# Patient Record
Sex: FEMALE | Employment: FULL TIME | URBAN - METROPOLITAN AREA
[De-identification: names, ages, dates, MRNs, and addresses within clinical notes are randomized per-mention and may not be internally consistent; named-entity substitution may affect disease eponyms.]

---

## 2019-05-28 ENCOUNTER — OFFICE VISIT (OUTPATIENT)
Dept: ORTHOPEDIC SURGERY | Age: 39
End: 2019-05-28

## 2019-05-28 VITALS
SYSTOLIC BLOOD PRESSURE: 137 MMHG | WEIGHT: 273.2 LBS | OXYGEN SATURATION: 98 % | HEART RATE: 77 BPM | BODY MASS INDEX: 41.4 KG/M2 | TEMPERATURE: 98.1 F | DIASTOLIC BLOOD PRESSURE: 86 MMHG | HEIGHT: 68 IN

## 2019-05-28 DIAGNOSIS — M54.16 LUMBAR RADICULOPATHY: ICD-10-CM

## 2019-05-28 DIAGNOSIS — E66.01 OBESITY, MORBID (HCC): ICD-10-CM

## 2019-05-28 DIAGNOSIS — M51.26 HNP (HERNIATED NUCLEUS PULPOSUS), LUMBAR: Primary | ICD-10-CM

## 2019-05-28 RX ORDER — CELECOXIB 100 MG/1
CAPSULE ORAL 2 TIMES DAILY
COMMUNITY
End: 2019-12-17

## 2019-05-28 RX ORDER — DIAZEPAM 5 MG/1
5 TABLET ORAL
COMMUNITY

## 2019-05-28 RX ORDER — PREDNISONE 5 MG/1
TABLET ORAL
Qty: 21 TAB | Refills: 0 | Status: ON HOLD | OUTPATIENT
Start: 2019-05-28 | End: 2019-06-11

## 2019-05-28 RX ORDER — GABAPENTIN 300 MG/1
300 CAPSULE ORAL 3 TIMES DAILY
Qty: 90 CAP | Refills: 1 | Status: SHIPPED | OUTPATIENT
Start: 2019-05-28 | End: 2019-12-17

## 2019-05-28 NOTE — H&P (VIEW-ONLY)
[address] 
[address], [address] Phone: [phone] Fax: [phone] PROGRESS NOTE Patient: Radha Coe                MRN: [id number]       SSN: [id number] YOB: 1980        AGE: 45 y.o. SEX: female Body mass index is 41.54 kg/m². PCP: GILBERTO Silva 
05/28/19 No chief complaint on file. HISTORY OF PRESENT ILLNESS, RADIOGRAPHS, and PLAN:  
 
HISTORY OF PRESENT ILLNESS:  Ms. Junior Zhu is seen today. She is a 60-year-old female who about four years ago I performed an L4-5 laminotomy discectomy on the left. She has done relatively well. She said about four weeks ago, she began to have this relatively severe cramping pain in her leg on the left. Initially, this was just from the calf on down, and then it was a more ____________sciatica for about the past two weeks. She initially was very numb and weak with what she describes as a foot drop on the left. That has since improved. She denies bowel or bladder dysfunction, fever, chills, or night sweats, weight loss or weight gain. PHYSICAL EXAMINATION:  Physical exam demonstrates a positive straight leg raise on the left and weakness of her left EHL and tib/ant that I would rate as a 3+/5. There is numbness to the lateral aspect of the leg. RADIOGRAPHS:  The patients MRI demonstrates a recurrent disc herniation at L4-5 on the left, post-laminectomy changes, and advanced degenerative change at L2-3 and L3-4. ASSESSMENT/PLAN: We have a patient with a recurrent disc herniation at L4-5. She has had initially dramatic pain and weakness that is improved, by her report, since the initial injury two to four weeks ago. At this point, we are going to try some aggressive conservative care with selective blocks and an oral steroid dose pack, use of Gabapentin, and physical therapy.   We will see her back and see if we can continue her mode to improvement. Given the size of the recurrence, it would not surprise me if she required revision surgery, which would either be a revision decompression or decompression and fusion of this L4-5 segment. I would try to avoid fusion surgery in as much as her adjacent segments are so diseased. We discussed the risks, benefits, complications, and alternatives to various treatment options. At this point, she wishes for aggressive conservative care. We will begin that process. 4V Lumbar XR Next Visit Past Medical History:  
Diagnosis Date  Cancer ([address] skin at age 8, Thyroid 2005, Endometrial 2012  Chronic back pain  Sciatica of left side  Thyroid disease   
 hypothyroid Family History Problem Relation Age of Onset  No Known Problems Mother  No Known Problems Father Current Outpatient Medications Medication Sig Dispense Refill  celecoxib (CELEBREX) 100 mg capsule Take  by mouth two (2) times a day.  diazePAM (VALIUM) 5 mg tablet Take 5 mg by mouth every six (6) hours as needed for Anxiety.  lidocaine (LIDODERM) 5 %  multivitamin with iron tablet Take 1 Tab by mouth daily.  IBUPROFEN (ADVIL PO) Take  by mouth daily as needed.  HYDROcodone-acetaminophen (NORCO) 5-325 mg per tablet Take 1 Tab by mouth four (4) times daily as needed for Pain. Max Daily Amount: 4 Tabs. 40 Tab 0  cyclobenzaprine (FLEXERIL) 10 mg tablet Take 1 Tab by mouth three (3) times daily as needed for Muscle Spasm(s). Indications: MUSCLE SPASM 40 Tab 0  
 levothyroxine (SYNTHROID) 125 mcg tablet Take 175 mcg by mouth Daily (before breakfast).  cyclobenzaprine (FLEXERIL) 5 mg tablet  pregabalin (LYRICA) 75 mg capsule Take 1 Cap by mouth two (2) times a day. Max Daily Amount: 150 mg. 60 Cap 2 Allergies Allergen Reactions  Morphine Nausea and Vomiting  Prednisone Unknown (comments) Leg cramps Steroid injections ok  Sulfa (Sulfonamide Antibiotics) Hives Past Surgical History:  
Procedure Laterality Date  HX DILATION AND CURETTAGE  [id number]  
 x2  HX LUMBAR LAMINECTOMY  9/17/15 Zoya Erazo  HX PARTIAL THYROIDECTOMY  2005 Past Medical History:  
Diagnosis Date  Cancer ([facility] [address] skin at age 8, Thyroid 2005, Endometrial 2012  Chronic back pain  Sciatica of left side  Thyroid disease   
 hypothyroid Social History Socioeconomic History  Marital status: UNKNOWN Spouse name: Not on file  Number of children: Not on file  Years of education: Not on file  Highest education level: Not on file Occupational History  Not on file Social Needs  Financial resource strain: Not on file  Food insecurity:  
  Worry: Not on file Inability: Not on file  Transportation needs:  
  Medical: Not on file Non-medical: Not on file Tobacco Use  Smoking status: Former Smoker Last attempt to quit: 8/1/2015 Years since quitting: 3.8  Smokeless tobacco: Never Used Substance and Sexual Activity  Alcohol use: Yes Comment: socially  Drug use: No  
 Sexual activity: Not on file Lifestyle  Physical activity:  
  Days per week: Not on file Minutes per session: Not on file  Stress: Not on file Relationships  Social connections:  
  Talks on phone: Not on file Gets together: Not on file Attends Rastafari service: Not on file Active member of club or organization: Not on file Attends meetings of clubs or organizations: Not on file Relationship status: Not on file  Intimate partner violence:  
  Fear of current or ex partner: Not on file Emotionally abused: Not on file Physically abused: Not on file Forced sexual activity: Not on file Other Topics Concern  Not on file Social History Narrative  Not on file REVIEW OF SYSTEMS: 
 CONSTITUTIONAL SYMPTOMS:  Negative. EYES:  Negative. EARS, NOSE, THROAT AND MOUTH:  Negative. CARDIOVASCULAR:  Negative. RESPIRATORY:  Negative. GENITOURINARY: Per HPI. GASTROINTESTINAL:  Per HPI. INTEGUMENTARY (SKIN AND/OR BREAST):  Negative. MUSCULOSKELETAL: Per HPI. ENDOCRINE/RHEUMATOLOGIC:  Negative. NEUROLOGICAL:  Per HPI. HEMATOLOGIC/LYMPHATIC:  Negative. ALLERGIC/IMMUNOLOGIC:  Negative. PSYCHIATRIC:  Negative. PHYSICAL EXAMINATION:  
Visit Vitals /86 (BP 1 Location: Left arm, BP Patient Position: Sitting) Pulse 77 Temp 98.1 °F (36.7 °C) (Oral) Ht 5' 8\" (1.727 m) Wt 273 lb 3.2 oz (123.9 kg) SpO2 98% BMI 41.54 kg/m² PAIN SCALE: 4/10 CONSTITUTIONAL: The patient is in no apparent distress and is alert and oriented x 3. HEENT: Normocephalic. Hearing grossly intact. NECK: Supple and symmetric. no tenderness, or masses were felt. RESPIRATORY: No labored breathing. CARDIOVASCULAR: The carotid pulses were normal. Peripheral pulses were 2+. CHEST: Normal AP diameter and normal contour without any kyphoscoliosis. LYMPHATIC: No lymphadenopathy was appreciated in the neck, axillae or groin. SKIN:   Negative for scars, rashes, lesions, or ulcers on the right upper, right lower, left upper, left lower and trunk. NEUROLOGICAL: Alert and oriented x 3. Ambulation without assistive device. FWB. EXTREMITIES: See musculoskeletal. 
MUSCULOSKELETAL: 
? Head and Neck:  Negative for misalignment, asymmetry, crepitation, defects, tenderness masses or effusions. ? Left Upper Extremity: Inspection, percussion and palpation performed. Lymans sign is negative. ? Right Upper Extremity: Inspection, percussion and palpation performed. Lymans sign is negative. ? Spine, Ribs and Pelvis: Inspection, percussion and palpation performed. Negative for misalignment, asymmetry, crepitation, defects, tenderness masses or effusions. ? Left Lower Extremity: Sciatica, numbness, cramps, weakness.  Inspection, percussion and palpation performed. Positive straight leg raise. ? Right Lower Extremity: Inspection, percussion and palpation performed. Negative straight leg raise. SPINE EXAM:  
 
Cervical spine: Neck is midline. Normal muscle tone. No focal atrophy is noted. Lumbar spine: No rash, ecchymosis, or gross obliquity. No focal atrophy is noted. ASSESSMENT 
  ICD-10-CM ICD-9-CM 1. HNP (herniated nucleus pulposus), lumbar M51.26 722.10 REFERRAL TO PHYSICAL THERAPY  
   gabapentin (NEURONTIN) 300 mg capsule SCHEDULE SURGERY  
   predniSONE (STERAPRED) 5 mg dose pack 2. Obesity, morbid ([facility] [address] E66.01 278.01   
3. Lumbar radiculopathy M54.16 724.4 REFERRAL TO PHYSICAL THERAPY  
   gabapentin (NEURONTIN) 300 mg capsule SCHEDULE SURGERY  
   predniSONE (STERAPRED) 5 mg dose pack Written by Ledy Enriquez, as dictated by Jacy Fernandez MD. 
 
I, Dr. Jacy Fernandez MD, confirm that all documentation is accurate.

## 2019-05-28 NOTE — PROGRESS NOTES
[address]  [address]  [address]  Phone: [phone]  Fax: [phone]  PROGRESS NOTE  Patient: Cuauhtemoc Wagner                MRN: [id number]       SSN: [id number]  YOB: 1980        AGE: 45 y.o. SEX: female  Body mass index is 41.54 kg/m². PCP: Analisa Topete PA  05/28/19    No chief complaint on file. HISTORY OF PRESENT ILLNESS, RADIOGRAPHS, and PLAN:     HISTORY OF PRESENT ILLNESS:  Ms. Augusto Contreras is seen today. She is a 59-year-old female who about four years ago I performed an L4-5 laminotomy discectomy on the left. She has done relatively well. She said about four weeks ago, she began to have this relatively severe cramping pain in her leg on the left. Initially, this was just from the calf on down, and then it was a more ____________sciatica for about the past two weeks. She initially was very numb and weak with what she describes as a foot drop on the left. That has since improved. She denies bowel or bladder dysfunction, fever, chills, or night sweats, weight loss or weight gain. PHYSICAL EXAMINATION:  Physical exam demonstrates a positive straight leg raise on the left and weakness of her left EHL and tib/ant that I would rate as a 3+/5. There is numbness to the lateral aspect of the leg. RADIOGRAPHS:  The patients MRI demonstrates a recurrent disc herniation at L4-5 on the left, post-laminectomy changes, and advanced degenerative change at L2-3 and L3-4. ASSESSMENT/PLAN: We have a patient with a recurrent disc herniation at L4-5. She has had initially dramatic pain and weakness that is improved, by her report, since the initial injury two to four weeks ago. At this point, we are going to try some aggressive conservative care with selective blocks and an oral steroid dose pack, use of Gabapentin, and physical therapy.   We will see her back and see if we can continue her mode to improvement. Given the size of the recurrence, it would not surprise me if she required revision surgery, which would either be a revision decompression or decompression and fusion of this L4-5 segment. I would try to avoid fusion surgery in as much as her adjacent segments are so diseased. We discussed the risks, benefits, complications, and alternatives to various treatment options. At this point, she wishes for aggressive conservative care. We will begin that process. 4V Lumbar XR Next Visit    Past Medical History:   Diagnosis Date    Cancer [facility])     skin at age 8, Thyroid 2005, Endometrial 2012    Chronic back pain     Sciatica of left side     Thyroid disease     hypothyroid       Family History   Problem Relation Age of Onset    No Known Problems Mother     No Known Problems Father        Current Outpatient Medications   Medication Sig Dispense Refill    celecoxib (CELEBREX) 100 mg capsule Take  by mouth two (2) times a day.  diazePAM (VALIUM) 5 mg tablet Take 5 mg by mouth every six (6) hours as needed for Anxiety.  lidocaine (LIDODERM) 5 %       multivitamin with iron tablet Take 1 Tab by mouth daily.  IBUPROFEN (ADVIL PO) Take  by mouth daily as needed.  HYDROcodone-acetaminophen (NORCO) 5-325 mg per tablet Take 1 Tab by mouth four (4) times daily as needed for Pain. Max Daily Amount: 4 Tabs. 40 Tab 0    cyclobenzaprine (FLEXERIL) 10 mg tablet Take 1 Tab by mouth three (3) times daily as needed for Muscle Spasm(s). Indications: MUSCLE SPASM 40 Tab 0    levothyroxine (SYNTHROID) 125 mcg tablet Take 175 mcg by mouth Daily (before breakfast).  cyclobenzaprine (FLEXERIL) 5 mg tablet       pregabalin (LYRICA) 75 mg capsule Take 1 Cap by mouth two (2) times a day.  Max Daily Amount: 150 mg. 60 Cap 2       Allergies   Allergen Reactions    Morphine Nausea and Vomiting    Prednisone Unknown (comments)     Leg cramps  Steroid injections ok    Sulfa (Sulfonamide Antibiotics) Hives       Past Surgical History:   Procedure Laterality Date    HX DILATION AND CURETTAGE  3517,0505    x2    HX LUMBAR LAMINECTOMY  9/17/15    Shakira Carrasco HX PARTIAL THYROIDECTOMY  2005       Past Medical History:   Diagnosis Date    Cancer [facility])     skin at age 8, Thyroid 2005, Endometrial 2012    Chronic back pain     Sciatica of left side     Thyroid disease     hypothyroid       Social History     Socioeconomic History    Marital status: UNKNOWN     Spouse name: Not on file    Number of children: Not on file    Years of education: Not on file    Highest education level: Not on file   Occupational History    Not on file   Social Needs    Financial resource strain: Not on file    Food insecurity:     Worry: Not on file     Inability: Not on file    Transportation needs:     Medical: Not on file     Non-medical: Not on file   Tobacco Use    Smoking status: Former Smoker     Last attempt to quit: 8/1/2015     Years since quitting: 3.8    Smokeless tobacco: Never Used   Substance and Sexual Activity    Alcohol use: Yes     Comment: socially    Drug use: No    Sexual activity: Not on file   Lifestyle    Physical activity:     Days per week: Not on file     Minutes per session: Not on file    Stress: Not on file   Relationships    Social connections:     Talks on phone: Not on file     Gets together: Not on file     Attends [employer] service: Not on file     Active member of club or organization: Not on file     Attends meetings of clubs or organizations: Not on file     Relationship status: Not on file    Intimate partner violence:     Fear of current or ex partner: Not on file     Emotionally abused: Not on file     Physically abused: Not on file     Forced sexual activity: Not on file   Other Topics Concern    Not on file   Social History Narrative    Not on file         REVIEW OF SYSTEMS:   CONSTITUTIONAL SYMPTOMS:  Negative. EYES:  Negative.    EARS, NOSE, THROAT AND MOUTH:  Negative. CARDIOVASCULAR:  Negative. RESPIRATORY:  Negative. GENITOURINARY: Per HPI. GASTROINTESTINAL:  Per HPI. INTEGUMENTARY (SKIN AND/OR BREAST):  Negative. MUSCULOSKELETAL: Per HPI.   ENDOCRINE/RHEUMATOLOGIC:  Negative. NEUROLOGICAL:  Per HPI. HEMATOLOGIC/LYMPHATIC:  Negative. ALLERGIC/IMMUNOLOGIC:  Negative. PSYCHIATRIC:  Negative. PHYSICAL EXAMINATION:   Visit Vitals  /86 (BP 1 Location: Left arm, BP Patient Position: Sitting)   Pulse 77   Temp 98.1 °F (36.7 °C) (Oral)   Ht 5' 8\" (1.727 m)   Wt 273 lb 3.2 oz (123.9 kg)   SpO2 98%   BMI 41.54 kg/m²    PAIN SCALE: 4/10    CONSTITUTIONAL: The patient is in no apparent distress and is alert and oriented x 3. HEENT: Normocephalic. Hearing grossly intact. NECK: Supple and symmetric. no tenderness, or masses were felt. RESPIRATORY: No labored breathing. CARDIOVASCULAR: The carotid pulses were normal. Peripheral pulses were 2+. CHEST: Normal AP diameter and normal contour without any kyphoscoliosis. LYMPHATIC: No lymphadenopathy was appreciated in the neck, axillae or groin. SKIN:   Negative for scars, rashes, lesions, or ulcers on the right upper, right lower, left upper, left lower and trunk. NEUROLOGICAL: Alert and oriented x 3. Ambulation without assistive device. FWB. EXTREMITIES: See musculoskeletal.  MUSCULOSKELETAL:   Head and Neck:  Negative for misalignment, asymmetry, crepitation, defects, tenderness masses or effusions.  Left Upper Extremity: Inspection, percussion and palpation performed. Lymans sign is negative.  Right Upper Extremity: Inspection, percussion and palpation performed. Lymans sign is negative.  Spine, Ribs and Pelvis: Inspection, percussion and palpation performed. Negative for misalignment, asymmetry, crepitation, defects, tenderness masses or effusions.  Left Lower Extremity: Sciatica, numbness, cramps, weakness. Inspection, percussion and palpation performed. Positive straight leg raise.  Right Lower Extremity: Inspection, percussion and palpation performed. Negative straight leg raise. SPINE EXAM:     Cervical spine: Neck is midline. Normal muscle tone. No focal atrophy is noted. Lumbar spine: No rash, ecchymosis, or gross obliquity. No focal atrophy is noted. ASSESSMENT    ICD-10-CM ICD-9-CM    1. HNP (herniated nucleus pulposus), lumbar M51.26 722.10 REFERRAL TO PHYSICAL THERAPY      gabapentin (NEURONTIN) 300 mg capsule      SCHEDULE SURGERY      predniSONE (STERAPRED) 5 mg dose pack   2. Obesity, morbid ([facility] [address] E66.01 278.01    3. Lumbar radiculopathy M54.16 724.4 REFERRAL TO PHYSICAL THERAPY      gabapentin (NEURONTIN) 300 mg capsule      SCHEDULE SURGERY      predniSONE (STERAPRED) 5 mg dose pack       Written by Melinda Jacobs, as dictated by Luzma Church MD.    I, Dr. Luzma Church MD, confirm that all documentation is accurate.

## 2019-05-31 ENCOUNTER — TELEPHONE (OUTPATIENT)
Dept: ORTHOPEDIC SURGERY | Age: 39
End: 2019-05-31

## 2019-05-31 NOTE — TELEPHONE ENCOUNTER
He also prescribed gabapentin and PT. Can move forward with those 2 treatment options and not take Steroid.

## 2019-05-31 NOTE — TELEPHONE ENCOUNTER
Called in to state that she did not realize that you had given her a prednisone anthony but she had a bad reaction to it several years ago. She had muscle cramping and muscle tearing. She does not want to take Prednisone again. She wanted to know if you wanted to give her something in its place?

## 2019-06-07 ENCOUNTER — HOSPITAL ENCOUNTER (OUTPATIENT)
Dept: PHYSICAL THERAPY | Age: 39
Discharge: HOME | End: 2019-06-07
Payer: OTHER GOVERNMENT

## 2019-06-07 PROCEDURE — 97530 THERAPEUTIC ACTIVITIES: CPT | Performed by: PHYSICAL THERAPIST

## 2019-06-07 PROCEDURE — 97162 PT EVAL MOD COMPLEX 30 MIN: CPT | Performed by: PHYSICAL THERAPIST

## 2019-06-07 NOTE — PROGRESS NOTES
PHYSICAL THERAPY - DAILY TREATMENT NOTE     Patient Name: Jose Hand       Date: 3/1/2019  : 3/14/1968   YES Patient  Verified  Visit #:      [id number]  Insurance: Payor: Keturah Colunga / Plan: Boni Fairchild / Product Type: HMO /      In time: 8419 Out time: 100   Total Treatment Time: 50     Medicare/[facility] Time Tracking (below)   Total Timed Codes (min):   1:1 Treatment Time:       TREATMENT AREA =  Lumbar spine, L LE    SUBJECTIVE    Pain Level (on 0 to 10 scale):  7  / 10   Medication Changes/New allergies or changes in medical history, any new surgeries or procedures? NO    If yes, update Summary List   Subjective Functional Status/Changes:  []  No changes reported     See IE         OBJECTIVE  15 min Therapeutic Activity:  [x]  See flow sheet   Rationale:      increase ROM and increase strength to improve the patients ability to  improve patient's ability to perform ADL's with decreased pain         Billed With/As:   [] TE   [x] TA   [] Neuro   [] Self Care Patient Education: [] Review HEP    [] Progressed/Changed HEP based on:   [x] positioning   [x] body mechanics   [] transfers   [x] heat/ice application    [x] other: Educated the pt about HNP, posture, initiated prone lying and ext, use of ice to reduce inflammation, strategies to feed dogs without bending. Other Objective/Functional Measures:    eval completed     Post Treatment Pain Level (on 0 to 10) scale:    10     ASSESSMENT    Assessment/Changes in Function:     See PN     []  See Progress Note/Recertification   Patient will continue to benefit from skilled PT services to modify and progress therapeutic interventions, address functional mobility deficits, address ROM deficits, address strength deficits, analyze and address soft tissue restrictions, analyze and cue movement patterns, analyze and modify body mechanics/ergonomics, assess and modify postural abnormalities and address imbalance/dizziness to attain remaining goals. Progress toward goals / Updated goals:    See PN     PLAN    [x]  Upgrade activities as tolerated YES Continue plan of care   []  Discharge due to :    []  Other:      Therapist: Derrell Nelson PT    Date: 6/7/19 Time: 2:00PM

## 2019-06-07 NOTE — PROGRESS NOTES
[address]   PHYSICAL THERAPY AT [facility] [address] [place], [address] - Phone: [phone]  Fax: [phone] / [facility]  Patient Name: Carrie Garcia : 1980   Medical   Diagnosis: Lower back pain [M54.5] Treatment Diagnosis: Lower back pain [M54.5] HNP lumbar (M51.26) Lumbar radic (M54.16)   Onset Date:      Referral Source: Reilly Franks MD [facility]): 2019   Prior Hospitalization: See medical history Provider #: [id number]   Prior Level of Function: Pain free ADLs   Comorbidities: L4 Lumbar laminectomy 2015, obestity   Medications: Verified on Patient Summary List   The Plan of Care and following information is based on the information from the initial evaluation.   ===========================================================================================  Assessment / key information:   Pt is a 45y.o. year old female who presents with co LS pain with L LE radiculopathy and tightness/muscle spasms and numbness of posterior chain starting with insidious onset late April and worsened when she slipped in shower 19 and was resolving until she bent forward 19 at which time she began having debilitating radiating pain to her L great toe and now c/o being unable to feel touch other than pressure from lateral gastroc though her foot. She has visited the ER multiple times since this occurrence started, generally at [facility], except after her slip in shower she called an ambulance that took her to closest ED at [facility] where she reports having an MRI. MRI revealed L4-5 disc extrusion and L foraminal narrowing. Patient was being treated with PT from 12/1/15- 16 and  16 - 16 with IMT/ dry needling, core strengthening and mechanical traction. She also has had series of cortisone injections in the past which were relieving.  She is having one on 6/11/19. Patient saw chiropractor 6/4/19 and felt some relief following. Current deficits include: increased pain to 10/10 at worst, increased pain with LS flexion and is unable to drive due to pain, medication. Poor core strength indicated by  50% decreased bridge with pain, L hip flexion strength 4-/5, quad, ant tib = L4/5, ext owen = 2+/5, glut med= 4/5, HS = 4+/5, glut max = 4-/5. L posterior chain tightness with positive seated slump and 90/90 HS flexibility, postural dysfunction with L trunk lean and L should depression. Functional deficits include: am pain/ stiffness, general household activities, lifting, bending, work duties - sitting 30 hours . Pt is currently unable to drive or work due to limited sitting tolerance. FOTO =10 out of possible 100 indicating significant reduction in QOL. Home exercise program of prone press-ups, use of ice, sitting posture and body mechanics ed initiated on initial evaluation to address these deficits. Pt would benefit from PT to address these deficits for increased functional mobility and QOL.    ===========================================================================================  Eval Complexity: History HIGH Complexity :3+ comorbidities / personal factors will impact the outcome/ POC ;  Examination  MEDIUM Complexity : 3 Standardized tests and measures addressing body structure, function, activity limitation and / or participation in recreation ; Presentation MEDIUM Complexity : Evolving with changing characteristics ;   Decision Making HIGH Complexity : FOTO score of 1- 25 ; Overall Complexity MEDIUM  Problem List: pain affecting function, decrease ROM, decrease strength, edema affecting function, impaired gait/ balance, decrease ADL/ functional abilitiies, decrease activity tolerance and decrease flexibility/ joint mobility   Treatment Plan may include any combination of the following: Therapeutic exercise, Therapeutic activities, Neuromuscular re-education, Physical agent/modality, Manual therapy, Patient education, Self Care training and Functional mobility training   *Dry needling  Patient / Family readiness to learn indicated by: asking questions, trying to perform skills and interest  Persons(s) to be included in education: patient (P)  Barriers to Learning/Limitations: None  Measures taken, if barriers to learning:    Patient Goal (s): Relieve pressure from herniated disc. Patient self reported health status: good  Rehabilitation Potential: fair  · Short Term Goals: To be accomplished in  1  weeks:  1. Pt will be independent and compliant with HEP  · Long Term Goals: To be accomplished in 16  treatments:  1. Patient will increase FOTO score to 54/100 for indications of increased functional mobility. 2.  Patient will demo negative seated slump with 5 deg DF AROM for improved neural gliding to decrease pain with LS flexion activities   3. Patient will demo 100% bridge with pain less than 3/10 for indication of improved core strength for decreased compression with sitting at work   4. Patient will demo increased L ext owen strength to  4-/5 for decreased LS strength with car transfers   Frequency / Duration:   Patient to be seen  2-3  times per week for 16  treatments:  Patient / Caregiver education and instruction: self care, activity modification and exercises    Therapist Signature: Alonso Truong PT Date: 8/0/4783   Certification Period:  Time: 1:18 PM   ===========================================================================================  I certify that the above Physical Therapy Services are being furnished while the patient is under my care. I agree with the treatment plan and certify that this therapy is necessary. Physician Signature:        Date:       Time:     Please sign and return to [facility] or you may fax the signed copy to [phone]. Thank you.

## 2019-06-11 ENCOUNTER — HOSPITAL ENCOUNTER (OUTPATIENT)
Age: 39
Setting detail: OUTPATIENT SURGERY
Discharge: HOME | End: 2019-06-11
Attending: PHYSICAL MEDICINE & REHABILITATION | Admitting: PHYSICAL MEDICINE & REHABILITATION
Payer: OTHER GOVERNMENT

## 2019-06-11 ENCOUNTER — APPOINTMENT (OUTPATIENT)
Dept: GENERAL RADIOLOGY | Age: 39
End: 2019-06-11
Attending: PHYSICAL MEDICINE & REHABILITATION
Payer: OTHER GOVERNMENT

## 2019-06-11 VITALS
SYSTOLIC BLOOD PRESSURE: 137 MMHG | OXYGEN SATURATION: 100 % | WEIGHT: 273 LBS | HEART RATE: 75 BPM | RESPIRATION RATE: 14 BRPM | DIASTOLIC BLOOD PRESSURE: 87 MMHG | TEMPERATURE: 98.5 F | HEIGHT: 68 IN | BODY MASS INDEX: 41.37 KG/M2

## 2019-06-11 PROCEDURE — 74011250636 HC RX REV CODE- 250/636: Performed by: PHYSICAL MEDICINE & REHABILITATION

## 2019-06-11 PROCEDURE — 74011636320 HC RX REV CODE- 636/320: Performed by: PHYSICAL MEDICINE & REHABILITATION

## 2019-06-11 PROCEDURE — 77030003672 HC NDL SPN HALY -A: Performed by: PHYSICAL MEDICINE & REHABILITATION

## 2019-06-11 PROCEDURE — 76010000009 HC PAIN MGT 0 TO 30 MIN PROC: Performed by: PHYSICAL MEDICINE & REHABILITATION

## 2019-06-11 PROCEDURE — 77030039433 HC TY MYLEOGRAM BD -B: Performed by: PHYSICAL MEDICINE & REHABILITATION

## 2019-06-11 PROCEDURE — 77030003669 HC NDL SPN COOK -B: Performed by: PHYSICAL MEDICINE & REHABILITATION

## 2019-06-11 PROCEDURE — 74011250637 HC RX REV CODE- 250/637: Performed by: PHYSICAL MEDICINE & REHABILITATION

## 2019-06-11 RX ORDER — DIAZEPAM 5 MG/1
2.5-1 TABLET ORAL ONCE
Status: COMPLETED | OUTPATIENT
Start: 2019-06-11 | End: 2019-06-11

## 2019-06-11 RX ORDER — DEXAMETHASONE SODIUM PHOSPHATE 100 MG/10ML
INJECTION INTRAMUSCULAR; INTRAVENOUS AS NEEDED
Status: DISCONTINUED | OUTPATIENT
Start: 2019-06-11 | End: 2019-06-11 | Stop reason: HOSPADM

## 2019-06-11 RX ORDER — LIDOCAINE HYDROCHLORIDE 10 MG/ML
INJECTION, SOLUTION EPIDURAL; INFILTRATION; INTRACAUDAL; PERINEURAL AS NEEDED
Status: DISCONTINUED | OUTPATIENT
Start: 2019-06-11 | End: 2019-06-11 | Stop reason: HOSPADM

## 2019-06-11 RX ADMIN — DIAZEPAM 5 MG: 5 TABLET ORAL at 10:00

## 2019-06-11 NOTE — DISCHARGE INSTRUCTIONS
[facility]   (BERTO)  Dr. Niki Davison, Dr. Sharad Garcia, Dr. Marilynn Aguilera not drive a car, operate heavy machinery or dangerous equipment for 24 hours. * Activity as tolerated; rest for the remainder of the day. * Resume pre-block medications including those for your family doctor. * Do not drink alcoholic beverages for 24 hours. Alcohol and the medications you have received may interact and cause an adverse reaction. * You may feel better this evening and worse tomorrow, as the numbing medications wears off and the steroid has yet to begin to work. After 48 hrs the steroid should begin to release bringing you relief. * You may shower this evening and remove any bandages. * Avoid hot tubs and heating pads for 24 hours. You may use cold packs on the procedure site as tolerated for the first 24 hours. * If a headache develops, drink plenty of fluids and rest.  Take over the counter medications for headache if needed. If the headache continues longer than 24 hours, call MD at the [address] [place] [address]. [phone]    * Continue taking pain medications as needed. * You may resume your regular diet if tolerated. Otherwise, start with sips of water and advance slowly. * If Diabetic: check your blood sugar three times a day for the next 3 days. If your sugar is greater than 300 call your family doctor. If your sugar is greater than 400, have someone transport you to the nearest Emergency Room. * If you experience any of the following problems, Please Call the [address] [place] [address] at [phone].         * Shortness of Breath    * Fever of 101 or higher    * Nausea / Vomiting    * Severe Headache    * Weakness or numbness in arms or legs that is not      resolving    * Prolonged increase in pain greater than 4 days      DISCHARGE SUMMARY from Nurse      PATIENT INSTRUCTIONS:    After oral sedation, for 24 hours or while taking prescription Narcotics:  · Limit your activities  · Do not drive and operate hazardous machinery  · Do not make important personal or business decisions  · Do  not drink alcoholic beverages  · If you have not urinated within 8 hours after discharge, please contact your surgeon on call. Report the following to your surgeon:  · Excessive pain, swelling, redness or odor of or around the surgical area  · Temperature over 101  · Nausea and vomiting lasting longer than 4 hours or if unable to take medications  · Any signs of decreased circulation or nerve impairment to extremity: change in color, persistent  numbness, tingling, coldness or increase pain  · Any questions            What to do at Home:  Recommended activity: Activity as tolerated, NO DRIVING FOR 12 Hours post injection          *  Please give a list of your current medications to your Primary Care Provider. *  Please update this list whenever your medications are discontinued, doses are      changed, or new medications (including over-the-counter products) are added. *  Please carry medication information at all times in case of emergency situations. These are general instructions for a healthy lifestyle:    No smoking/ No tobacco products/ Avoid exposure to second hand smoke    Surgeon General's Warning:  Quitting smoking now greatly reduces serious risk to your health. Obesity, smoking, and sedentary lifestyle greatly increases your risk for illness    A healthy diet, regular physical exercise & weight monitoring are important for maintaining a healthy lifestyle    You may be retaining fluid if you have a history of heart failure or if you experience any of the following symptoms:  Weight gain of 3 pounds or more overnight or 5 pounds in a week, increased swelling in our hands or feet or shortness of breath while lying flat in bed.   Please call your doctor as soon as you notice any of these symptoms; do not wait until your next office visit. Recognize signs and symptoms of STROKE:    F-face looks uneven    A-arms unable to move or move unevenly    S-speech slurred or non-existent    T-time-call 911 as soon as signs and symptoms begin-DO NOT go       Back to bed or wait to see if you get better-TIME IS BRAIN.

## 2019-06-11 NOTE — PROCEDURES
PROCEDURE NOTE      Patient Name: Papito Lopez  Date of Procedure: June 11, 2019  Preoperative Diagnosis:  Lumbar radicular pain  Post Operative Diagnosis:   [facility]  [facility] [address] [place] [address]    Procedure :    left L4 Selective Nerve Root Block  left L5 Selective Nerve Root Block    Consent:  Informed consent was obtained prior to the procedure. The patient was given the opportunity to ask questions regarding the procedure and its associated risks. In addition to the potential risks associated with the procedure itself, the patient was informed both verbally and in writing of the potential side effects of the use of glucocorticoid. The patient appeared to comprehend the informed consent and desired to have the procedure performed. Procedure: The patient was placed in the prone position on the fluoroscopy table and the back was prepped and draped in the usual sterile manner. The exact spinal level was  identified using fluoroscopy, and Lidocaine 1 % was injected locally, a #5, 22 gauge spinal needle was passed to the transverse process. The depth was noted and the needle redirected to pass inferior and approximately one cm anterior to the transverse process. YES  1 cc of Isovue M-200 was used to verify positioning in the epidural and paravertebral space and outlined the course of the spinal nerve into the epidural space. The same procedure was repeated at each spinal level indicated above. No vascular uptake was identified. A total of 30 mg of preservative free Dexamethasone and 2 cc of Lidocaine was slowly injected. The patient tolerated the procedure well. The injection area was cleaned and bandaids applied. Not excessive bleeding was noted. Patient dressed and discharged to home with instructions. Discussion: The patient tolerated the procedure well.                                               Randy Crane MD  June 11, 2019

## 2019-06-11 NOTE — INTERVAL H&P NOTE
H&P Update: 
Desmond Villalobos was seen and briefly examined. History and physical has been reviewed.   There have been no significant clinical changes since the completion of the originally dated History and Physical.

## 2019-06-21 ENCOUNTER — HOSPITAL ENCOUNTER (OUTPATIENT)
Dept: PHYSICAL THERAPY | Age: 39
Discharge: HOME | End: 2019-06-21
Payer: OTHER GOVERNMENT

## 2019-06-21 PROCEDURE — 97110 THERAPEUTIC EXERCISES: CPT

## 2019-06-21 PROCEDURE — 97012 MECHANICAL TRACTION THERAPY: CPT

## 2019-06-21 PROCEDURE — 97140 MANUAL THERAPY 1/> REGIONS: CPT

## 2019-06-21 NOTE — PROGRESS NOTES
[facility] PT DAILY TREATMENT NOTE 8-    Patient Name: Nicole Amanda  Date:2019  : 1980  [x]  Patient  Verified  Payor:  / Plan: [facility]  [facility] / Product Type:  /    In time:800  Out time:850  Total Treatment Time (min): 50  Visit #: 2 of -16    Treatment Area: Lower back pain [M54.5]    SUBJECTIVE  Pain Level (0-10 scale): 3-4   Any medication changes, allergies to medications, adverse drug reactions, diagnosis change, or new procedure performed?: [x] No    [] Yes (see summary sheet for update)  Subjective functional status/changes:   [] No changes reported  \"I re injured it multiple times. \"    OBJECTIVE  Modality rationale: increase tissue extensibility to improve the patients ability to promote spinal decompression and dec radicualr pain    Min Type Additional Details    [] Estim: []Att   []Unatt        []TENS instruct                  []IFC  []Premod   []NMES                     []Other:  []w/US   []w/ice   []w/heat  Position:  Location:   15 [x]  Traction: LS in supine - 3 john pintermittent  Lbs:100/50      []  Ultrasound: []Continuous   [] Pulsed                           []1MHz   []3MHz Location:  W/cm2:    []  Iontophoresis with dexamethasone         Location: [] Take home patch   [] In clinic    []  Ice     []  heat  []  Ice massage Position:  Location:    []  Vasopneumatic Device Pressure:       [] lo [] med [] hi   Temperature: [] lo [] med [] hi   [x] Skin assessment post-treatment:  [x]intact []redness- no adverse reaction       []redness - adverse reaction:       20 min Therapeutic Exercise:  [x] See flow sheet :Initiated ther ex per flow sheet, symptom review after transfer and prolonged absence     Rationale: increase ROM and decrease radicualr sx  to improve the patients ability to progress activity tolerance with decreased pain levles     15 min Manual Therapy: Technique:      IMT NC   Post needling ischemic compression   L HS rolling    Rationale: decrease pain, increase ROM, increase tissue extensibility and decrease trigger points to improve patient's ability to decrease pain with activities such as walking, standing and sitting     Dry Needling Procedure Note    Dry Needle Session Number:  1    Procedure: An intramuscular manual therapy (dry needling) and a neuro-muscular re-education treatment was done to deactivate myofascial trigger points, with a 15/30 gauge solid filament needle, under aseptic technique. Indication(s): [] Muscle spasms [x] Myalgia/Myositis  [] Muscle cramps      [x] Muscle imbalances [] TMD (TMJ) [] Myofascial pain & dysfunction        Chart reviewed for the following:  Alexey JONES, PT, have reviewed the medical history, summary list and precautions/contraindications for [employer].      TIME OUT performed immediately prior to start of procedure:  800am (enter time the timeout was completed)  Alexey JONES, PT, have performed the following reviews on [employer] prior to the start of the session:      [x] Patient was identified by name and date of birth    [x] Agreement on all muscles being treated was verified   [x] Purpose of dry needling, side effects, possible complications, risks and benefits were explained to the patient   [x] Procedure site(s) verified  [x] Patient was positioned for comfort and draped for privacy  [x] Informed Consent was signed (initial visit) and verified verbally (subsequent visits)  [x] Patient was instructed on the need to report the use of blood thinners and/or immunosuppressant medications  [x] How to respond to possible adverse effects of treatment  [x] Self treatment of post needling soreness: ice, heat (moist heat, heat wraps) and stretching  [x] Opportunity was given to ask any questions, all questions were answered            Treatment:  The following muscles were treated today:    Right:    Left: HS multiple locations      Patients response to todays treatment: []  LTRs  []  Muscle Relaxation  []  Pain Relief  []  Decreased Tinnitus  []  Decreased HAs []  Post needling soreness []  Increased ROM   []  Other:        x min Patient Education: [x] Review HEP from IE  - cont extension based program        Other Objective/Functional Measures: Therex initiated today - first FU post eval      Pain Level (0-10 scale) post treatment: 3    ASSESSMENT/Changes in Function: Patient not seen in 2 weeks sec to transfer process. Patient reports min LE pain today and more LS stiffness. Cortisone shot last week with moderate pain reduction. Patient tolerated initiation of therex well with only focus on spinal extension to decrease radicular pain. 100% VC for form and technique for all newly introduced therex. Initiated traction for LS decompression     Patient will continue to benefit from skilled PT services to modify and progress therapeutic interventions, address functional mobility deficits, address ROM deficits, address strength deficits, analyze and address soft tissue restrictions, analyze and cue movement patterns, analyze and modify body mechanics/ergonomics and assess and modify postural abnormalities to attain remaining goals. [x]  See Plan of Care  []  See progress note/recertification  []  See Discharge Summary         Progress towards goals / Updated goals:  FIRST FU TREATMENT - CONT PER POT TO EST GOALS 6/21/2019   · Short Term Goals: To be accomplished in  1  weeks:  1.  Pt will be independent and compliant with HEP - Goal progressing - HEP est with no questions. HEP will be modified and progressed as appropriate - cont extension based program  6/21/19  · Long Term Goals: To be accomplished in 16  treatments:  1.  Patient will increase FOTO score to 54/100 for indications of increased functional mobility.    2.  Patient will demo negative seated slump with 5 deg DF AROM for improved neural gliding to decrease pain with LS flexion activities   3.  Patient will demo 100% bridge with pain less than 3/10 for indication of improved core strength for decreased compression with sitting at work   4.  Patient will demo increased L ext owen strength to  4-/5 for decreased LS strength with car transfers     PLAN  [x]  Upgrade activities as tolerated     []  Continue plan of care  [x]  Update interventions per flow sheet       []  Discharge due to:_  [x]  Other:_assess response to first FU treatment       Saadia Gutierrez, PT 6/21/2019

## 2019-06-24 ENCOUNTER — HOSPITAL ENCOUNTER (OUTPATIENT)
Dept: PHYSICAL THERAPY | Age: 39
Discharge: HOME | End: 2019-06-24
Payer: OTHER GOVERNMENT

## 2019-06-24 PROCEDURE — 97012 MECHANICAL TRACTION THERAPY: CPT

## 2019-06-24 PROCEDURE — 97140 MANUAL THERAPY 1/> REGIONS: CPT

## 2019-06-24 NOTE — PROGRESS NOTES
[facility] PT DAILY TREATMENT NOTE     Patient Name: Rambo Turner  Date:2019  : 1980  [x]  Patient  Verified  Payor:  / Plan: [facility] REGION / Product Type:  /    In time:745 Out time:850  Total Treatment Time (min): 65  Visit #: 3 of     Treatment Area: Lower back pain [M54.5]    SUBJECTIVE  Pain Level (0-10 scale): 4  Any medication changes, allergies to medications, adverse drug reactions, diagnosis change, or new procedure performed?: [x] No    [] Yes (see summary sheet for update)  Subjective functional status/changes:   [] No changes reported  \"The bones on the top of my foot are just really sore \"    OBJECTIVE  Modality rationale: increase tissue extensibility to improve the patients ability to promote spinal decompression and dec radicualr pain    Min Type Additional Details    [] Estim: []Att   []Unatt        []TENS instruct                  []IFC  []Premod   []NMES                     []Other:  []w/US   []w/ice   []w/heat  Position:  Location:   15 [x]  Traction: LS in supine - 3 step intermittent  Lbs:100/50      []  Ultrasound: []Continuous   [] Pulsed                           []1MHz   []3MHz Location:  W/cm2:    []  Iontophoresis with dexamethasone         Location: [] Take home patch   [] In clinic   10 [x]  Ice  - post traction    []  heat  []  Ice massage Position: semi recline  Location: LS     []  Vasopneumatic Device Pressure:       [] lo [] med [] hi   Temperature: [] lo [] med [] hi   [x] Skin assessment post-treatment:  [x]intact []redness- no adverse reaction       []redness - adverse reaction:       10 (NC) min Therapeutic Exercise:  [x] See flow sheet : LIZETH for decompression    Rationale: increase ROM and decrease radicualr sx  to improve the patients ability to progress activity tolerance with decreased pain levles     30 min Manual Therapy: Technique:      IMT NC   Post needling ischemic compression   L HS rolling   L mid foot mobs  Taping for HS inhibition - 3 I strips (TAPING EDUCATION)    Rationale:      decrease pain, increase ROM, increase tissue extensibility and decrease trigger points to improve patient's ability to decrease pain with activities such as walking, standing and sitting     Dry Needling Procedure Note    Dry Needle Session Number:  2    Procedure: An intramuscular manual therapy (dry needling) and a neuro-muscular re-education treatment was done to deactivate myofascial trigger points, with a 15/30 gauge solid filament needle, under aseptic technique. Indication(s): [] Muscle spasms [x] Myalgia/Myositis  [] Muscle cramps      [x] Muscle imbalances [] TMD (TMJ) [] Myofascial pain & dysfunction        Chart reviewed for the following:  Danielle JONES PT, have reviewed the medical history, summary list and precautions/contraindications for [employer].      TIME OUT performed immediately prior to start of procedure:  745am (enter time the timeout was completed)  Danielle JONES PT, have performed the following reviews on [employer] prior to the start of the session:      [x] Patient was identified by name and date of birth    [x] Agreement on all muscles being treated was verified   [x] Purpose of dry needling, side effects, possible complications, risks and benefits were explained to the patient   [x] Procedure site(s) verified  [x] Patient was positioned for comfort and draped for privacy  [x] Informed Consent was signed (initial visit) and verified verbally (subsequent visits)  [x] Patient was instructed on the need to report the use of blood thinners and/or immunosuppressant medications  [x] How to respond to possible adverse effects of treatment  [x] Self treatment of post needling soreness: ice, heat (moist heat, heat wraps) and stretching  [x] Opportunity was given to ask any questions, all questions were answered            Treatment:  The following muscles were treated today:    Right:    Left: HS multiple locations      Patients response to todays treatment:   []  LTRs  []  Muscle Relaxation  []  Pain Relief  []  Decreased Tinnitus  []  Decreased HAs [x]  Post needling soreness []  Increased ROM   []  Other:        x min Patient Education: [x] Review HEP from IE  - cont extension based program   , taping      Other Objective/Functional Measures:      TC sec to patient arrived 15 min late    Gait - antalgic sec to L foot pain       Pain Level (0-10 scale) post treatment: 3    ASSESSMENT/Changes in Function: Patient reports first FU/ dry needling session caused muld stiffness \"but its was better than I thought it would be. \"  Unable to initiate any new therex sec to TC / patient late. Initiated taping for L HS inhibition sec to possible reflexive spasm ing. Noted poor midfoot mobility with MT today after patient co L foot pain. Also educated patient on desensitization after nerve damage including towel massage     Patient will continue to benefit from skilled PT services to modify and progress therapeutic interventions, address functional mobility deficits, address ROM deficits, address strength deficits, analyze and address soft tissue restrictions, analyze and cue movement patterns, analyze and modify body mechanics/ergonomics and assess and modify postural abnormalities to attain remaining goals. [x]  See Plan of Care  []  See progress note/recertification  []  See Discharge Summary         Progress towards goals / Updated goals: All goals reviewed and progressing appropriately as of 6/24/2019  · Short Term Goals: To be accomplished in  1  weeks:  1.  Pt will be independent and compliant with HEP - Goal progressing - HEP est with no questions. HEP will be modified and progressed as appropriate - cont extension based program  6/21/19  · Long Term Goals: To be accomplished in 16  treatments:  1.  Patient will increase FOTO score to 54/100 for indications of increased functional mobility.    2.  Patient will demo negative seated slump with 5 deg DF AROM for improved neural gliding to decrease pain with LS flexion activities   3. Patient will demo 100% bridge with pain less than 3/10 for indication of improved core strength for decreased compression with sitting at work   4.  Patient will demo increased L ext owen strength to  4-/5 for decreased LS strength with car transfers     PLAN  [x]  Upgrade activities as tolerated     []  Continue plan of care  [x]  Update interventions per flow sheet       []  Discharge due to:_  [x]  Other:_assess response to taping    Millicent Stuart, PT 6/24/2019

## 2019-06-27 ENCOUNTER — HOSPITAL ENCOUNTER (OUTPATIENT)
Dept: PHYSICAL THERAPY | Age: 39
Discharge: HOME | End: 2019-06-27
Payer: OTHER GOVERNMENT

## 2019-06-27 PROCEDURE — 97012 MECHANICAL TRACTION THERAPY: CPT

## 2019-06-27 PROCEDURE — 97110 THERAPEUTIC EXERCISES: CPT

## 2019-06-27 PROCEDURE — 97140 MANUAL THERAPY 1/> REGIONS: CPT

## 2019-06-27 NOTE — PROGRESS NOTES
PT DAILY TREATMENT NOTE 8    Patient Name: Gladys Brown  Date:2019  : 1980  [x]  Patient  Verified  Payor:  / Plan: [facility] / Product Type:  /    In time:6:02  Out time:7:03  Total Treatment Time (min): 61  Total Timed Codes (min): 46  1:1 Treatment Time (min): 46   Visit #: 4 of     Treatment Area: Lower back pain [M54.5]    SUBJECTIVE  Pain Level (0-10 scale): 2-3  Any medication changes, allergies to medications, adverse drug reactions, diagnosis change, or new procedure performed?: [x] No    [] Yes (see summary sheet for update)  Subjective functional status/changes:   [] No changes reported  The L HS is just gritty     Response to taping: the tape felt good        OBJECTIVE  Modality rationale: decrease inflammation, decrease pain and increase tissue extensibility to improve the patients ability to ambulate, ADls, sit, lift    Min Type Additional Details    [] Estim: []Att   []Unatt        []TENS instruct                  []IFC  []Premod   []NMES                     []Other:  []w/US   []w/ice   []w/heat  Position:  Location:   12 + 3 set-up [x]  Traction: [] Cervical       [x]Lumbar in supine, 3 step intermittent                       Lbs: 100/50  [] before manual  [x] after manual    []  Ultrasound: []Continuous   [] Pulsed                           []1MHz   []3MHz Location:  W/cm2:    []  Iontophoresis with dexamethasone         Location: [] Take home patch   [] In clinic   10 [x]  Ice    - post traction  Position: semireclined  Location: L/S   [x] Skin assessment post-treatment:  [x]intact []redness- no adverse reaction       []redness - adverse reaction:       21 min Therapeutic Exercise:  [x] See flow sheet :   Rationale: increase ROM to improve the patients ability to sit, stand, ambulation      25 min Manual Therapy: Technique:      IMT NC   Post needling ischemic compression   L HS IASTM to medial HS in prone   L mid foot mobs - held today Taping for HS inhibition - 2 I strips (TAPING EDUCATION)    Rationale:      decrease pain, increase ROM, increase tissue extensibility and decrease trigger points to improve patient's ability to ambulate, work, sit,     Dry Needling Procedure Note    Dry Needle Session Number:  2    Procedure: An intramuscular manual therapy (dry needling) and a neuro-muscular re-education treatment was done to deactivate myofascial trigger points, with a 15/30 gauge solid filament needle, under aseptic technique. Indication(s): [] Muscle spasms [x] Myalgia/Myositis  [x] Muscle cramps      [x] Muscle imbalances [] TMD (TMJ) [] Myofascial pain & dysfunction     [] Other: __    Chart reviewed for the following:  Glory JONES PT, have reviewed the medical history, summary list and precautions/contraindications for [employer].     TIME OUT performed immediately prior to start of procedure:  6:20  (enter time the timeout was completed)  Glory JONES PT, have performed the following reviews on [employer] prior to the start of the session:      [x] Patient was identified by name and date of birth    [x] Agreement on all muscles being treated was verified   [x] Purpose of dry needling, side effects, possible complications, risks and benefits were explained to the patient   [x] Procedure site(s) verified  [x] Patient was positioned for comfort and draped for privacy  [x] Informed Consent was signed (initial visit) and verified verbally (subsequent visits)  [x] Patient was instructed on the need to report the use of blood thinners and/or immunosuppressant medications  [x] How to respond to possible adverse effects of treatment  [x] Self treatment of post needling soreness: ice, heat (moist heat, heat wraps) and stretching  [x] Opportunity was given to ask any questions, all questions were answered            Treatment:  The following muscles were treated today:    Right:    Left: Various locations in the medial HS     Patients response to todays treatment:   []  LTRs  [x]  Muscle Relaxation  []  Pain Relief  []  Decreased Tinnitus  []  Decreased HAs [x]  Post needling soreness []  Increased ROM   []  Other:        x min Patient Education: [x] Review HEP    [] Progressed/Changed HEP based on:   [] positioning   [] body mechanics   [] transfers   [] heat/ice application        Other Objective/Functional Measures:     Pain Level (0-10 scale) post treatment: 1-2 in lumbar only     ASSESSMENT/Changes in Function: Gritty nodules in the (L) medial HS, no LTR's elicited but pt does note she is responding well to IMT for pain relief. Discussed chairs/mechanics to improve positioning and pain at work. Pt con't to respond well to taping for L HS inhibition. Patient will continue to benefit from skilled PT services to modify and progress therapeutic interventions, address functional mobility deficits, address ROM deficits, address strength deficits, analyze and address soft tissue restrictions, analyze and cue movement patterns, analyze and modify body mechanics/ergonomics and assess and modify postural abnormalities to attain remaining goals. []  See Plan of Care  []  See progress note/recertification  []  See Discharge Summary         Progress towards goals / Updated goals: · Short Term Goals: To be accomplished in  1  weeks:  1.  Pt will be independent and compliant with HEP - Goal progressing - HEP est with no questions. HEP will be modified and progressed as appropriate - cont extension based program  6/21/19  · Long Term Goals: To be accomplished in 16  treatments:  1.  Patient will increase FOTO score to 54/100 for indications of increased functional mobility.    2.  Patient will demo negative seated slump with 5 deg DF AROM for improved neural gliding to decrease pain with LS flexion activities   3.  Patient will demo 100% bridge with pain less than 3/10 for indication of improved core strength for decreased compression with sitting at work  Noting HS cramping with bridging (6/27/19)  4. Patient will demo increased L ext owen strength to  4-/5 for decreased LS strength with car transfers     PLAN  [x]  Upgrade activities as tolerated     []  Continue plan of care  []  Update interventions per flow sheet       []  Discharge due to:_  [x]  Other:_  Formally assess goals NV.  Approaching PN on 7/7/19  Bladimir Pierre DPT 6/27/2019  1:45 PM

## 2019-07-01 ENCOUNTER — HOSPITAL ENCOUNTER (OUTPATIENT)
Dept: PHYSICAL THERAPY | Age: 39
Discharge: HOME | End: 2019-07-01
Payer: OTHER GOVERNMENT

## 2019-07-01 PROCEDURE — 97012 MECHANICAL TRACTION THERAPY: CPT

## 2019-07-01 PROCEDURE — 97140 MANUAL THERAPY 1/> REGIONS: CPT

## 2019-07-01 PROCEDURE — 97110 THERAPEUTIC EXERCISES: CPT

## 2019-07-01 NOTE — PROGRESS NOTES
PT DAILY TREATMENT NOTE     Patient Name: Steffany Higgins  Date:2019  : 1980  [x]  Patient  Verified  Payor:  / Plan: [facility]  [facility] REGION / Product Type:  /    In time: 0  Out time:925  Total Treatment Time (min): 80  Visit #: 5 of     Treatment Area: Lower back pain [M54.5]    SUBJECTIVE  Pain Level (0-10 scale): 4  Any medication changes, allergies to medications, adverse drug reactions, diagnosis change, or new procedure performed?: [x] No    [] Yes (see summary sheet for update)  Subjective functional status/changes:   [] No changes reported  \"I still have that pain when I sit at work. \"    OBJECTIVE  Modality rationale: decrease inflammation, decrease pain and increase tissue extensibility to improve the patients ability to ambulate, ADls, sit, lift    Min Type Additional Details    [] Estim: []Att   []Unatt        []TENS instruct                  []IFC  []Premod   []NMES                     []Other:  []w/US   []w/ice   []w/heat  Position:  Location:   15 [x]  Traction:       [x]Lumbar in supine, 3 step intermittent                       Lbs: 100/50  [] before manual  [x] after manual    []  Ultrasound: []Continuous   [] Pulsed                           []1MHz   []3MHz Location:  W/cm2:    []  Iontophoresis with dexamethasone         Location: [] Take home patch   [] In clinic   10 [x]  Ice    - post traction  Position: semireclined  Location: L/S   [x] Skin assessment post-treatment:  [x]intact []redness- no adverse reaction       []redness - adverse reaction:       20 min Therapeutic Exercise:  [x] See flow sheet :   Rationale: increase ROM to improve the patients ability to sit, stand, ambulation      35 min Manual Therapy: Technique:      IMT NC   Post needling ischemic compression   L HS IASTM to  HS in prone  Supine HS stretch manual    Taping for HS inhibition - 1 I strips, 1 X pattern over HS insertion    Rationale:      decrease pain, increase ROM, increase tissue extensibility and decrease trigger points to improve patient's ability to ambulate, work, sit,     Dry Needling Procedure Note    Dry Needle Session Number:  4    Procedure: An intramuscular manual therapy (dry needling) and a neuro-muscular re-education treatment was done to deactivate myofascial trigger points, with a 15/30 gauge solid filament needle, under aseptic technique. Indication(s): [] Muscle spasms [x] Myalgia/Myositis  [x] Muscle cramps      [x] Muscle imbalances [] TMD (TMJ) [] Myofascial pain & dysfunction     [] Other: __    Chart reviewed for the following:  Asher JONES PT, have reviewed the medical history, summary list and precautions/contraindications for [employer].     TIME OUT performed immediately prior to start of procedure:  815am  (enter time the timeout was completed)  Asher JONES PT, have performed the following reviews on [employer] prior to the start of the session:      [x] Patient was identified by name and date of birth    [x] Agreement on all muscles being treated was verified   [x] Purpose of dry needling, side effects, possible complications, risks and benefits were explained to the patient   [x] Procedure site(s) verified  [x] Patient was positioned for comfort and draped for privacy  [x] Informed Consent was signed (initial visit) and verified verbally (subsequent visits)  [x] Patient was instructed on the need to report the use of blood thinners and/or immunosuppressant medications  [x] How to respond to possible adverse effects of treatment  [x] Self treatment of post needling soreness: ice, heat (moist heat, heat wraps) and stretching  [x] Opportunity was given to ask any questions, all questions were answered            Treatment:  The following muscles were treated today:    Right:    Left: Various locations in the all HS     Patients response to todays treatment:   []  LTRs  [x]  Muscle Relaxation  []  Pain Relief  [] Decreased Tinnitus  []  Decreased HAs [x]  Post needling soreness []  Increased ROM   []  Other:        x min Patient Education: [x] Review HEP        Other Objective/Functional Measures:    PRE progression - Prone heel press and prone TKE for glut activation     Pain Level (0-10 scale) post treatment: 2     ASSESSMENT/Changes in Function: Patient  Reports with localized pain around the ischial tuberosity with prolonged sitting at work. Tightness noted with manual HS stretch. Also address with \"x\" taping at HS insertion. .  Educated on upcoming reassessment for PN     Patient will continue to benefit from skilled PT services to modify and progress therapeutic interventions, address functional mobility deficits, address ROM deficits, address strength deficits, analyze and address soft tissue restrictions, analyze and cue movement patterns, analyze and modify body mechanics/ergonomics and assess and modify postural abnormalities to attain remaining goals. [x]  See Plan of Care  []  See progress note/recertification  []  See Discharge Summary         Progress towards goals / Updated goals: WILL FORMALLY ASSESS ALL GOALS NV FOR 30 DAY PN 7/1/19    · Short Term Goals: To be accomplished in  1  weeks:  1.  Pt will be independent and compliant with HEP - Goal progressing - HEP est with no questions. HEP will be modified and progressed as appropriate - cont extension based program  6/21/19  · Long Term Goals: To be accomplished in 16  treatments:  1.  Patient will increase FOTO score to 54/100 for indications of increased functional mobility.    2.  Patient will demo negative seated slump with 5 deg DF AROM for improved neural gliding to decrease pain with LS flexion activities   3. Patient will demo 100% bridge with pain less than 3/10 for indication of improved core strength for decreased compression with sitting at work  Noting HS cramping with bridging (6/27/19)  4.  Patient will demo increased L ext owen strength to  4-/5 for decreased LS strength with car transfers     PLAN  [x]  Upgrade activities as tolerated     []  Continue plan of care  []  Update interventions per flow sheet       []  Discharge due to:_  [x]  Other:_  Formally assess goals NV.  Approaching PN on 7/7/19    Tray Westbrook DPT 7/1/2019

## 2019-07-03 ENCOUNTER — HOSPITAL ENCOUNTER (OUTPATIENT)
Dept: PHYSICAL THERAPY | Age: 39
Discharge: HOME | End: 2019-07-03
Payer: OTHER GOVERNMENT

## 2019-07-03 PROCEDURE — 97110 THERAPEUTIC EXERCISES: CPT

## 2019-07-03 PROCEDURE — 97012 MECHANICAL TRACTION THERAPY: CPT

## 2019-07-03 PROCEDURE — 97140 MANUAL THERAPY 1/> REGIONS: CPT

## 2019-07-03 NOTE — PROGRESS NOTES
PT DAILY TREATMENT NOTE     Patient Name: Francois Fields  Date:7/3/2019  : 1980  [x]  Patient  Verified  Payor:  / Plan: [facility]  [facility] REGION / Product Type:  /    In time: 12 Out time:910  Total Treatment Time (min): 70  Visit #: 6 of     Treatment Area: Lower back pain [M54.5]    SUBJECTIVE  Pain Level (0-10 scale): 5  Any medication changes, allergies to medications, adverse drug reactions, diagnosis change, or new procedure performed?: [x] No    [] Yes (see summary sheet for update)  Subjective functional status/changes:   [] No changes reported  \"Both legs seized up last night. \"    OBJECTIVE  Modality rationale: decrease inflammation, decrease pain and increase tissue extensibility to improve the patients ability to ambulate, ADls, sit, lift    Min Type Additional Details    [] Estim: []Att   []Unatt        []TENS instruct                  []IFC  []Premod   []NMES                     []Other:  []w/US   []w/ice   []w/heat  Position:  Location:   15 [x]  Traction:       [x]Lumbar in supine, 3 step intermittent                       Lbs: 100/50  [] before manual  [x] after manual    []  Ultrasound: []Continuous   [] Pulsed                           []1MHz   []3MHz Location:  W/cm2:    []  Iontophoresis with dexamethasone         Location: [] Take home patch   [] In clinic   10 [x]  Ice    - post traction  Position: semireclined  Location: L/S   [x] Skin assessment post-treatment:  [x]intact []redness- no adverse reaction       []redness - adverse reaction:       29 min Therapeutic Exercise:  [x] See flow sheet :   Rationale: increase ROM to improve the patients ability to sit, stand, ambulation      16 min Manual Therapy: Technique:      IMT NC   Post needling ischemic compression   B HS roller   X taping mid HS with 75% tension    Rationale:      decrease pain, increase ROM, increase tissue extensibility and decrease trigger points to improve patient's ability to ambulate, work, sit,     Dry Needling Procedure Note    Dry Needle Session Number:  5    Procedure: An intramuscular manual therapy (dry needling) and a neuro-muscular re-education treatment was done to deactivate myofascial trigger points, with a 15/30 gauge solid filament needle, under aseptic technique. Indication(s): [] Muscle spasms [x] Myalgia/Myositis  [x] Muscle cramps      [x] Muscle imbalances [] TMD (TMJ) [] Myofascial pain & dysfunction     [] Other: __    Chart reviewed for the following:  Kody JONES PT, have reviewed the medical history, summary list and precautions/contraindications for [employer].     TIME OUT performed immediately prior to start of procedure:  815am (enter time the timeout was completed)  Kody JONES PT, have performed the following reviews on [employer] prior to the start of the session:      [x] Patient was identified by name and date of birth    [x] Agreement on all muscles being treated was verified   [x] Purpose of dry needling, side effects, possible complications, risks and benefits were explained to the patient   [x] Procedure site(s) verified  [x] Patient was positioned for comfort and draped for privacy  [x] Informed Consent was signed (initial visit) and verified verbally (subsequent visits)  [x] Patient was instructed on the need to report the use of blood thinners and/or immunosuppressant medications  [x] How to respond to possible adverse effects of treatment  [x] Self treatment of post needling soreness: ice, heat (moist heat, heat wraps) and stretching  [x] Opportunity was given to ask any questions, all questions were answered            Treatment:  The following muscles were treated today:    Right: MId HS    Left: Mid HS, L QL      Patients response to todays treatment:   []  LTRs  [x]  Muscle Relaxation  []  Pain Relief  []  Decreased Tinnitus  []  Decreased HAs [x]  Post needling soreness []  Increased ROM   []  Other: x min Patient Education: [x] Review HEP        Other Objective/Functional Measures:    Slump : +     Hip extension : 3/5     Pain Level (0-10 scale) post treatment: 4     ASSESSMENT/Changes in Function: Patient cont to benefit from current treatment of IMT and traction. Poor glut recruitment with hip extension sec to strength deficits. HEP compliance is questionable. Cont posterior chain tightness. Address B LE today and L QL with IMT. Excellent response to QL IMT. Patient will continue to benefit from skilled PT services to modify and progress therapeutic interventions, address functional mobility deficits, address ROM deficits, address strength deficits, analyze and address soft tissue restrictions, analyze and cue movement patterns, analyze and modify body mechanics/ergonomics and assess and modify postural abnormalities to attain remaining goals. [x]  See Plan of Care  []  See progress note/recertification  []  See Discharge Summary         Progress towards goals / Updated goals: · Short Term Goals: To be accomplished in  1  weeks:  1.  Pt will be independent and compliant with HEP - Goal progressing - HEP est with no questions. HEP will be modified and progressed as appropriate - cont extension based program  6/21/19  · Long Term Goals: To be accomplished in 16  treatments:  1.  Patient will increase FOTO score to 54/100 for indications of increased functional mobility.    2.  Patient will demo negative seated slump with 5 deg DF AROM for improved neural gliding to decrease pain with LS flexion activities  Goal not met - + slump sec to posterior chain tightness  7/3/19  3. Patient will demo 100% bridge with pain less than 3/10 for indication of improved core strength for decreased compression with sitting at work  Noting HS cramping with bridging (6/27/19)  4.  Patient will demo increased L ext owen strength to  4-/5 for decreased LS strength with car transfers     PLAN  [x]  Upgrade activities as tolerated     []  Continue plan of care  []  Update interventions per flow sheet       []  Discharge due to:_  [x]  Other:_  Formally assess goals NV   Progress HEP NV     Sailaja Montemayor DPJAIME 7/3/2019

## 2019-07-08 ENCOUNTER — HOSPITAL ENCOUNTER (OUTPATIENT)
Dept: PHYSICAL THERAPY | Age: 39
Discharge: HOME | End: 2019-07-08
Payer: OTHER GOVERNMENT

## 2019-07-08 PROCEDURE — 97012 MECHANICAL TRACTION THERAPY: CPT

## 2019-07-08 PROCEDURE — 97140 MANUAL THERAPY 1/> REGIONS: CPT

## 2019-07-08 PROCEDURE — 97110 THERAPEUTIC EXERCISES: CPT

## 2019-07-08 NOTE — PROGRESS NOTES
PT DAILY TREATMENT NOTE     Patient Name: Tenisha Borrego  Date:2019  : 1980  [x]  Patient  Verified  Payor:  / Plan: [facility]  [facility] REGION / Product Type:  /    In time: 0 Out time:925  Total Treatment Time (min): 80  Visit #: 7 of     Treatment Area: Lower back pain [M54.5]    SUBJECTIVE  Pain Level (0-10 scale): 5  Any medication changes, allergies to medications, adverse drug reactions, diagnosis change, or new procedure performed?: [x] No    [] Yes (see summary sheet for update)  Subjective functional status/changes:   [] No changes reported  \"I have a second opinion next week. I don't get to see Dr Jesus Nina till the .  \"    OBJECTIVE  Modality rationale: decrease inflammation, decrease pain and increase tissue extensibility to improve the patients ability to ambulate, ADls, sit, lift    Min Type Additional Details    [] Estim: []Att   []Unatt        []TENS instruct                  []IFC  []Premod   []NMES                     []Other:  []w/US   []w/ice   []w/heat  Position:  Location:   15 [x]  Traction:       [x]Lumbar in supine, 3 step intermittent                       Lbs: 100/50  [] before manual  [x] after manual    []  Ultrasound: []Continuous   [] Pulsed                           []1MHz   []3MHz Location:  W/cm2:    []  Iontophoresis with dexamethasone         Location: [] Take home patch   [] In clinic   10 [x]  Ice    - post traction  Position: semireclined  Location: L/S   [x] Skin assessment post-treatment:  [x]intact []redness- no adverse reaction       []redness - adverse reaction:       40 min Therapeutic Exercise:  [x] See flow sheet :REASSESS, FOTO, ADDED PRONE HS CURLS    Rationale: increase ROM to improve the patients ability to sit, stand, ambulation      15 min Manual Therapy: Technique:      IMT NC   Post needling ischemic compression   B HS roller    Rationale:      decrease pain, increase ROM, increase tissue extensibility and decrease trigger points to improve patient's ability to ambulate, work, sit,     Dry Needling Procedure Note    Dry Needle Session Number:  6    Procedure: An intramuscular manual therapy (dry needling) and a neuro-muscular re-education treatment was done to deactivate myofascial trigger points, with a 15/30 gauge solid filament needle, under aseptic technique. Indication(s): [] Muscle spasms [x] Myalgia/Myositis  [x] Muscle cramps      [x] Muscle imbalances [] TMD (TMJ) [] Myofascial pain & dysfunction     [] Other: __    Chart reviewed for the following:  Gurdeep JONES PT, have reviewed the medical history, summary list and precautions/contraindications for [employer].     TIME OUT performed immediately prior to start of procedure:  805am(enter time the timeout was completed)  Gurdeep JONES PT, have performed the following reviews on [employer] prior to the start of the session:      [x] Patient was identified by name and date of birth    [x] Agreement on all muscles being treated was verified   [x] Purpose of dry needling, side effects, possible complications, risks and benefits were explained to the patient   [x] Procedure site(s) verified  [x] Patient was positioned for comfort and draped for privacy  [x] Informed Consent was signed (initial visit) and verified verbally (subsequent visits)  [x] Patient was instructed on the need to report the use of blood thinners and/or immunosuppressant medications  [x] How to respond to possible adverse effects of treatment  [x] Self treatment of post needling soreness: ice, heat (moist heat, heat wraps) and stretching  [x] Opportunity was given to ask any questions, all questions were answered            Treatment:  The following muscles were treated today:    Right: Lateral HS an dglut max , R QL   Left: Mid HS, HS proximal      Patients response to todays treatment:   []  LTRs  [x]  Muscle Relaxation  []  Pain Relief  []  Decreased Tinnitus  [] Decreased HAs [x]  Post needling soreness []  Increased ROM   []  Other:        x min Patient Education: [x] Review HEP        Other Objective/Functional Measures:    Goals assessed for PN   Pain at best 2/10, at worst 6/10  Subjective % improvement 70%  Objective:    LS AROM flexion WNL - UE assistance to stand, WNL all other directions    Core/ bridge dec 50%   Slump +    Hip strength: flexion 3+/5, ABD 3, Extension 3  Improvements: decreased pain, increased general mobility   Deficits decreased nerve sensation L knee, L knee collapsing       Pain Level (0-10 scale) post treatment: 4     ASSESSMENT/Changes in Function: SEE PN   Patient will continue to benefit from skilled PT services to modify and progress therapeutic interventions, address functional mobility deficits, address ROM deficits, address strength deficits, analyze and address soft tissue restrictions, analyze and cue movement patterns, analyze and modify body mechanics/ergonomics and assess and modify postural abnormalities to attain remaining goals. []  See Plan of Care  [x]  See progress note/recertification 8/5/63  []  See Discharge Summary         Progress towards goals / Updated goals: · Short Term Goals: To be accomplished in  1  weeks:  1.  Pt will be independent and compliant with HEP - Goal progressing - patient reports compliance with extension stretching but not strength training at this point   · [address] be accomplished in 16  treatments:  1.  Patient will increase FOTO score to 43/100 for indications of increased functional mobility.  Goal met at 51/100  2.  Patient will demo negative seated slump with 5 deg DF AROM for improved neural gliding to decrease pain with LS flexion activities  Goal not met - + slump sec to posterior chain tightness  3.  Patient will demo 100% bridge with pain less than 3/10 for indication of improved core strength for decreased compression with sitting at work  Goal not met - bridge = 50%  4.  Patient will demo increased L ext owen strength to  4-/5 for decreased LS strength with car transfers  Goal not met at 3/5     PLAN  [x]  Upgrade activities as tolerated     []  Continue plan of care  []  Update interventions per flow sheet       []  Discharge due to:_  [x]  Other:_cont per PN -1-2x 8-12 sessions as needed  Progress core strengthening      Tevin Bowman DPT 7/8/2019

## 2019-07-10 ENCOUNTER — HOSPITAL ENCOUNTER (OUTPATIENT)
Dept: PHYSICAL THERAPY | Age: 39
Discharge: HOME | End: 2019-07-10
Payer: OTHER GOVERNMENT

## 2019-07-10 PROCEDURE — 97012 MECHANICAL TRACTION THERAPY: CPT

## 2019-07-10 PROCEDURE — 97110 THERAPEUTIC EXERCISES: CPT

## 2019-07-10 PROCEDURE — 97140 MANUAL THERAPY 1/> REGIONS: CPT

## 2019-07-10 NOTE — PROGRESS NOTES
PT DAILY TREATMENT NOTE     Patient Name: Chris Hebert  Date:7/10/2019  : 1980  [x]  Patient  Verified  Payor:  / Plan: [facility]  [facility] REGION / Product Type:  /    In time: 908 Out time:923  Total Treatment Time (min): 77  Visit #: 1 of     Treatment Area: Lower back pain [M54.5]    SUBJECTIVE  Pain Level (0-10 scale): 3  Any medication changes, allergies to medications, adverse drug reactions, diagnosis change, or new procedure performed?: [x] No    [] Yes (see summary sheet for update)  Subjective functional status/changes:   [] No changes reported  \"My back is just bothersome and me knee kept giving out on me this morning. \"    OBJECTIVE  Modality rationale: decrease inflammation, decrease pain and increase tissue extensibility to improve the patients ability to ambulate, ADls, sit, lift    Min Type Additional Details    [] Estim: []Att   []Unatt        []TENS instruct                  []IFC  []Premod   []NMES                     []Other:  []w/US   []w/ice   []w/heat  Position:  Location:   15 [x]  Traction:       [x]Lumbar in supine, 3 step intermittent                       Lbs: 100/50  [] before manual  [x] after manual    []  Ultrasound: []Continuous   [] Pulsed                           []1MHz   []3MHz Location:  W/cm2:    []  Iontophoresis with dexamethasone         Location: [] Take home patch   [] In clinic   10 [x]  Ice    - post traction  Position: semireclined  Location: L/S   [x] Skin assessment post-treatment:  [x]intact []redness- no adverse reaction       []redness - adverse reaction:       40 min Therapeutic Exercise:  [x] See flow sheet :   Rationale: increase ROM to improve the patients ability to sit, stand, ambulation      12 min Manual Therapy: Technique:      IMT NC   Post needling ischemic compression   Manual calf stretch    Rationale:      decrease pain, increase ROM, increase tissue extensibility and decrease trigger points to improve patient's ability to ambulate, work, sit,     Dry Needling Procedure Note    Dry Needle Session Number:  7    Procedure: An intramuscular manual therapy (dry needling) and a neuro-muscular re-education treatment was done to deactivate myofascial trigger points, with a 15/30 gauge solid filament needle, under aseptic technique. Indication(s): [] Muscle spasms [x] Myalgia/Myositis  [x] Muscle cramps      [x] Muscle imbalances [] TMD (TMJ) [] Myofascial pain & dysfunction     [] Other: __    Chart reviewed for the following:  Vernon JONES PT, have reviewed the medical history, summary list and precautions/contraindications for [employer].     TIME OUT performed immediately prior to start of procedure:  810am (enter time the timeout was completed)  Vernon JONES PT, have performed the following reviews on [employer] prior to the start of the session:      [x] Patient was identified by name and date of birth    [x] Agreement on all muscles being treated was verified   [x] Purpose of dry needling, side effects, possible complications, risks and benefits were explained to the patient   [x] Procedure site(s) verified  [x] Patient was positioned for comfort and draped for privacy  [x] Informed Consent was signed (initial visit) and verified verbally (subsequent visits)  [x] Patient was instructed on the need to report the use of blood thinners and/or immunosuppressant medications  [x] How to respond to possible adverse effects of treatment  [x] Self treatment of post needling soreness: ice, heat (moist heat, heat wraps) and stretching  [x] Opportunity was given to ask any questions, all questions were answered            Treatment:  The following muscles were treated today:    Right: NT   Left: L3-5 paraspinals, L lateral calf      Patients response to todays treatment:   []  LTRs  [x]  Muscle Relaxation  []  Pain Relief  []  Decreased Tinnitus  []  Decreased HAs [x]  Post needling soreness [] Increased ROM   []  Other:        x min Patient Education: [x] Review HEP        Other Objective/Functional Measures:    PRE progression - TB anti rotation side step     Pain Level (0-10 scale) post treatment: 2     ASSESSMENT/Changes in Function: Patient presents with possible centralization of sx today with less leg pain and more central LS pain. CO L knee collapsing persists primary or secondary to calf spasms. Needled calf to address tightness today- will assess for response. Patient challenged by isometric core strengthening today. No large LTR noted with needling, but patient report pain relief with IMT to L4-5 parapsinal     Patient will continue to benefit from skilled PT services to modify and progress therapeutic interventions, address functional mobility deficits, address ROM deficits, address strength deficits, analyze and address soft tissue restrictions, analyze and cue movement patterns, analyze and modify body mechanics/ergonomics and assess and modify postural abnormalities to attain remaining goals. []  See Plan of Care  [x]  See progress note/recertification 3/1/54  []  See Discharge Summary         Progress towards goals / Updated goals:   2.  Patient will demo negative seated slump with 5 deg DF AROM for improved neural gliding to decrease pain with LS flexion activities  Goal not met - + slump sec to posterior chain tightness  3. Patient will demo 100% bridge with pain less than 3/10 for indication of improved core strength for decreased compression with sitting at work  Goal not met - bridge = 50%  4.  Patient will demo increased L ext owen strength to  4-/5 for decreased LS strength with car transfers  Goal not met at 3/5     PLAN  [x]  Upgrade activities as tolerated     []  Continue plan of care  []  Update interventions per flow sheet       []  Discharge due to:_  [x]  Other:_Patient to schedule more apts       Jessica Light DPT 7/10/2019

## 2019-07-15 ENCOUNTER — HOSPITAL ENCOUNTER (OUTPATIENT)
Dept: PHYSICAL THERAPY | Age: 39
Discharge: HOME | End: 2019-07-15
Payer: OTHER GOVERNMENT

## 2019-07-15 PROCEDURE — 97110 THERAPEUTIC EXERCISES: CPT

## 2019-07-15 PROCEDURE — 97140 MANUAL THERAPY 1/> REGIONS: CPT

## 2019-07-15 PROCEDURE — 97012 MECHANICAL TRACTION THERAPY: CPT

## 2019-07-15 NOTE — PROGRESS NOTES
PT DAILY TREATMENT NOTE     Patient Name: Mitchell An  Date:7/15/2019  : 1980  [x]  Patient  Verified  Payor:  / Plan: [facility]  [facility] REGION / Product Type:  /    In time: 200 Out time:824  Total Treatment Time (min): 81  Visit #: 2 of     Treatment Area: Lower back pain [M54.5]    SUBJECTIVE  Pain Level (0-10 scale): 3-4/10  Any medication changes, allergies to medications, adverse drug reactions, diagnosis change, or new procedure performed?: [x] No    [] Yes (see summary sheet for update)  Subjective functional status/changes:   [] No changes reported  \"I noticed my knee is still giving out on me. \"    OBJECTIVE  Modality rationale: decrease inflammation, decrease pain and increase tissue extensibility to improve the patients ability to ambulate, ADls, sit, lift    Min Type Additional Details    [] Estim: []Att   []Unatt        []TENS instruct                  []IFC  []Premod   []NMES                     []Other:  []w/US   []w/ice   []w/heat  Position:  Location:   15 [x]  Traction:       [x]Lumbar in supine, 3 step intermittent                       Lbs: 100/50  [] before manual  [x] after manual    []  Ultrasound: []Continuous   [] Pulsed                           []1MHz   []3MHz Location:  W/cm2:    []  Iontophoresis with dexamethasone         Location: [] Take home patch   [] In clinic   10 [x]  Ice    - post traction  Position: semireclined  Location: L/S   [x] Skin assessment post-treatment:  [x]intact []redness- no adverse reaction       []redness - adverse reaction:       43 min Therapeutic Exercise:  [x] See flow sheet :   Rationale: increase ROM to improve the patients ability to sit, stand, ambulation      13 min Manual Therapy: Technique:      IMT NC   Post needling ischemic compression   L posterior chain rolling   Manual HS stretch     Rationale:      decrease pain, increase ROM, increase tissue extensibility and decrease trigger points to improve patient's ability to ambulate, work, sit,     Dry Needling Procedure Note    Dry Needle Session Number:  8    Procedure: An intramuscular manual therapy (dry needling) and a neuro-muscular re-education treatment was done to deactivate myofascial trigger points, with a 15/30 gauge solid filament needle, under aseptic technique. Indication(s): [] Muscle spasms [x] Myalgia/Myositis  [x] Muscle cramps      [x] Muscle imbalances [] TMD (TMJ) [] Myofascial pain & dysfunction     [] Other: __    Chart reviewed for the following:  Nica JONES PT, have reviewed the medical history, summary list and precautions/contraindications for [employer].     TIME OUT performed immediately prior to start of procedure:  703am  (enter time the timeout was completed)  Nica JONES PT, have performed the following reviews on [employer] prior to the start of the session:      [x] Patient was identified by name and date of birth    [x] Agreement on all muscles being treated was verified   [x] Purpose of dry needling, side effects, possible complications, risks and benefits were explained to the patient   [x] Procedure site(s) verified  [x] Patient was positioned for comfort and draped for privacy  [x] Informed Consent was signed (initial visit) and verified verbally (subsequent visits)  [x] Patient was instructed on the need to report the use of blood thinners and/or immunosuppressant medications  [x] How to respond to possible adverse effects of treatment  [x] Self treatment of post needling soreness: ice, heat (moist heat, heat wraps) and stretching  [x] Opportunity was given to ask any questions, all questions were answered            Treatment:  The following muscles were treated today:    Right: NT   Left: L3-5 paraspinals, L lateral calf      Patients response to todays treatment:   []  LTRs  [x]  Muscle Relaxation  []  Pain Relief  []  Decreased Tinnitus  []  Decreased HAs [x]  Post needling soreness [] Increased ROM   []  Other:        x min Patient Education: [x] Review HEP        Other Objective/Functional Measures:    Pain at worst : 7/10, average 3-4/10    PPU - full ROM    Improvement: less LE pain since onset - no ER trips, less LS pain    Deficit - sitting at work causing L radicular sx      Pain Level (0-10 scale) post treatment: 3     ASSESSMENT/Changes in Function: Patient reports continued weakness in L knee causing near fall over the weekend. - addressed with wt'd SAQ to promote NM re ed. Patient will continue to benefit from skilled PT services to modify and progress therapeutic interventions, address functional mobility deficits, address ROM deficits, address strength deficits, analyze and address soft tissue restrictions, analyze and cue movement patterns, analyze and modify body mechanics/ergonomics and assess and modify postural abnormalities to attain remaining goals. []  See Plan of Care  [x]  See progress note/recertification 6/6/08  []  See Discharge Summary         Progress towards goals / Updated goals: All goals reviewed and progressing appropriately as of 7/15/2019- slow progress to goals   2.  Patient will demo negative seated slump with 5 deg DF AROM for improved neural gliding to decrease pain with LS flexion activities  Goal not met - + slump sec to posterior chain tightness  3. Patient will demo 100% bridge with pain less than 3/10 for indication of improved core strength for decreased compression with sitting at work  Goal not met - bridge = 50%  4.  Patient will demo increased L ext owen strength to  4-/5 for decreased LS strength with car transfers  Goal not met at 3/5     PLAN  [x]  Upgrade activities as tolerated     []  Continue plan of care  []  Update interventions per flow sheet       []  Discharge due to:_  [x]  Other:_Cont per PN      Celena Cornea DPT 7/15/2019

## 2019-07-17 ENCOUNTER — HOSPITAL ENCOUNTER (OUTPATIENT)
Dept: PHYSICAL THERAPY | Age: 39
Discharge: HOME | End: 2019-07-17
Payer: OTHER GOVERNMENT

## 2019-07-17 PROCEDURE — 97110 THERAPEUTIC EXERCISES: CPT

## 2019-07-17 PROCEDURE — 97140 MANUAL THERAPY 1/> REGIONS: CPT

## 2019-07-17 PROCEDURE — 97012 MECHANICAL TRACTION THERAPY: CPT

## 2019-07-17 NOTE — PROGRESS NOTES
PT DAILY TREATMENT NOTE     Patient Name: Isabel Batch  Date:2019  : 1980  [x]  Patient  Verified  Payor:  / Plan: [facility] / Product Type:  /    In time: 1 Out time:826  Total Treatment Time (min): 78  Visit #: 3 of     Treatment Area: Lower back pain [M54.5]    SUBJECTIVE  Pain Level (0-10 scale): 5/10  Any medication changes, allergies to medications, adverse drug reactions, diagnosis change, or new procedure performed?: [x] No    [] Yes (see summary sheet for update)  Subjective functional status/changes:   [] No changes reported  \"Achy. I didn't sleep well last night. My right hip and my left leg hurt. \"    OBJECTIVE  Modality rationale: decrease inflammation, decrease pain and increase tissue extensibility to improve the patients ability to ambulate, ADls, sit, lift    Min Type Additional Details    [] Estim: []Att   []Unatt        []TENS instruct                  []IFC  []Premod   []NMES                     []Other:  []w/US   []w/ice   []w/heat  Position:  Location:   15 [x]  Traction:       [x]Lumbar in supine, 3 step intermittent                       Lbs: 100/50  [] before manual  [x] after manual    []  Ultrasound: []Continuous   [] Pulsed                           []1MHz   []3MHz Location:  W/cm2:    []  Iontophoresis with dexamethasone         Location: [] Take home patch   [] In clinic   10 [x]  Ice    - post traction  Position: semireclined  Location: L/S   [x] Skin assessment post-treatment:  [x]intact []redness- no adverse reaction       []redness - adverse reaction:       36 min Therapeutic Exercise:  [x] See flow sheet :assess while in LIZETH   Rationale: increase ROM to improve the patients ability to sit, stand, ambulation      17 min Manual Therapy: Technique:      IMT NC   Post needling ischemic compression   Manual hip flexor stretch R   Manual HS stretch   SI check - WNL     Rationale:      decrease pain, increase ROM, increase tissue extensibility and decrease trigger points to improve patient's ability to ambulate, work, sit,     Dry Needling Procedure Note    Dry Needle Session Number:  9    Procedure: An intramuscular manual therapy (dry needling) and a neuro-muscular re-education treatment was done to deactivate myofascial trigger points, with a 15/30 gauge solid filament needle, under aseptic technique. Indication(s): [] Muscle spasms [x] Myalgia/Myositis  [x] Muscle cramps      [x] Muscle imbalances [] TMD (TMJ) [] Myofascial pain & dysfunction     [] Other: __    Chart reviewed for the following:  IMayelin Courser, PT, have reviewed the medical history, summary list and precautions/contraindications for [employer].     TIME OUT performed immediately prior to start of procedure:  708a(enter time the timeout was completed)  Mayelin JONESr, PT, have performed the following reviews on [employer] prior to the start of the session:      [x] Patient was identified by name and date of birth    [x] Agreement on all muscles being treated was verified   [x] Purpose of dry needling, side effects, possible complications, risks and benefits were explained to the patient   [x] Procedure site(s) verified  [x] Patient was positioned for comfort and draped for privacy  [x] Informed Consent was signed (initial visit) and verified verbally (subsequent visits)  [x] Patient was instructed on the need to report the use of blood thinners and/or immunosuppressant medications  [x] How to respond to possible adverse effects of treatment  [x] Self treatment of post needling soreness: ice, heat (moist heat, heat wraps) and stretching  [x] Opportunity was given to ask any questions, all questions were answered            Treatment:  The following muscles were treated today:    Right: LS paraspinal, psoas , QL    Left: NT      Patients response to todays treatment:   []  LTRs  [x]  Muscle Relaxation  []  Pain Relief  []  Decreased Tinnitus  []  Decreased HAs [x]  Post needling soreness []  Increased ROM   []  Other:        x min Patient Education: [x] Review HEP        Other Objective/Functional Measures:    Slump test : negative    + R hip flexor tightness     Pain Level (0-10 scale) post treatment: 4     ASSESSMENT/Changes in Function: Patient presents with co LS pain R sided today over R PSIS. With needling, patient had referred pain to ant hip. Assess hip flexor/ quad tightness - positive Chelsy. Address with manual stretch. Patient reports less pain after manual.  Cont IMT, therex and traction as part of comprehensive program for pain management to increase function. Patient will continue to benefit from skilled PT services to modify and progress therapeutic interventions, address functional mobility deficits, address ROM deficits, address strength deficits, analyze and address soft tissue restrictions, analyze and cue movement patterns, analyze and modify body mechanics/ergonomics and assess and modify postural abnormalities to attain remaining goals. []  See Plan of Care  [x]  See progress note/recertification 5/0/29  []  See Discharge Summary         Progress towards goals / Updated goals:   2.  Patient will demo negative seated slump with 5 deg DF AROM for improved neural gliding to decrease pain with LS flexion activities  Goal met - patient demo negative slump 7/17/19  3. Patient will demo 100% bridge with pain less than 3/10 for indication of improved core strength for decreased compression with sitting at work  Goal not met - bridge = 50%  4.  Patient will demo increased L ext owen strength to  4-/5 for decreased LS strength with car transfers  Goal not met at 3/5     PLAN  [x]  Upgrade activities as tolerated     []  Continue plan of care  []  Update interventions per flow sheet       []  Discharge due to:_  [x]  Other:_cont to strengthen core and quads     Job Boudreaux DPT 7/17/2019

## 2019-07-23 ENCOUNTER — OFFICE VISIT (OUTPATIENT)
Dept: ORTHOPEDIC SURGERY | Age: 39
End: 2019-07-23

## 2019-07-23 VITALS
WEIGHT: 275.8 LBS | HEART RATE: 77 BPM | SYSTOLIC BLOOD PRESSURE: 154 MMHG | BODY MASS INDEX: 41.8 KG/M2 | DIASTOLIC BLOOD PRESSURE: 98 MMHG | HEIGHT: 68 IN

## 2019-07-23 DIAGNOSIS — M54.50 NONSPECIFIC PAIN IN THE LUMBAR REGION: ICD-10-CM

## 2019-07-23 DIAGNOSIS — M51.26 HNP (HERNIATED NUCLEUS PULPOSUS), LUMBAR: Primary | ICD-10-CM

## 2019-07-23 DIAGNOSIS — M54.16 LUMBAR RADICULOPATHY: ICD-10-CM

## 2019-07-23 NOTE — PROGRESS NOTES
[address], [address]  [place]  Phone: [phone]  Fax: [phone]  PROGRESS NOTE  Patient: Uyen Zamora                MRN: [id number]       SSN: [id number]  YOB: 1980        AGE: 45 y.o. SEX: female  Body mass index is 41.94 kg/m². PCP: Elvis Bowman MD  07/23/19    Chief Complaint   Patient presents with    Back Pain     block fu       HISTORY OF PRESENT ILLNESS, RADIOGRAPHS, and PLAN:     HISTORY OF PRESENT ILLNESS:  Ms. Camilla Guzman returns today. She found the block dramatically effective in alleviating her radiculopathy. There is a small area of numbness in her leg and the physical therapy has been good at helping with her back spasm. She does find her daily activities are unaffected, and she is able to participate fully. She is neurologically intact. She sees a chiropractor for some of her neck symptoms. She is on no medications. I am pleased she is doing so well, especially given the size of this recurrent lumbar disc herniation. ASSESSMENT/PLAN: At this point, we will have her just continue her home exercise program.  I will see her back as needed. We certainly could repeat her block if her symptoms return. We want to try to stay away from surgery, and if surgery ends up being indicated, try to make the surgery the smallest possible procedure given the multilevel nature of her degenerative disease. cc: GAIL Santillan         Past Medical History:   Diagnosis Date    Cancer [facility])     skin at age 8, Thyroid 2005, Endometrial 2012    Chronic back pain     Sciatica of left side     Thyroid disease     hypothyroid       Family History   Problem Relation Age of Onset    No Known Problems Mother     No Known Problems Father        Current Outpatient Medications   Medication Sig Dispense Refill    cyclobenzaprine (FLEXERIL) 5 mg tablet       levothyroxine (SYNTHROID) 125 mcg tablet Take 175 mcg by mouth Daily (before breakfast).  celecoxib (CELEBREX) 100 mg capsule Take  by mouth two (2) times a day.  diazePAM (VALIUM) 5 mg tablet Take 5 mg by mouth every six (6) hours as needed for Anxiety.  gabapentin (NEURONTIN) 300 mg capsule Take 1 Cap by mouth three (3) times daily. 90 Cap 1    lidocaine (LIDODERM) 5 %       IBUPROFEN (ADVIL PO) Take  by mouth daily as needed.  HYDROcodone-acetaminophen (NORCO) 5-325 mg per tablet Take 1 Tab by mouth four (4) times daily as needed for Pain. Max Daily Amount: 4 Tabs. 40 Tab 0    cyclobenzaprine (FLEXERIL) 10 mg tablet Take 1 Tab by mouth three (3) times daily as needed for Muscle Spasm(s). Indications:  MUSCLE SPASM 40 Tab 0       Allergies   Allergen Reactions    Morphine Nausea and Vomiting    Prednisone Unknown (comments)     Leg cramps  Steroid injections ok    Sulfa (Sulfonamide Antibiotics) Hives       Past Surgical History:   Procedure Laterality Date    HX DILATION AND CURETTAGE  8213,5679    x2    HX HYSTERECTOMY      HX LUMBAR LAMINECTOMY  9/17/15    Julieta Fisher HX PARTIAL THYROIDECTOMY  2005       Past Medical History:   Diagnosis Date    Cancer [facility])     skin at age 8, Thyroid 2005, Endometrial 2012    Chronic back pain     Sciatica of left side     Thyroid disease     hypothyroid       Social History     Socioeconomic History    Marital status: UNKNOWN     Spouse name: Not on file    Number of children: Not on file    Years of education: Not on file    Highest education level: Not on file   Occupational History    Not on file   Social Needs    Financial resource strain: Not on file    Food insecurity:     Worry: Not on file     Inability: Not on file    Transportation needs:     Medical: Not on file     Non-medical: Not on file   Tobacco Use    Smoking status: Former Smoker     Last attempt to quit: 8/1/2015     Years since quitting: 3.9    Smokeless tobacco: Never Used   Substance and Sexual Activity  Alcohol use: Yes     Comment: socially    Drug use: No    Sexual activity: Not on file   Lifestyle    Physical activity:     Days per week: Not on file     Minutes per session: Not on file    Stress: Not on file   Relationships    Social connections:     Talks on phone: Not on file     Gets together: Not on file     Attends [employer] service: Not on file     Active member of club or organization: Not on file     Attends meetings of clubs or organizations: Not on file     Relationship status: Not on file    Intimate partner violence:     Fear of current or ex partner: Not on file     Emotionally abused: Not on file     Physically abused: Not on file     Forced sexual activity: Not on file   Other Topics Concern    Not on file   Social History Narrative    Not on file         REVIEW OF SYSTEMS:   CONSTITUTIONAL SYMPTOMS:  Negative. EYES:  Negative. EARS, NOSE, THROAT AND MOUTH:  Negative. CARDIOVASCULAR:  Negative. RESPIRATORY:  Negative. GENITOURINARY: Per HPI. GASTROINTESTINAL:  Per HPI. INTEGUMENTARY (SKIN AND/OR BREAST):  Negative. MUSCULOSKELETAL: Per HPI.   ENDOCRINE/RHEUMATOLOGIC:  Negative. NEUROLOGICAL:  Per HPI. HEMATOLOGIC/LYMPHATIC:  Negative. ALLERGIC/IMMUNOLOGIC:  Negative. PSYCHIATRIC:  Negative. PHYSICAL EXAMINATION:   Visit Vitals  BP (!) 154/98 (BP 1 Location: Left arm, BP Patient Position: Sitting)   Pulse 77   Ht 5' 8\" (1.727 m)   Wt 275 lb 12.8 oz (125.1 kg)   BMI 41.94 kg/m²    PAIN SCALE: 4/10    CONSTITUTIONAL: The patient is in no apparent distress and is alert and oriented x 3. HEENT: Normocephalic. Hearing grossly intact. NECK: Supple and symmetric. no tenderness, or masses were felt. RESPIRATORY: No labored breathing. CARDIOVASCULAR: The carotid pulses were normal. Peripheral pulses were 2+. CHEST: Normal AP diameter and normal contour without any kyphoscoliosis.   LYMPHATIC: No lymphadenopathy was appreciated in the neck, axillae or groin.  SKIN: Negative for scars, rashes, lesions, or ulcers on the right upper, right lower, left upper, left lower and trunk. NEUROLOGICAL: Alert and oriented x 3. Ambulation without assistive device. FWB. EXTREMITIES: See musculoskeletal.  MUSCULOSKELETAL:   Head and Neck:  Negative for misalignment, asymmetry, crepitation, defects, tenderness masses or effusions.  Left Upper Extremity: Inspection, percussion and palpation performed. Lymans sign is negative.  Right Upper Extremity: Inspection, percussion and palpation performed. Lymans sign is negative.  Spine, Ribs and Pelvis: Back pain. Inspection, percussion and palpation performed. Negative for misalignment, asymmetry, crepitation, defects, tenderness masses or effusions.  Left Lower Extremity: Inspection, percussion and palpation performed. Negative straight leg raise.  Right Lower Extremity: Inspection, percussion and palpation performed. Negative straight leg raise. SPINE EXAM:     Lumbar spine: No rash, ecchymosis, or gross obliquity. No focal atrophy is noted. ASSESSMENT    ICD-10-CM ICD-9-CM    1. HNP (herniated nucleus pulposus), lumbar M51.26 722.10    2. Lumbar radiculopathy M54.16 724.4    3. Nonspecific pain in the lumbar region M54.5 724.2 AMB POC XRAY, SPINE, LUMBOSACRAL; 4+       Written by Angelica Hartman, as dictated by Chloe Ross MD.    I, Dr. Chloe Ross MD, confirm that all documentation is accurate.

## 2019-07-23 NOTE — PROGRESS NOTES
Mitchell An presents today for   Chief Complaint   Patient presents with    Back Pain     block fu       Is someone accompanying this pt? NO     Is the patient using any DME equipment during OV? NO    Depression Screening:  3 most recent PHQ Screens 7/23/2019   Little interest or pleasure in doing things Not at all   Feeling down, depressed, irritable, or hopeless Not at all   Total Score PHQ 2 0       Learning Assessment:  Learning Assessment 7/23/2019   PRIMARY LEARNER Patient   PRIMARY LANGUAGE ENGLISH   LEARNER PREFERENCE PRIMARY READING   ANSWERED BY patient   RELATIONSHIP SELF         Coordination of Care:  1. Have you been to the ER, urgent care clinic since your last visit? NO  Hospitalized since your last visit? NO    2. Have you seen or consulted any other health care providers outside of the [address] [place] [address] since your last visit? NO Include any pap smears or colon screening.  NO

## 2019-07-24 ENCOUNTER — HOSPITAL ENCOUNTER (OUTPATIENT)
Dept: PHYSICAL THERAPY | Age: 39
Discharge: HOME | End: 2019-07-24
Payer: OTHER GOVERNMENT

## 2019-07-24 PROCEDURE — 97140 MANUAL THERAPY 1/> REGIONS: CPT

## 2019-07-24 PROCEDURE — 97110 THERAPEUTIC EXERCISES: CPT

## 2019-07-24 PROCEDURE — 97012 MECHANICAL TRACTION THERAPY: CPT

## 2019-07-24 NOTE — PROGRESS NOTES
PT DAILY TREATMENT NOTE     Patient Name: Jodie Hudson  Date:2019  : 1980  [x]  Patient  Verified  Payor: N[employer]NAEL / Plan: [facility] / Product Type:  /    In time:8:10  Out time:9:22  Total Treatment Time (min): 72  Total Timed Codes (min): 51  1:1 Treatment Time (min): 51   Visit #: 4 of     Treatment Area: Lower back pain [M54.5]    SUBJECTIVE  Pain Level (0-10 scale): 3  Any medication changes, allergies to medications, adverse drug reactions, diagnosis change, or new procedure performed?: [x] No    [] Yes (see summary sheet for update)  Subjective functional status/changes:   [] No changes reported  Pt 10 minutes late for Rx. Saw Dr Thomas Lowe and he notes that i'm improving more than he though. However I still have that numbness in the lateral leg near the knee and the top part of the foot. He's unsure when or if that will resolve.      OBJECTIVE  Modality rationale: decrease edema, decrease inflammation, decrease pain and increase tissue extensibility to improve the patients ability to ambulate, sit, ADLs    Min Type Additional Details    [] Estim: []Att   []Unatt        []TENS instruct                  []IFC  []Premod   []NMES                     []Other:  []w/US   []w/ice   []w/heat  Position:  Location:   11 [x]  Traction: [] Cervical       [x]Lumbar                       [] Prone          []Supine                       [x]Intermittent - 3 step   []Continuous Lbs: 100/50   [] before manual  [x] after manual    []  Ultrasound: []Continuous   [] Pulsed                           []1MHz   []3MHz Location:  W/cm2:    []  Iontophoresis with dexamethasone         Location: [] Take home patch   [] In clinic   10 [x]  Ice    After traction  Position: semireclined  Location: L/S   [x] Skin assessment post-treatment:  [x]intact []redness- no adverse reaction       []redness - adverse reaction:       31 min Therapeutic Exercise:  [x] See flow sheet :   Rationale: increase ROM, increase strength and improve coordination to improve the patients ability to sit, ADls, bed mobility      20 min Manual Therapy: Technique:      IMT NC for needle insertion time    Post needling ischemic compression   Manual hip flexor stretch R  - NI today   Manual HS stretch   SI check - WNL   Rationale:      decrease pain, increase ROM, increase tissue extensibility and decrease trigger points to improve patient's ability to ambulate, work, sit     Dry Needling Procedure Note    Dry Needle Session Number:  10    Procedure: An intramuscular manual therapy (dry needling) and a neuro-muscular re-education treatment was done to deactivate myofascial trigger points, with a 15/30 gauge solid filament needle, under aseptic technique. Indication(s): [] Muscle spasms [x] Myalgia/Myositis  [x] Muscle cramps      [x] Muscle imbalances [] TMD (TMJ) [] Myofascial pain & dysfunction     [] Other: __    Chart reviewed for the following:  IFrederick PT, have reviewed the medical history, summary list and precautions/contraindications for Steffany Higgins.     TIME OUT performed immediately prior to start of procedure:  8:38 (enter time the timeout was completed)  Frederick JONES PT, have performed the following reviews on Steffany Higgins prior to the start of the session:      [x] Patient was identified by name and date of birth    [x] Agreement on all muscles being treated was verified   [x] Purpose of dry needling, side effects, possible complications, risks and benefits were explained to the patient   [x] Procedure site(s) verified  [x] Patient was positioned for comfort and draped for privacy  [x] Informed Consent was signed (initial visit) and verified verbally (subsequent visits)  [x] Patient was instructed on the need to report the use of blood thinners and/or immunosuppressant medications  [x] How to respond to possible adverse effects of treatment  [x] Self treatment of post needling soreness: ice, heat (moist heat, heat wraps) and stretching  [x] Opportunity was given to ask any questions, all questions were answered            Treatment:  The following muscles were treated today:    Right: Lumbar paraspinals, multifidus    Left: LS paraspinal, multifidus, lateral > medial gastroc      Patients response to todays treatment:   [x]  LTRs  [x]  Muscle Relaxation  []  Pain Relief  []  Decreased Tinnitus  []  Decreased HAs [x]  Post needling soreness []  Increased ROM   []  Other:        x min HEP review -     Discussed benefits of NCS as needed in the future to assess n involvement. HS curls in siting and prone with tband at home      Other Objective/Functional Measures: TrP with LTR's in the lateral gastroc. TTP in the medial proximal gastroc - did not perform IMT. Cramping noted with bridges; Difficulty with HS curls in prone    Pain Level (0-10 scale) post treatment: 0    ASSESSMENT/Changes in Function: Pt cont' to have significant weakness in the HS mm that presents with limited bridiging due to pain and cramping. Progressing well with no c/o anterior hip pain today. MD notes good progress overall with function and results on imaging. Tissue tension noted in L > R lumbar multifidus, resolved with twist and hold technique with IMT. Patient will continue to benefit from skilled PT services to modify and progress therapeutic interventions, address functional mobility deficits, address ROM deficits, address strength deficits, analyze and address soft tissue restrictions, analyze and cue movement patterns, analyze and modify body mechanics/ergonomics, assess and modify postural abnormalities, address imbalance/dizziness and instruct in home and community integration to attain remaining goals.      []  See Plan of Care  []  See progress note/recertification  []  See Discharge Summary         Progress towards goals / Updated goals:  2.  Patient will demo negative seated slump with 5 deg DF AROM for improved neural gliding to decrease pain with LS flexion activities  Goal met - patient demo negative slump 7/17/19  3. Patient will demo 100% bridge with pain less than 3/10 for indication of improved core strength for decreased compression with sitting at work  Goal not met - bridge = 50% with pain (7/24/19)  4.  Patient will demo increased L ext owen strength to  4-/5 for decreased LS strength with car transfers  Goal not met at 3/5     PLAN  [x]  Upgrade activities as tolerated     []  Continue plan of care  []  Update interventions per flow sheet       []  Discharge due to:_  [x]  Other:_   Assess goals NV     Alisson Alejandro DPT 7/24/2019  6:49 AM

## 2019-07-25 NOTE — PROGRESS NOTES
PT DAILY TREATMENT NOTE     Patient Name: Prasad Ricketts  Date:2019  : 1980  [x]  Patient  Verified  Payor: NATANAEL / Plan: [facility] [place] 74 / Product Type:  /    In time:7:40  Out time:9:00  Total Treatment Time (min): 80  Total Timed Codes (min): 59  1:1 Treatment Time (min): 59   Visit #: 5 of     Treatment Area: Lower back pain [M54.5]    SUBJECTIVE  Pain Level (0-10 scale): 2  Any medication changes, allergies to medications, adverse drug reactions, diagnosis change, or new procedure performed?: [x] No    [] Yes (see summary sheet for update)  Subjective functional status/changes:   [] No changes reported  Pt 10 minutes late for treatment. The only spot that hurts is that one part on my calf. Not needling soreness. It's the weakest feeling in the morning when I first get up. The pain originated in the toe; the calf symptoms started February.         OBJECTIVE  Modality rationale: decrease edema, decrease inflammation and decrease pain to improve the patients ability to improve sitting, standing, mobility tolerance   Min Type Additional Details   11 [x]  Traction: [] Cervical       [x]Lumbar                       [] Prone          []Supine                       [x]Intermittent  3 step up  Lbs: 100/50   [] before manual  [x] after manual   10 [x]  Ice     - after traction  Position: semilreclined  Location: L/S,   [x] Skin assessment post-treatment:  [x]intact []redness- no adverse reaction       []redness - adverse reaction:       41 min Therapeutic Exercise:  [x] See flow sheet :   Rationale: increase ROM, increase strength and improve coordination to improve the patients ability to stand, walk, ADLs, work        18 min Manual Therapy: Technique:      IMT NC   Post needling ischemic compression     IASTM To L popliteus  K tape to inhibit gastroc and popliteus    Rationale:      decrease pain, increase ROM, increase tissue extensibility and decrease trigger points to improve patient's ability to work, sit, ambulate     Dry Needling Procedure Note    Dry Needle Session Number:  11    Procedure: An intramuscular manual therapy (dry needling) and a neuro-muscular re-education treatment was done to deactivate myofascial trigger points, with a 15/30 gauge solid filament needle, under aseptic technique. Indication(s): [] Muscle spasms [x] Myalgia/Myositis  [x] Muscle cramps      [x] Muscle imbalances [] TMD (TMJ) [] Myofascial pain & dysfunction     [] Other: __    Chart reviewed for the following:  Dale JONES, PT, have reviewed the medical history, summary list and precautions/contraindications for [employer].     TIME OUT performed immediately prior to start of procedure:  8:07 (enter time the timeout was completed)  Dale JONES, PT, have performed the following reviews on [employer] prior to the start of the session:      [x] Patient was identified by name and date of birth    [x] Agreement on all muscles being treated was verified   [x] Purpose of dry needling, side effects, possible complications, risks and benefits were explained to the patient   [x] Procedure site(s) verified  [x] Patient was positioned for comfort and draped for privacy  [x] Informed Consent was signed (initial visit) and verified verbally (subsequent visits)  [x] Patient was instructed on the need to report the use of blood thinners and/or immunosuppressant medications  [x] How to respond to possible adverse effects of treatment  [x] Self treatment of post needling soreness: ice, heat (moist heat, heat wraps) and stretching  [x] Opportunity was given to ask any questions, all questions were answered            Treatment:  The following muscles were treated today:    Right: L/S paraspinals   Left: Gastroc, HS, lumbar paraspinals, QL, lat     Patients response to todays treatment:   [x]  LTRs  [x]  Muscle Relaxation  []  Pain Relief  []  Decreased Tinnitus  []  Decreased HAs [x] Post needling soreness []  Increased ROM   []  Other:        x min Patient Education: [x] Review HEP    [x] Progressed/Changed HEP based on:  REIL with L shift and L shift correction at wall to abolish sxs. Discussed stop-light phenomenon of pain. Centralization and Periph. of pain       [] positioning   [] body mechanics   [] transfers   [] heat/ice application        Other Objective/Functional Measures:     LIZETH/REIL: NE on the calf pain. REIL with hips off centered to R/ L shift:  Min decrease in pain  L shift at wall: decrease c/o pain     Pain Level (0-10 scale) post treatment: 0    ASSESSMENT/Changes in Function: great progress with REIL and shift correction movements today. Patient will continue to benefit from skilled PT services to modify and progress therapeutic interventions, address functional mobility deficits, address ROM deficits, address strength deficits, analyze and address soft tissue restrictions, analyze and cue movement patterns, analyze and modify body mechanics/ergonomics, assess and modify postural abnormalities, address imbalance/dizziness and instruct in home and community integration to attain remaining goals. []  See Plan of Care  []  See progress note/recertification  []  See Discharge Summary         Progress towards goals / Updated goals:  2.  Patient will demo negative seated slump with 5 deg DF AROM for improved neural gliding to decrease pain with LS flexion activities  Goal met - patient demo negative slump 7/17/19  3. Patient will demo 100% bridge with pain less than 3/10 for indication of improved core strength for decreased compression with sitting at work [employer] not met - bridge = 50% with pain (7/24/19)  4.  Patient will demo increased L ext owen strength to  4-/5 for decreased LS strength with car transfers  Goal not met at 3/5     PLAN  [x]  Upgrade activities as tolerated     []  Continue plan of care  []  Update interventions per flow sheet       []  Discharge due to:_  [x]  Other:_  Due for reassessment on 8/8/19    Bladimir Pierre DPT 7/25/2019  4:22 PM

## 2019-07-26 ENCOUNTER — HOSPITAL ENCOUNTER (OUTPATIENT)
Dept: PHYSICAL THERAPY | Age: 39
Discharge: HOME | End: 2019-07-26
Payer: OTHER GOVERNMENT

## 2019-07-26 PROCEDURE — 97012 MECHANICAL TRACTION THERAPY: CPT

## 2019-07-26 PROCEDURE — 97140 MANUAL THERAPY 1/> REGIONS: CPT

## 2019-07-26 PROCEDURE — 97110 THERAPEUTIC EXERCISES: CPT

## 2019-07-29 ENCOUNTER — HOSPITAL ENCOUNTER (OUTPATIENT)
Dept: PHYSICAL THERAPY | Age: 39
Discharge: HOME | End: 2019-07-29
Payer: OTHER GOVERNMENT

## 2019-07-29 PROCEDURE — 97012 MECHANICAL TRACTION THERAPY: CPT

## 2019-07-29 PROCEDURE — 97140 MANUAL THERAPY 1/> REGIONS: CPT

## 2019-07-29 PROCEDURE — 97110 THERAPEUTIC EXERCISES: CPT

## 2019-07-29 NOTE — PROGRESS NOTES
PT DAILY TREATMENT NOTE     Patient Name: Alexey Marley  Date:2019  : 1980  [x]  Patient  Verified  Payor: NATANAEL / Plan: [facility] [place] 74 / Product Type:  /    In time:8:08  Out time:9:20  Total Treatment Time (min): 72  Total Timed Codes (min): 51  1:1 Treatment Time (min): 51   Visit #: 6 of     Treatment Area: Lower back pain [M54.5]    SUBJECTIVE  Pain Level (0-10 scale): 3  Any medication changes, allergies to medications, adverse drug reactions, diagnosis change, or new procedure performed?: [x] No    [] Yes (see summary sheet for update)  Subjective functional status/changes:   [] No changes reported  A little achy in the calf. I think I overdid the new HEP  And pulled something in my side back on the R. I think I stretched the mm too much. The R front of the hip/groin is tender. The lower back is not too bad. The back of the knee is dull which is super.        OBJECTIVE  Modality rationale: decrease edema, decrease inflammation and decrease pain to improve the patients ability to improve sitting, standing, mobility tolerance   Min Type Additional Details   11 [x]  Traction: [] Cervical       [x]Lumbar                       [] Prone          []Supine                       [x]Intermittent  3 step up  Lbs: 100/50   [] before manual  [x] after manual   10 [x]  Ice     - after traction  Position: semilreclined  Location: L/S,   [x] Skin assessment post-treatment:  [x]intact []redness- no adverse reaction       []redness - adverse reaction:       33 min Therapeutic Exercise:  [x] See flow sheet :   Rationale: increase ROM, increase strength and improve coordination to improve the patients ability to sit, stand, ADLs      18 min Manual Therapy: Technique:      IMT NC   Post needling ischemic compression    HS stretch, R hip flexor stretch,  R QL stretch    Rationale:      decrease pain, increase ROM, increase tissue extensibility and decrease trigger points to improve patient's ability to sit, ambulate, ADLs    Dry Needling Procedure Note    Dry Needle Session Number:  12    Procedure: An intramuscular manual therapy (dry needling) and a neuro-muscular re-education treatment was done to deactivate myofascial trigger points, with a 15/30 gauge solid filament needle, under aseptic technique. Indication(s): [] Muscle spasms [x] Myalgia/Myositis  [x] Muscle cramps      [x] Muscle imbalances [] TMD (TMJ) [] Myofascial pain & dysfunction     [] Other: __    Chart reviewed for the following:  Chuy JONES PT, have reviewed the medical history, summary list and precautions/contraindications for [employer].     TIME OUT performed immediately prior to start of procedure:  8:28 (enter time the timeout was completed)  Chuy JONES PT, have performed the following reviews on [employer] prior to the start of the session:      [x] Patient was identified by name and date of birth    [x] Agreement on all muscles being treated was verified   [x] Purpose of dry needling, side effects, possible complications, risks and benefits were explained to the patient   [x] Procedure site(s) verified  [x] Patient was positioned for comfort and draped for privacy  [x] Informed Consent was signed (initial visit) and verified verbally (subsequent visits)  [x] Patient was instructed on the need to report the use of blood thinners and/or immunosuppressant medications  [x] How to respond to possible adverse effects of treatment  [x] Self treatment of post needling soreness: ice, heat (moist heat, heat wraps) and stretching  [x] Opportunity was given to ask any questions, all questions were answered            Treatment:  The following muscles were treated today:    Right: QL, lats, lumbar paraspinals over Ribs 10-12; lumbar multifidus   Left: HS, lumbar mulritifus      Patients response to todays treatment:   [x]  LTRs  [x]  Muscle Relaxation  [x]  Pain Relief  []  Decreased Tinnitus  []  Decreased HAs []  Post needling soreness [x]  Increased ROM   []  Other:        x min Patient Education: [x] Review HEP    [] Progressed/Changed HEP based on:   [] positioning   [] body mechanics   [] transfers   [] heat/ice application        Other Objective/Functional Measures:   HS stretch with strap: difficulty with TKE on the (L) due to posterior knee tightness  PROM abduction 35 deg    Pain Level (0-10 scale) post treatment: 6    ASSESSMENT/Changes in Function: good response to shift  Correction with a decrease in radicular sxs. Resolved R flank pain with IMT today. Minimal TTP in the HS today but con't to get cramping with activation during bridging.  ,    Patient will continue to benefit from skilled PT services to modify and progress therapeutic interventions, address functional mobility deficits, address ROM deficits, address strength deficits, analyze and address soft tissue restrictions, analyze and cue movement patterns, analyze and modify body mechanics/ergonomics, assess and modify postural abnormalities and instruct in home and community integration to attain remaining goals. []  See Plan of Care  []  See progress note/recertification  []  See Discharge Summary         Progress towards goals / Updated goals:  2.  Patient will demo negative seated slump with 5 deg DF AROM for improved neural gliding to decrease pain with LS flexion activities  Goal met - patient demo negative slump 7/17/19  3. Patient will demo 100% bridge with pain less than 3/10 for indication of improved core strength for decreased compression with sitting at work [employer] not met - bridge = 50% with pain and cramping (7/29/19)  4.  Patient will demo increased L ext owen strength to  4-/5 for decreased LS strength with car transfers  Goal not met at 3/5     PLAN  [x]  Upgrade activities as tolerated     []  Continue plan of care  []  Update interventions per flow sheet       []  Discharge due to:_  [x]  Other:_ Reassessment due by 8/8/19    Julissa Edwards DPT 7/29/2019  6:46 AM

## 2019-07-31 ENCOUNTER — HOSPITAL ENCOUNTER (OUTPATIENT)
Dept: PHYSICAL THERAPY | Age: 39
Discharge: HOME | End: 2019-07-31
Payer: OTHER GOVERNMENT

## 2019-07-31 PROCEDURE — 97110 THERAPEUTIC EXERCISES: CPT

## 2019-07-31 PROCEDURE — 97140 MANUAL THERAPY 1/> REGIONS: CPT

## 2019-07-31 NOTE — PROGRESS NOTES
PT DAILY TREATMENT NOTE     Patient Name: Rose Falguni  Date:2019  : 1980  [x]  Patient  Verified  Payor: NATANAEL / Plan: Regina Guerra / Product Type: [facility] [place] /    In time:741 Out time:831  Total Treatment Time (min): 50    Visit #: 7 of     Treatment Area: Lower back pain [M54.5]    SUBJECTIVE  Pain Level (0-10 scale): 5  Any medication changes, allergies to medications, adverse drug reactions, diagnosis change, or new procedure performed?: [x] No    [] Yes (see summary sheet for update)  Subjective functional status/changes:   [] No changes reported  \"I tweaked it just getting into the car this morning. \"      OBJECTIVE  Modality rationale: Decrease m tightness sec to car transfer   Min Type Additional Details   H today [x]  Traction: [] Cervical       [x]Lumbar                       [] Prone          []Supine                       [x]Intermittent  3 step up  Lbs: 100/50   [] before manual  [x] after manual   10 []  Ice     - after traction - NT  MHP 10 min   Position: semilreclined  Location: L/S,   [x] Skin assessment post-treatment:  [x]intact []redness- no adverse reaction       []redness - adverse reaction:       30 min Therapeutic Exercise:  [x] See flow sheet :assess while in LIZETH    Rationale: increase ROM, increase strength and improve coordination to improve the patients ability to sit, stand, ADLs      10 min Manual Therapy: Technique:      IMT NC - <1 min sec to intolerance today    Post needling ischemic compression   IASTM - edge tool to LS paraspinals   Grade 1-2 LS PA mobs    Rationale:      decrease pain, increase ROM, increase tissue extensibility and decrease trigger points to improve patient's ability to sit, ambulate, ADLs    Dry Needling Procedure Note    Dry Needle Session Number:  13    Procedure:    An intramuscular manual therapy (dry needling) and a neuro-muscular re-education treatment was done to deactivate myofascial trigger points, with a 15 gauge solid filament needle, under aseptic technique. Indication(s): [] Muscle spasms [x] Myalgia/Myositis  [x] Muscle cramps      [x] Muscle imbalances [] TMD (TMJ) [] Myofascial pain & dysfunction     [] Other: __    Chart reviewed for the following:  Melva JONES PT, have reviewed the medical history, summary list and precautions/contraindications for [employer].     TIME OUT performed immediately prior to start of procedure:  172 (enter time the timeout was completed)  Melva JONES PT, have performed the following reviews on [employer] prior to the start of the session:      [x] Patient was identified by name and date of birth    [x] Agreement on all muscles being treated was verified   [x] Purpose of dry needling, side effects, possible complications, risks and benefits were explained to the patient   [x] Procedure site(s) verified  [x] Patient was positioned for comfort and draped for privacy  [x] Informed Consent was signed (initial visit) and verified verbally (subsequent visits)  [x] Patient was instructed on the need to report the use of blood thinners and/or immunosuppressant medications  [x] How to respond to possible adverse effects of treatment  [x] Self treatment of post needling soreness: ice, heat (moist heat, heat wraps) and stretching  [x] Opportunity was given to ask any questions, all questions were answered            Treatment:  The following muscles were treated today:    Right: L4 parapsinal   Left: L 4 paraspinal     Patients response to todays treatment:   [x]  LTRs  [x]  Muscle Relaxation  [x]  Pain Relief  []  Decreased Tinnitus  []  Decreased HAs []  Post needling soreness [x]  Increased ROM   []  Other:        x min Patient Education: [x] Review HEP         Other Objective/Functional Measures:   HEP compliance  - \"Its helping\" -patient reports the calf pain is better but not alleviated     Pain Level (0-10 scale) post treatment: 3    ASSESSMENT/Changes in Function: Patient presents with increased pain today sec to pain with twisting while getting into the car. . Encouraged to schedule 2 more apts next week before reassessment then discussed decrease freq to 1x per week sec to work. Upon needling, patient reports severe burning and IMT was DC'd for today. Focused manual on IASTM and gentle PA mobs for pain relief. Held standing core therex sec to increased pain levels     Patient will continue to benefit from skilled PT services to modify and progress therapeutic interventions, address functional mobility deficits, address ROM deficits, address strength deficits, analyze and address soft tissue restrictions, analyze and cue movement patterns, analyze and modify body mechanics/ergonomics, assess and modify postural abnormalities and instruct in home and community integration to attain remaining goals. []  See Plan of Care  [x]  See progress note/recertification 8/0/37  []  See Discharge Summary         Progress towards goals / Updated goals: All goals reviewed and progressing appropriately as of 7/31/2019  2.  Patient will demo negative seated slump with 5 deg DF AROM for improved neural gliding to decrease pain with LS flexion activities  Goal met - patient demo negative slump 7/17/19  3. Patient will demo 100% bridge with pain less than 3/10 for indication of improved core strength for decreased compression with sitting at work [employer] not met - bridge = 50% with pain and cramping (7/29/19)  4.  Patient will demo increased L ext owen strength to  4-/5 for decreased LS strength with car transfers  Goal not met at 3/5 7/31/19    PLAN  [x]  Upgrade activities as tolerated     []  Continue plan of care  []  Update interventions per flow sheet       []  Discharge due to:_  [x]  Other:_  Reassessment due by 8/8/19 - patient to scheduled 2 apts for next week to finish current POC , then dec freq to 1x per week  Resume traction as able Roque Gonzalez DPT  7/31/2019

## 2019-08-05 ENCOUNTER — HOSPITAL ENCOUNTER (OUTPATIENT)
Dept: PHYSICAL THERAPY | Age: 39
Discharge: HOME | End: 2019-08-05
Payer: OTHER GOVERNMENT

## 2019-08-05 PROCEDURE — 97110 THERAPEUTIC EXERCISES: CPT

## 2019-08-05 PROCEDURE — 97012 MECHANICAL TRACTION THERAPY: CPT

## 2019-08-05 PROCEDURE — 97140 MANUAL THERAPY 1/> REGIONS: CPT

## 2019-08-05 NOTE — PROGRESS NOTES
PT DAILY TREATMENT NOTE     Patient Name: Melinda Angulo  Date:2019  : 1980  [x]  Patient  Verified  Payor:  / Plan: [facility]  [place] / Product Type:  /    In time:808 Out time:923  Total Treatment Time (min): 75  Visit #: 8 of     Treatment Area: Lower back pain [M54.5]    SUBJECTIVE  Pain Level (0-10 scale): 5  Any medication changes, allergies to medications, adverse drug reactions, diagnosis change, or new procedure performed?: [x] No    [] Yes (see summary sheet for update)  Subjective functional status/changes:   [] No changes reported  Patient reports soreness       OBJECTIVE  Modality rationale: Decrease m tightness sec to car transfer   Min Type Additional Details   15 [x]  Traction: [] Cervical       [x]Lumbar                       [] Prone          []Supine                       [x]Intermittent  3 step up  Lbs: 100/50   [] before manual  [x] after manual   10 [x]  Ice     - after traction - NT   Position: semilreclined  Location: L/S,   [x] Skin assessment post-treatment:  [x]intact []redness- no adverse reaction       []redness - adverse reaction:       37 min Therapeutic Exercise:  [x] See flow sheet :assess while in LIZETH , REASSESS FOR PN    Rationale: increase ROM, increase strength and improve coordination to improve the patients ability to sit, stand, ADLs      13 min Manual Therapy: Technique:      IMT NC   Post needling ischemic compression   Roller to L HS- STM   Grade 1-2 LS PA mobs     Rationale:      decrease pain, increase ROM, increase tissue extensibility and decrease trigger points to improve patient's ability to sit, ambulate, ADLs    Dry Needling Procedure Note    Dry Needle Session Number:  14    Procedure: An intramuscular manual therapy (dry needling) and a neuro-muscular re-education treatment was done to deactivate myofascial trigger points, with a 15/30 gauge solid filament needle, under aseptic technique.     Indication(s): [] Muscle spasms [x] Myalgia/Myositis  [x] Muscle cramps      [x] Muscle imbalances [] TMD (TMJ) [] Myofascial pain & dysfunction     [] Other: __    Chart reviewed for the following:  Sangita JONES PT, have reviewed the medical history, summary list and precautions/contraindications for [employer]. TIME OUT performed immediately prior to start of procedure:  810am (enter time the timeout was completed)  Sangita JONES PT, have performed the following reviews on [employer] prior to the start of the session:      [x] Patient was identified by name and date of birth    [x] Agreement on all muscles being treated was verified   [x] Purpose of dry needling, side effects, possible complications, risks and benefits were explained to the patient   [x] Procedure site(s) verified  [x] Patient was positioned for comfort and draped for privacy  [x] Informed Consent was signed (initial visit) and verified verbally (subsequent visits)  [x] Patient was instructed on the need to report the use of blood thinners and/or immunosuppressant medications  [x] How to respond to possible adverse effects of treatment  [x] Self treatment of post needling soreness: ice, heat (moist heat, heat wraps) and stretching  [x] Opportunity was given to ask any questions, all questions were answered            Treatment:  The following muscles were treated today:    Right: LS parapsinal   Left: HS with [facility]      Patients response to todays treatment:   [x]  LTRs  [x]  Muscle Relaxation  [x]  Pain Relief  []  Decreased Tinnitus  []  Decreased HAs []  Post needling soreness [x]  Increased ROM   []  Other:        x min Patient Education: [x] Review HEP         Other Objective/Functional Measures:   Pain at best 1/10, at worst 6/10  Subjective % improvement 75%  Objective:                  LS AROM flexion :  WNL - patient did no need UE to stand but required VCing for TA contraction                  Core/ bridge dec 50% Slump (-)                  Hip strength: flexion 4+/5, ABD L 4, R 4 Extension R 3+, L 3   Improvements: overall improved mobility, driving, sitting at work   Deficits bending activities such as sweeping, vacuuming and picking up laundry, standing tolerance: 20-30 min , sitting tolerance at work 3-4 hours    Pain Level (0-10 scale) post treatment: 3    ASSESSMENT/Changes in Function: SEE PN     Patient will continue to benefit from skilled PT services to modify and progress therapeutic interventions, address functional mobility deficits, address ROM deficits, address strength deficits, analyze and address soft tissue restrictions, analyze and cue movement patterns, analyze and modify body mechanics/ergonomics, assess and modify postural abnormalities and instruct in home and community integration to attain remaining goals. []  See Plan of Care  [x]  See progress note/recertification 8/6/30  []  See Discharge Summary         Progress towards goals / Updated goals: All goals reviewed and progressing appropriately as of 8/5/2019  2.  Patient will demo negative seated slump with 5 deg DF AROM for improved neural gliding to decrease pain with LS flexion activities  Goal met - patient demo negative slump   3. Patient will demo 100% bridge with pain less than 3/10 for indication of improved core strength for decreased compression with sitting at work [employer] not met , bridge = 50% - limited progress   4.  Patient will demo increased L ext owen strength to  4-/5 for decreased LS strength with car transfers  Goal not met at 3/5 with co cramping     PLAN  [x]  Upgrade activities as tolerated     []  Continue plan of care  []  Update interventions per flow sheet       []  Discharge due to:_  [x]  Other:_  Cont per PN     Esteban Velasquez DPT  8/5/2019

## 2019-08-05 NOTE — PROGRESS NOTES
[address] [facility] [address] [facility] AT [address]  Phone: [phone] Fax: [phone]  PROGRESS NOTE  Patient Name: Andry Rodas : 1980   Treatment/Medical Diagnosis: Lower back pain [M54.5]   Referral Source: Cisco Marcum MD     Date of Initial Visit: 19 Attended Visits: 16 Missed Visits: 0     SUMMARY OF TREATMENT    Pt is a 45 y. o. year old female who presents with co LS pain with L LE radiculopathy and tightness/muscle spasms and numbness of posterior chain starting with insidious onset late April. Hx LS discectomy in 2017. Treatment has included IMT, therex and traction. CURRENT STATUS  Patient cont to make slow steady gains with PT. Most deficits at this time associated with poor core control and hx of HNP. Assessment as follows:  Pain at best 1/10, at worst 6/10  Subjective % improvement 75%  Objective:                  LS AROM flexion : WNL - patient did no need UE to stand but required VCing for TA contraction                  Core/ bridge dec 50%                 Slump (-)                  Hip strength: flexion 4+/5, ABD L 4, R 4 Extension R 3+, L 3   Improvements: overall improved mobility, driving, sitting at work   Deficits bending activities such as sweeping, vacuuming and picking up laundry, standing tolerance: 20-30 min , sitting tolerance at work 3-4 hours           Progress towards goals / Updated goals:   2.  Patient will demo negative seated slump with 5 deg DF AROM for improved neural gliding to decrease pain with LS flexion activities  Goal met - patient demo negative slump   3. Patient will demo 100% bridge with pain less than 3/10 for indication of improved core strength for decreased compression with sitting at work [employer] not met , bridge = 50% - limited progress   4.  Patient will demo increased L ext owen strength to  4-/5 for decreased LS strength with car transfers  Goal not met at 3/5 with co cramping     New Goals to be achieved in __4-8_  treatments:  1. Patient will demo 100% bridge with pain less than 3/10 for indication of improved core strength for decreased compression with sitting at work   2. Patient will demo increased L ext owen strength to  4-/5 for decreased LS strength with car transfers  3. Patient will reports 5 hour work tolerance before increase in pain for progression of ease with work duties     RECOMMENDATIONS  Patient would benefit from continuation of skilled PT to address above deficits and progress to increased activity tolerance. Cont 1-2 x 4-8 sessions as needed. If you have any questions/comments please contact us directly at [phone]   Thank you for allowing us to assist in the care of your patient. Therapist Signature: Hardin Dancer, PT Date: 8/5/2019     Time: 908am    NOTE TO PHYSICIAN:  PLEASE COMPLETE THE ORDERS BELOW AND FAX TO   [facility]: [phone]. If you are unable to process this request in 24 hours please contact our office: [phone].  ___ I have read the above report and request that my patient continue as recommended.   ___ I have read the above report and request that my patient continue therapy with the following changes/special instructions:_________________________________________________________   ___ I have read the above report and request that my patient be discharged from therapy.      Physician Signature:        Date:       Time:

## 2019-08-09 ENCOUNTER — HOSPITAL ENCOUNTER (OUTPATIENT)
Dept: PHYSICAL THERAPY | Age: 39
Discharge: HOME | End: 2019-08-09
Payer: OTHER GOVERNMENT

## 2019-08-09 PROCEDURE — 97110 THERAPEUTIC EXERCISES: CPT

## 2019-08-09 PROCEDURE — 97012 MECHANICAL TRACTION THERAPY: CPT

## 2019-08-09 PROCEDURE — 97140 MANUAL THERAPY 1/> REGIONS: CPT

## 2019-08-09 NOTE — PROGRESS NOTES
PT DAILY TREATMENT NOTE     Patient Name: Chris Hebert  Date:2019  : 1980  [x]  Patient  Verified  [facility] REGION / Product Type:  /    In time:809 Out time:920  Total Treatment Time (min): 71  Visit #: 1 of -    Treatment Area: Lower back pain [M54.5]    SUBJECTIVE  Pain Level (0-10 scale): 3  Any medication changes, allergies to medications, adverse drug reactions, diagnosis change, or new procedure performed?: [x] No    [] Yes (see summary sheet for update)  Subjective functional status/changes:   [] No changes reported  Patient reports stiffness but overall not too painful      OBJECTIVE  Modality rationale: Decrease m tightness sec to car transfer   Min Type Additional Details   15 [x]  Traction: [] Cervical       [x]Lumbar                       [] Prone          []Supine                       [x]Intermittent  3 step up  Lbs: 100/50   [] before manual  [x] after manual   10 [x]  Ice     - after traction - NT   Position: semilreclined  Location: L/S,   [x] Skin assessment post-treatment:  [x]intact []redness- no adverse reaction       []redness - adverse reaction:       30 min Therapeutic Exercise:  [x] See flow sheet :   Rationale: increase ROM, increase strength and improve coordination to improve the patients ability to sit, stand, ADLs      16 min Manual Therapy: Technique:      IMT NC   Post needling ischemic compression   LS PA mobs grade 2-3  SI check - R ant rotation noted  MET and shot gun, then SI recheck    Rationale:      decrease pain, increase ROM, increase tissue extensibility and decrease trigger points to improve patient's ability to sit, ambulate, ADLs    Dry Needling Procedure Note    Dry Needle Session Number:  15    Procedure:    An intramuscular manual therapy (dry needling) and a neuro-muscular re-education treatment was done to deactivate myofascial trigger points, with a 15/30 gauge solid filament needle, under aseptic technique. Indication(s): [] Muscle spasms [x] Myalgia/Myositis  [x] Muscle cramps      [x] Muscle imbalances [] TMD (TMJ) [] Myofascial pain & dysfunction     [] Other: __    Chart reviewed for the following:  Elo JONES PT, have reviewed the medical history, summary list and precautions/contraindications for [employer].     TIME OUT performed immediately prior to start of procedure:  809a(enter time the timeout was completed)  Elo JONES PT, have performed the following reviews on [employer] prior to the start of the session:      [x] Patient was identified by name and date of birth    [x] Agreement on all muscles being treated was verified   [x] Purpose of dry needling, side effects, possible complications, risks and benefits were explained to the patient   [x] Procedure site(s) verified  [x] Patient was positioned for comfort and draped for privacy  [x] Informed Consent was signed (initial visit) and verified verbally (subsequent visits)  [x] Patient was instructed on the need to report the use of blood thinners and/or immunosuppressant medications  [x] How to respond to possible adverse effects of treatment  [x] Self treatment of post needling soreness: ice, heat (moist heat, heat wraps) and stretching  [x] Opportunity was given to ask any questions, all questions were answered            Treatment:  The following muscles were treated today:    Right: QL   Left: Proximal HS / distal glut max      Patients response to todays treatment:   [x]  LTRs  [x]  Muscle Relaxation  [x]  Pain Relief  []  Decreased Tinnitus  []  Decreased HAs []  Post needling soreness [x]  Increased ROM   []  Other:        x min Patient Education: [x] Review HEP         Other Objective/Functional Measures:    PRE progression : knee plank    + SI rotation     Pain Level (0-10 scale) post treatment: 2    ASSESSMENT/Changes in Function: Patient reports with decreased pain levels and only co stiffness today.  Tolerated PRE progression for core strength well with ability to maintain flat back in the modified knee position. Patient present today with + SI rotation corrected 100% with MET and cavitation with shot gun. Educated patient on self correction while at work in seated position to alleviate pain. Patient also able to demo small bridging motions for LS traction set up without exacerbation of pain. Patient will continue to benefit from skilled PT services to modify and progress therapeutic interventions, address functional mobility deficits, address ROM deficits, address strength deficits, analyze and address soft tissue restrictions, analyze and cue movement patterns, analyze and modify body mechanics/ergonomics, assess and modify postural abnormalities and instruct in home and community integration to attain remaining goals. []  See Plan of Care  [x]  See progress note/recertification 2/8/28  []  See Discharge Summary         Progress towards goals / Updated goals:  PN complete last session - cont per updated goals 8/9/19  New Goals to be achieved in __4-8_  treatments:  1. Patient will demo 100% bridge with pain less than 3/10 for indication of improved core strength for decreased compression with sitting at work   2. Patient will demo increased L ext owen strength to  4-/5 for decreased LS strength with car transfers  3.  Patient will reports 5 hour work tolerance before increase in pain for progression of ease with work duties     PLAN  [x]  Upgrade activities as tolerated     []  Continue plan of care  []  Update interventions per flow sheet       []  Discharge due to:_  [x]  Other:_  Patient to schedule 1x4 per PN     Job Boudreaux DPT  8/9/2019

## 2019-08-16 ENCOUNTER — HOSPITAL ENCOUNTER (OUTPATIENT)
Dept: PHYSICAL THERAPY | Age: 39
End: 2019-08-16
Payer: OTHER GOVERNMENT

## 2019-08-23 ENCOUNTER — HOSPITAL ENCOUNTER (OUTPATIENT)
Dept: PHYSICAL THERAPY | Age: 39
Discharge: HOME | End: 2019-08-23
Payer: OTHER GOVERNMENT

## 2019-08-23 PROCEDURE — 97140 MANUAL THERAPY 1/> REGIONS: CPT

## 2019-08-23 PROCEDURE — 97110 THERAPEUTIC EXERCISES: CPT

## 2019-08-23 PROCEDURE — 97012 MECHANICAL TRACTION THERAPY: CPT

## 2019-08-23 NOTE — PROGRESS NOTES
PT DAILY TREATMENT NOTE     Patient Name: Gladys Brown  Date:2019  : 1980  [x]  Patient  Verified  Payor:  / Plan: [facility] [place] [facility] / Product Type:  /    In time:737 Out time:847  Total Treatment Time (min): 70  Visit #: 2 of 4-8    Treatment Area: Lower back pain [M54.5]    SUBJECTIVE  Pain Level (0-10 scale): 4  Any medication changes, allergies to medications, adverse drug reactions, diagnosis change, or new procedure performed?: [x] No    [] Yes (see summary sheet for update)  Subjective functional status/changes:   [] No changes reported  Patient reports pain in the L calf and L buttock   Patient arrived 7 min late to apt      OBJECTIVE  Modality rationale: Decrease m tightness sec to car transfer   Min Type Additional Details   15 [x]  Traction: [] Cervical       [x]Lumbar                       [] Prone          []Supine                       [x]Intermittent  3 step up  Lbs: 100/50   [] before manual  [x] after manual   10 [x]  Ice     - after traction - NT   Position: semilreclined  Location: L/S,   [x] Skin assessment post-treatment:  [x]intact []redness- no adverse reaction       []redness - adverse reaction:       30 min Therapeutic Exercise:  [x] See flow sheet :   Rationale: increase ROM, increase strength and improve coordination to improve the patients ability to sit, stand, ADLs      15 min Manual Therapy: Technique:      IMT NC   Post needling ischemic compression   SI check - symm  Roller to L posterior leg    Rationale:      decrease pain, increase ROM, increase tissue extensibility and decrease trigger points to improve patient's ability to sit, ambulate, ADLs    Dry Needling Procedure Note    Dry Needle Session Number:  16    Procedure:    An intramuscular manual therapy (dry needling) and a neuro-muscular re-education treatment was done to deactivate myofascial trigger points, with a 15/30 gauge solid filament needle, under aseptic technique. Indication(s): [] Muscle spasms [x] Myalgia/Myositis  [x] Muscle cramps      [x] Muscle imbalances [] TMD (TMJ) [] Myofascial pain & dysfunction     [] Other: __    Chart reviewed for the following:  I, Tennis Miss, PT, have reviewed the medical history, summary list and precautions/contraindications for [employer].     TIME OUT performed immediately prior to start of procedure:  737am  (enter time the timeout was completed)  I, Tennis Miss, PT, have performed the following reviews on [employer] prior to the start of the session:      [x] Patient was identified by name and date of birth    [x] Agreement on all muscles being treated was verified   [x] Purpose of dry needling, side effects, possible complications, risks and benefits were explained to the patient   [x] Procedure site(s) verified  [x] Patient was positioned for comfort and draped for privacy  [x] Informed Consent was signed (initial visit) and verified verbally (subsequent visits)  [x] Patient was instructed on the need to report the use of blood thinners and/or immunosuppressant medications  [x] How to respond to possible adverse effects of treatment  [x] Self treatment of post needling soreness: ice, heat (moist heat, heat wraps) and stretching  [x] Opportunity was given to ask any questions, all questions were answered            Treatment:  The following muscles were treated today:    Right:    Left: Proximal HS / distal glut max  L calf      Patients response to todays treatment:   [x]  LTRs  [x]  Muscle Relaxation  [x]  Pain Relief  []  Decreased Tinnitus  []  Decreased HAs []  Post needling soreness [x]  Increased ROM   []  Other:        x min Patient Education: [x] Review HEP         Other Objective/Functional Measures:    Self correction education    Bridge - 50%    Pain Level (0-10 scale) post treatment: 3    ASSESSMENT/Changes in Function: Patient not seen since 8/9 sec to cancel last week at 1x per week freq. Patient presents today with main co pain in lateral calf and proximal HS. Good LTR with needling today     Patient will continue to benefit from skilled PT services to modify and progress therapeutic interventions, address functional mobility deficits, address ROM deficits, address strength deficits, analyze and address soft tissue restrictions, analyze and cue movement patterns, analyze and modify body mechanics/ergonomics, assess and modify postural abnormalities and instruct in home and community integration to attain remaining goals. []  See Plan of Care  [x]  See progress note/recertification 9/5/11  []  See Discharge Summary         Progress towards goals / Updated goals: All goals reviewed and progressing appropriately as of 8/23/2019  New Goals to be achieved in __4-8_  treatments:  1. Patient will demo 100% bridge with pain less than 3/10 for indication of improved core strength for decreased compression with sitting at work - goal progressing 75% wth co HS spasming 8/23/19  2. Patient will demo increased L ext strength to  4-/5 for decreased LS strength with car transfers  3.  Patient will reports 5 hour work tolerance before increase in pain for progression of ease with work duties     PLAN  [x]  Upgrade activities as tolerated     []  Continue plan of care  []  Update interventions per flow sheet       []  Discharge due to:_  [x]  Other:_add 3 way calf stretch GRETCHEN Mcclendon DPT  8/23/2019

## 2019-08-28 ENCOUNTER — HOSPITAL ENCOUNTER (OUTPATIENT)
Dept: PHYSICAL THERAPY | Age: 39
Discharge: HOME | End: 2019-08-28
Payer: OTHER GOVERNMENT

## 2019-08-28 PROCEDURE — 97012 MECHANICAL TRACTION THERAPY: CPT

## 2019-08-28 PROCEDURE — 97110 THERAPEUTIC EXERCISES: CPT

## 2019-08-28 PROCEDURE — 97140 MANUAL THERAPY 1/> REGIONS: CPT

## 2019-08-28 NOTE — PROGRESS NOTES
PT DAILY TREATMENT NOTE     Patient Name: Chris Hebert  Date:2019  : 1980  [x]  Patient  Verified  Payor:  / Plan: [facility]  [facility] REGION / Product Type:  /    In time:810 Out time:925  Total Treatment Time (min): 75  Visit #: 3 of -8    Treatment Area: Lower back pain [M54.5]    SUBJECTIVE  Pain Level (0-10 scale): 4 \"stiffness and achy\"  Any medication changes, allergies to medications, adverse drug reactions, diagnosis change, or new procedure performed?: [x] No    [] Yes (see summary sheet for update)  Subjective functional status/changes:   [] No changes reported  Patient reports running late sec to geese in the road. OBJECTIVE  Modality rationale: Decrease m tightness sec to car transfer   Min Type Additional Details   15 [x]  Traction: [] Cervical       [x]Lumbar                       [] Prone          []Supine                       [x]Intermittent  3 step up  Lbs: 100/50   [] before manual  [x] after manual   10 [x]  Ice     - after traction - NT   Position: semilreclined  Location: L/S,   [x] Skin assessment post-treatment:  [x]intact []redness- no adverse reaction       []redness - adverse reaction:       40 min Therapeutic Exercise:  [x] See flow sheet : 2 units charged    Rationale: increase ROM, increase strength and improve coordination to improve the patients ability to sit, stand, ADLs      15 min Manual Therapy: Technique:      IMT NC   Post needling ischemic compression   SI check - symm  Roller to L posterior leg    Rationale:      decrease pain, increase ROM, increase tissue extensibility and decrease trigger points to improve patient's ability to sit, ambulate, ADLs    Dry Needling Procedure Note    Dry Needle Session Number:  17    Procedure:    An intramuscular manual therapy (dry needling) and a neuro-muscular re-education treatment was done to deactivate myofascial trigger points, with a 15/30 gauge solid filament needle, under aseptic technique. Indication(s): [] Muscle spasms [x] Myalgia/Myositis  [x] Muscle cramps      [x] Muscle imbalances [] TMD (TMJ) [] Myofascial pain & dysfunction     [] Other: __    Chart reviewed for the following:  I, Hardin Dancer, PT, have reviewed the medical history, summary list and precautions/contraindications for [employer].     TIME OUT performed immediately prior to start of procedure:  810am  (enter time the timeout was completed)  I, Hardin Dancer, PT, have performed the following reviews on [employer] prior to the start of the session:      [x] Patient was identified by name and date of birth    [x] Agreement on all muscles being treated was verified   [x] Purpose of dry needling, side effects, possible complications, risks and benefits were explained to the patient   [x] Procedure site(s) verified  [x] Patient was positioned for comfort and draped for privacy  [x] Informed Consent was signed (initial visit) and verified verbally (subsequent visits)  [x] Patient was instructed on the need to report the use of blood thinners and/or immunosuppressant medications  [x] How to respond to possible adverse effects of treatment  [x] Self treatment of post needling soreness: ice, heat (moist heat, heat wraps) and stretching  [x] Opportunity was given to ask any questions, all questions were answered            Treatment:  The following muscles were treated today:    Right:    Left: Proximal adductor ortiz , proximal lateral head of gastroc      Patients response to todays treatment:   [x]  LTRs  [x]  Muscle Relaxation  [x]  Pain Relief  []  Decreased Tinnitus  []  Decreased HAs []  Post needling soreness [x]  Increased ROM   []  Other:        x min Patient Education: [x] Review HEP         Other Objective/Functional Measures:    Flexibility progression - 3 way incline board    Pain Level (0-10 scale) post treatment: 1    ASSESSMENT/Changes in Function: Patient co medial hip add pain and tightness addressed with MT today . Patient continues to report over improved function and decreased pain with IMT and traction treatment protocol. Patient continues with chiro treatment. Good tolerance to incline stretch. Noted B knee recurvatum during stretch. Patient will continue to benefit from skilled PT services to modify and progress therapeutic interventions, address functional mobility deficits, address ROM deficits, address strength deficits, analyze and address soft tissue restrictions, analyze and cue movement patterns, analyze and modify body mechanics/ergonomics, assess and modify postural abnormalities and instruct in home and community integration to attain remaining goals. []  See Plan of Care  [x]  See progress note/recertification 3/3/55  []  See Discharge Summary         Progress towards goals / Updated goals: All goals reviewed and progressing appropriately as of 8/28/2019  New Goals to be achieved in __4-8_  treatments:  1. Patient will demo 100% bridge with pain less than 3/10 for indication of improved core strength for decreased compression with sitting at work - goal progressing 75% wth co HS spasming 8/23/19  2. Patient will demo increased L ext strength to  4-/5 for decreased LS strength with car transfers  3.  Patient will reports 5 hour work tolerance before increase in pain for progression of ease with work duties goal progressing well - patient reports improved tolerance to work duties 8/28/19    PLAN  [x]  Upgrade activities as tolerated     []  Continue plan of care  []  Update interventions per flow sheet       []  Discharge due to:_  [x]  Other:_patient has 2 more apts scheduled     Andreea Brandt DPT  8/28/2019

## 2019-09-04 ENCOUNTER — HOSPITAL ENCOUNTER (OUTPATIENT)
Dept: PHYSICAL THERAPY | Age: 39
Discharge: HOME | End: 2019-09-04
Payer: OTHER GOVERNMENT

## 2019-09-04 PROCEDURE — 97110 THERAPEUTIC EXERCISES: CPT

## 2019-09-04 PROCEDURE — 97012 MECHANICAL TRACTION THERAPY: CPT

## 2019-09-04 PROCEDURE — 97140 MANUAL THERAPY 1/> REGIONS: CPT

## 2019-09-04 NOTE — PROGRESS NOTES
[address] [facility] [address] [facility] AT [address] [place]  Phone: [phone] Fax: [phone]  PROGRESS NOTE  Patient Name: Gladys Brown : 1980   Treatment/Medical Diagnosis: Lower back pain [M54.5]   Referral Source: Cesia Saldana MD     Date of Initial Visit: 19 Attended Visits: 19 Missed Visits: 1     SUMMARY OF TREATMENT    Pt is a 45 y. o. year old female who presents with co LS pain with L LE radiculopathy and tightness/muscle spasms and numbness of posterior chain starting with insidious onset late April. Hx LS discectomy in 2017. Treatment has included IMT, therex and traction. CURRENT STATUS  Patient progressing slowly with PT for dry needling, therex and traction. HEP compliance is intermittent and questionable at times. Other assessment as follows; Pain at best 2/10, at worst 7-8/10  Subjective % improvement 80%  Objective:                  LS AROM : WFL                  Core/ bridge 60%                 Hip strength: flexion 4+/5, ABD L 4, R 4 Extension R 3+, L 3    Improvements: sitting at work: 6 hours, overall improved mobility, driving,    Deficits stairs, walking tolerance - limited my calf pain , decrease pratima with gait          Progress towards goals / Updated goals:    1. Patient will demo 100% bridge with pain less than 3/10 for indication of improved core strength for decreased compression with sitting at work - goal progressing but not met - increased from 50% to 60%  2. Patient will demo increased L ext strength to  4-/5 for decreased LS strength with car transfers goal not met - min progress with glut strength at this time. 3. Patient will reports 5 hour work tolerance before increase in pain for progression of ease with work duties goal met at 6 hours    New Goals to be achieved in __4-8_  treatments:  Cont goals 1-2 as above  3.   Patient will report 50% improvement in stair negotiation for safety in the home. RECOMMENDATIONS  Patient would benefit from continuation of skilled PT to address above deficits and progress to increased activity tolerance. Cont 1-2 x 4-8 sessions as needed. If you have any questions/comments please contact us directly at [phone]   Thank you for allowing us to assist in the care of your patient. Therapist Signature: Radha Hendrix PT Date: 9/4/2019     Time: 1011a,    NOTE TO PHYSICIAN:  PLEASE COMPLETE THE ORDERS BELOW AND FAX TO   [facility]: [phone]. If you are unable to process this request in 24 hours please contact our office: [phone].  ___ I have read the above report and request that my patient continue as recommended.   ___ I have read the above report and request that my patient continue therapy with the following changes/special instructions:_________________________________________________________   ___ I have read the above report and request that my patient be discharged from therapy.      Physician Signature:        Date:       Time:

## 2019-09-04 NOTE — PROGRESS NOTES
PT DAILY TREATMENT NOTE     Patient Name: Francois Fields  Date:2019  : 1980  [x]  Patient  Verified  Payor:  / Plan: [facility]  [facility] REGION / Product Type:  /    In time:810 Out time:943   Total Treatment Time (min): 93 (-15 min for transition between modalities) = 78  Visit #: 4 of 4-8    Treatment Area: Lower back pain [M54.5]    SUBJECTIVE  Pain Level (0-10 scale): 5  Any medication changes, allergies to medications, adverse drug reactions, diagnosis change, or new procedure performed?: [x] No    [] Yes (see summary sheet for update)  Subjective functional status/changes:   [] No changes reported  Patient reports \"crampy\"      OBJECTIVE  Modality rationale: Decrease m tightness sec to car transfer   Min Type Additional Details   15 [x]  Traction: [] Cervical       [x]Lumbar                       [] Prone          []Supine                       [x]Intermittent  3 step up  Lbs: 100/50   [] before manual  [x] after manual   10 [x]  Ice     - after traction - NT   Position: semilreclined  Location: L/S,   [x] Skin assessment post-treatment:  [x]intact []redness- no adverse reaction       []redness - adverse reaction:       28 min Therapeutic Exercise:  [x] See flow sheet :    Rationale: increase ROM, increase strength and improve coordination to improve the patients ability to sit, stand, ADLs      25 min Manual Therapy: Technique:  Reassess for PN       IMT NC   Post needling ischemic compression   Roller to L posterior leg   Taping for sciatica L LE    Rationale:      decrease pain, increase ROM, increase tissue extensibility and decrease trigger points to improve patient's ability to sit, ambulate, ADLs    Dry Needling Procedure Note    Dry Needle Session Number:  17    Procedure:    An intramuscular manual therapy (dry needling) and a neuro-muscular re-education treatment was done to deactivate myofascial trigger points, with a 15/30 gauge solid filament needle, under aseptic technique. Indication(s): [] Muscle spasms [x] Myalgia/Myositis  [x] Muscle cramps      [x] Muscle imbalances [] TMD (TMJ) [] Myofascial pain & dysfunction     [] Other: __    Chart reviewed for the following:  Jackie JONES PT, have reviewed the medical history, summary list and precautions/contraindications for [employer].     TIME OUT performed immediately prior to start of procedure:  815am(enter time the timeout was completed)  Jackie JONES PT, have performed the following reviews on [employer] prior to the start of the session:      [x] Patient was identified by name and date of birth    [x] Agreement on all muscles being treated was verified   [x] Purpose of dry needling, side effects, possible complications, risks and benefits were explained to the patient   [x] Procedure site(s) verified  [x] Patient was positioned for comfort and draped for privacy  [x] Informed Consent was signed (initial visit) and verified verbally (subsequent visits)  [x] Patient was instructed on the need to report the use of blood thinners and/or immunosuppressant medications  [x] How to respond to possible adverse effects of treatment  [x] Self treatment of post needling soreness: ice, heat (moist heat, heat wraps) and stretching  [x] Opportunity was given to ask any questions, all questions were answered            Treatment:  The following muscles were treated today:    Right:    Left: Middle HS , medial and lateral Gastroc, B LS paraspinals      Patients response to todays treatment:   [x]  LTRs  [x]  Muscle Relaxation  [x]  Pain Relief  []  Decreased Tinnitus  []  Decreased HAs []  Post needling soreness [x]  Increased ROM   []  Other:        x min Patient Education: [x] Review HEP - taping indications          Other Objective/Functional Measures:    Pain at best 2/10, at worst 7-8/10  Subjective % improvement 80%  Objective:                  [facility]                  Core/ bridge 60%                 Hip strength: flexion 4+/5, ABD L 4, R 4 Extension R 3+, L 3    Improvements: sitting at work: 6 hours, overall improved mobility, driving,    Deficits stairs, walking tolerance - limited my calf pain , decrease pratima with gait     Pain Level (0-10 scale) post treatment: 3    ASSESSMENT/Changes in Function: SEE PN     Patient will continue to benefit from skilled PT services to modify and progress therapeutic interventions, address functional mobility deficits, address ROM deficits, address strength deficits, analyze and address soft tissue restrictions, analyze and cue movement patterns, analyze and modify body mechanics/ergonomics, assess and modify postural abnormalities and instruct in home and community integration to attain remaining goals. []  See Plan of Care  [x]  See progress note/recertification 0/3/73  []  See Discharge Summary         Progress towards goals / Updated goals:    1. Patient will demo 100% bridge with pain less than 3/10 for indication of improved core strength for decreased compression with sitting at work - goal progressing but not met - increased from 50% to 60%  2. Patient will demo increased L ext strength to  4-/5 for decreased LS strength with car transfers goal not met - min progress with glut strength at this time.     3. Patient will reports 5 hour work tolerance before increase in pain for progression of ease with work duties goal met at 6 hours    PLAN  [x]  Upgrade activities as tolerated     []  Continue plan of care  []  Update interventions per flow sheet       []  Discharge due to:_  [x]  Other: Cont per PN 1x4   Assess response to sciatic taping     Sailaja Montemayor DPT  9/4/2019

## 2019-09-09 ENCOUNTER — HOSPITAL ENCOUNTER (OUTPATIENT)
Dept: PHYSICAL THERAPY | Age: 39
Discharge: HOME | End: 2019-09-09
Payer: OTHER GOVERNMENT

## 2019-09-09 PROCEDURE — 97012 MECHANICAL TRACTION THERAPY: CPT

## 2019-09-09 PROCEDURE — 97140 MANUAL THERAPY 1/> REGIONS: CPT

## 2019-09-09 PROCEDURE — 97110 THERAPEUTIC EXERCISES: CPT

## 2019-09-09 NOTE — PROGRESS NOTES
PT DAILY TREATMENT NOTE     Patient Name: Aarti Cowan  Date:2019  : 1980  [x]  Patient  Verified  Payor:  / Plan: [facility] REGION / Product Type:  /    In time:807 Out time:923  Total Treatment Time (min): 76  Visit #: 1 of 4-8    Treatment Area: Lower back pain [M54.5]    SUBJECTIVE  Pain Level (0-10 scale): 4  Any medication changes, allergies to medications, adverse drug reactions, diagnosis change, or new procedure performed?: [x] No    [] Yes (see summary sheet for update)  Subjective functional status/changes:   [] No changes reported  Patient reports \" stiffness\"  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 OBJECTIVE  Modality rationale: Decrease m tightness sec to car transfer   Min Type Additional Details   15 [x]  Traction: [] Cervical       [x]Lumbar                       [] Prone          []Supine                       [x]Intermittent  3 step up  Lbs: 100/50   [] before manual  [x] after manual   10 [x]  Ice     - after traction - NT   Position: semilreclined  Location: L/S,   [x] Skin assessment post-treatment:  [x]intact []redness- no adverse reaction       []redness - adverse reaction:       36 min Therapeutic Exercise:  [x] See flow sheet :    Rationale: increase ROM, increase strength and improve coordination to improve the patients ability to sit, stand, ADLs      15 min Manual Therapy: Technique:   IMT NC   Post needling ischemic compression   L posterior chain rolling  L LE sciatic taping    Rationale:      decrease pain, increase ROM, increase tissue extensibility and decrease trigger points to improve patient's ability to sit, ambulate, ADLs    Dry Needling Procedure Note    Dry Needle Session Number:  17    Procedure:    An intramuscular manual therapy (dry needling) and a neuro-muscular re-education treatment was done to deactivate myofascial trigger points, with a 15/30 gauge solid filament needle, under aseptic technique. Indication(s): [] Muscle spasms [x] Myalgia/Myositis  [x] Muscle cramps      [x] Muscle imbalances [] TMD (TMJ) [] Myofascial pain & dysfunction     [] Other: __    Chart reviewed for the following:  Elo JONES PT, have reviewed the medical history, summary list and precautions/contraindications for [employer]. TIME OUT performed immediately prior to start of procedure:  815am (enter time the timeout was completed)  Elo JONES PT, have performed the following reviews on [employer] prior to the start of the session:      [x] Patient was identified by name and date of birth    [x] Agreement on all muscles being treated was verified   [x] Purpose of dry needling, side effects, possible complications, risks and benefits were explained to the patient   [x] Procedure site(s) verified  [x] Patient was positioned for comfort and draped for privacy  [x] Informed Consent was signed (initial visit) and verified verbally (subsequent visits)  [x] Patient was instructed on the need to report the use of blood thinners and/or immunosuppressant medications  [x] How to respond to possible adverse effects of treatment  [x] Self treatment of post needling soreness: ice, heat (moist heat, heat wraps) and stretching  [x] Opportunity was given to ask any questions, all questions were answered            Treatment:  The following muscles were treated today:    Right:    Left: Middle HS , medial and lateral Gastroc, B LS paraspinals       Patients response to todays treatment:   [x]  LTRs  [x]  Muscle Relaxation  [x]  Pain Relief  []  Decreased Tinnitus  []  Decreased HAs []  Post needling soreness [x]  Increased ROM   []  Other:        x min Patient Education: [x] Review HEP       Other Objective/Functional Measures:    Resumed core strengthening therex.      Pain Level (0-10 scale) post treatment: 2    ASSESSMENT/Changes in Function: Patient reports sciatic taping helped but didn't last long sec to getting caught in the rain. Patient reports getting cavitation with traction last session that aided in pain relief - possibly sec to LS needling last session improving myofascial mobility prior to traction. Patient will continue to benefit from skilled PT services to modify and progress therapeutic interventions, address functional mobility deficits, address ROM deficits, address strength deficits, analyze and address soft tissue restrictions, analyze and cue movement patterns, analyze and modify body mechanics/ergonomics, assess and modify postural abnormalities and instruct in home and community integration to attain remaining goals. []  See Plan of Care  [x]  See progress note/recertification 5/2/81  []  See Discharge Summary         Progress towards goals / Updated goals:  Goals updated via PN last session 9/9/19    1. Patient will demo 100% bridge with pain less than 3/10 for indication of improved core strength for decreased compression with sitting at work - goal progressing but not met - increased from 50% to 60%  2. Patient will demo increased L ext strength to  4-/5 for decreased LS strength with car transfers goal not met - min progress with glut strength at this time. 3.  Patient will report 50% improvement in stair negotiation for safety in the home.      PLAN  [x]  Upgrade activities as tolerated     []  Continue plan of care  []  Update interventions per flow sheet       []  Discharge due to:_  [x]  Other: Patient to schedule more as insurance is auth     Esteban Velasquez DPT  9/9/2019

## 2019-09-16 ENCOUNTER — APPOINTMENT (OUTPATIENT)
Dept: PHYSICAL THERAPY | Age: 39
End: 2019-09-16
Payer: OTHER GOVERNMENT

## 2019-09-20 ENCOUNTER — HOSPITAL ENCOUNTER (OUTPATIENT)
Dept: PHYSICAL THERAPY | Age: 39
Discharge: HOME | End: 2019-09-20
Payer: OTHER GOVERNMENT

## 2019-09-20 PROCEDURE — 97110 THERAPEUTIC EXERCISES: CPT

## 2019-09-20 PROCEDURE — 97140 MANUAL THERAPY 1/> REGIONS: CPT

## 2019-09-20 PROCEDURE — 97012 MECHANICAL TRACTION THERAPY: CPT

## 2019-09-20 NOTE — PROGRESS NOTES
PT DAILY TREATMENT NOTE -    Patient Name: Malu Dean  Date:2019  : 1980  [x]  Patient  Verified  Payor:  / Plan: [facility]  [facility] REGION / Product Type:  /    In time:740 Out time:855  Total Treatment Time (min): 75  Visit #: 2 of 4-8    Treatment Area: Lower back pain [M54.5]    SUBJECTIVE  Pain Level (0-10 scale): 5  Any medication changes, allergies to medications, adverse drug reactions, diagnosis change, or new procedure performed?: [x] No    [] Yes (see summary sheet for update)  Subjective functional status/changes:   [] No changes reported  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 OBJECTIVE  Modality rationale: Decrease m tightness sec to car transfer   Min Type Additional Details   15 [x]  Traction: [] Cervical       [x]Lumbar                       [] Prone          []Supine                       [x]Intermittent  3 step up  Lbs: 100/50   [] before manual  [x] after manual   10 [x]  Ice     - after traction - NT   Position: semilreclined  Location: L/S,   [x] Skin assessment post-treatment:  [x]intact []redness- no adverse reaction       []redness - adverse reaction:       35 min Therapeutic Exercise:  [x] See flow sheet :    Rationale: increase ROM, increase strength and improve coordination to improve the patients ability to sit, stand, ADLs      15 min Manual Therapy: Technique:   IMT NC   Post needling ischemic compression   L posterior chain rolling  B \"X\" TO mid HS tendon   Rationale:      decrease pain, increase ROM, increase tissue extensibility and decrease trigger points to improve patient's ability to sit, ambulate, ADLs    Dry Needling Procedure Note    Dry Needle Session Number:  18    Procedure: An intramuscular manual therapy (dry needling) and a neuro-muscular re-education treatment was done to deactivate myofascial trigger points, with a 15/30 gauge solid filament needle, under aseptic technique.     Indication(s): [] Muscle spasms [x] Myalgia/Myositis  [x] Muscle cramps      [x] Muscle imbalances [] TMD (TMJ) [] Myofascial pain & dysfunction     [] Other: __    Chart reviewed for the following:  Kirk JONES, PT, have reviewed the medical history, summary list and precautions/contraindications for [employer]. TIME OUT performed immediately prior to start of procedure:  740m (enter time the timeout was completed)  Kirk JONES, PT, have performed the following reviews on [employer] prior to the start of the session:      [x] Patient was identified by name and date of birth    [x] Agreement on all muscles being treated was verified   [x] Purpose of dry needling, side effects, possible complications, risks and benefits were explained to the patient   [x] Procedure site(s) verified  [x] Patient was positioned for comfort and draped for privacy  [x] Informed Consent was signed (initial visit) and verified verbally (subsequent visits)  [x] Patient was instructed on the need to report the use of blood thinners and/or immunosuppressant medications  [x] How to respond to possible adverse effects of treatment  [x] Self treatment of post needling soreness: ice, heat (moist heat, heat wraps) and stretching  [x] Opportunity was given to ask any questions, all questions were answered            Treatment:  The following muscles were treated today:    Right:    Left: B HS mid muscle belly, (L greater thanR ) needle and spin technique.       Patients response to todays treatment:   [x]  LTRs  [x]  Muscle Relaxation  [x]  Pain Relief  []  Decreased Tinnitus  []  Decreased HAs []  Post needling soreness [x]  Increased ROM   []  Other:        x min Patient Education: [x] Review HEP , discussed self massage strategies       Other Objective/Functional Measures:    PRE progression - retro TM for glut activation, progressed TB resistance     Pain Level (0-10 scale) post treatment: 4    ASSESSMENT/Changes in Function: Patient not seen since 9/9 sec to pending insurance auth. Primary co today - B (L greater than R) cramping in HS m belly. Patient tolerated PRE progression well. Increased traction 5# to increased decompression     Patient will continue to benefit from skilled PT services to modify and progress therapeutic interventions, address functional mobility deficits, address ROM deficits, address strength deficits, analyze and address soft tissue restrictions, analyze and cue movement patterns, analyze and modify body mechanics/ergonomics, assess and modify postural abnormalities and instruct in home and community integration to attain remaining goals. []  See Plan of Care  [x]  See progress note/recertification 6/7/88  []  See Discharge Summary         Progress towards goals / Updated goals:      1. Patient will demo 100% bridge with pain less than 3/10 for indication of improved core strength for decreased compression with sitting at work - goal progressing but not met - increased from 50% to 60%  2. Patient will demo increased L ext strength to  4-/5 for decreased LS strength with car transfers goal not met - min progress with glut strength at this time. , added retro TM today to progress goal 9/20/19  3. Patient will report 50% improvement in stair negotiation for safety in the home.      PLAN  [x]  Upgrade activities as tolerated     []  Continue plan of care  []  Update interventions per flow sheet       []  Discharge due to:_  [x]  Other: Cont 1x per week, add swimmers on NV     Trace Acme DPT  9/20/2019

## 2019-09-23 ENCOUNTER — HOSPITAL ENCOUNTER (OUTPATIENT)
Dept: PHYSICAL THERAPY | Age: 39
Discharge: HOME | End: 2019-09-23
Payer: OTHER GOVERNMENT

## 2019-09-23 PROCEDURE — 97140 MANUAL THERAPY 1/> REGIONS: CPT

## 2019-09-23 PROCEDURE — 97110 THERAPEUTIC EXERCISES: CPT

## 2019-09-23 PROCEDURE — 97012 MECHANICAL TRACTION THERAPY: CPT

## 2019-09-23 NOTE — PROGRESS NOTES
PT DAILY TREATMENT NOTE     Patient Name: Aarti Cowan  Date:2019  : 1980  [x]  Patient  Verified  Payor:  / Plan: [facility] / Product Type:  /    In time:735 Out time:858  Total Treatment Time (min): 82  Visit #: 3 of 4-8    Treatment Area: Lower back pain [M54.5]    SUBJECTIVE  Pain Level (0-10 scale): 4   Any medication changes, allergies to medications, adverse drug reactions, diagnosis change, or new procedure performed?: [x] No    [] Yes (see summary sheet for update)  Subjective functional status/changes:   [] No changes reported  Patient reports leg cramping    OBJECTIVE  Modality rationale: Decrease m tightness sec to car transfer   Min Type Additional Details   15 [x]  Traction: [] Cervical       [x]Lumbar                       [] Prone          []Supine                       [x]Intermittent  3 step up  Lbs: 100/50   [] before manual  [x] after manual   10 [x]  Ice     - after traction - NT   Position: semilreclined  Location: L/S,   [x] Skin assessment post-treatment:  [x]intact []redness- no adverse reaction       []redness - adverse reaction:       42 min Therapeutic Exercise:  [x] See flow sheet :    Rationale: increase ROM, increase strength and improve coordination to improve the patients ability to sit, stand, ADLs      15 min Manual Therapy: Technique:   IMT NC   Post needling ischemic compression   L posterior chain rolling  B \"X\" TO mid HS tendon and GSC inhibition L    Rationale:      decrease pain, increase ROM, increase tissue extensibility and decrease trigger points to improve patient's ability to sit, ambulate, ADLs    Dry Needling Procedure Note    Dry Needle Session Number:  21    Procedure: An intramuscular manual therapy (dry needling) and a neuro-muscular re-education treatment was done to deactivate myofascial trigger points, with a 15/30 gauge solid filament needle, under aseptic technique.     Indication(s): [] Muscle spasms [x] Myalgia/Myositis  [x] Muscle cramps      [x] Muscle imbalances [] TMD (TMJ) [] Myofascial pain & dysfunction     [] Other: __    Chart reviewed for the following:  Dvaid JONES PT, have reviewed the medical history, summary list and precautions/contraindications for [employer]. TIME OUT performed immediately prior to start of procedure:  740a(enter time the timeout was completed)  David JONES PT, have performed the following reviews on [employer] prior to the start of the session:      [x] Patient was identified by name and date of birth    [x] Agreement on all muscles being treated was verified   [x] Purpose of dry needling, side effects, possible complications, risks and benefits were explained to the patient   [x] Procedure site(s) verified  [x] Patient was positioned for comfort and draped for privacy  [x] Informed Consent was signed (initial visit) and verified verbally (subsequent visits)  [x] Patient was instructed on the need to report the use of blood thinners and/or immunosuppressant medications  [x] How to respond to possible adverse effects of treatment  [x] Self treatment of post needling soreness: ice, heat (moist heat, heat wraps) and stretching  [x] Opportunity was given to ask any questions, all questions were answered            Treatment:  The following muscles were treated today:    Right:    Left: B HS mid muscle belly, L only , L gastroc , needle and spin technique.       Patients response to todays treatment:   [x]  LTRs  [x]  Muscle Relaxation  [x]  Pain Relief  []  Decreased Tinnitus  []  Decreased HAs []  Post needling soreness [x]  Increased ROM   []  Other:        x min Patient Education: [x] Review HEP      Other Objective/Functional Measures:    PRE progression - swimmer, increased plank time      Pain Level (0-10 scale) post treatment: 3    ASSESSMENT/Changes in Function: Patient has second consult with neurosurgeon today, however patient does not feel she is a surgical candidate at this time. Patient reports co 20 min calf cramping over the weekend. Patient tolerated PRE progression well with VCing for core engagement. Patient will continue to benefit from skilled PT services to modify and progress therapeutic interventions, address functional mobility deficits, address ROM deficits, address strength deficits, analyze and address soft tissue restrictions, analyze and cue movement patterns, analyze and modify body mechanics/ergonomics, assess and modify postural abnormalities and instruct in home and community integration to attain remaining goals. []  See Plan of Care  [x]  See progress note/recertification 9/6/89  []  See Discharge Summary         Progress towards goals / Updated goals:      1. Patient will demo 100% bridge with pain less than 3/10 for indication of improved core strength for decreased compression with sitting at work - goal progressing but not met - increased from 50% to 60%, added swimmers to progress lumbar strength 9/23/19  2. Patient will demo increased L ext strength to  4-/5 for decreased LS strength with car transfers goal not met - min progress with glut strength at this time. , added retro TM today to progress goal 9/20/19  3. Patient will report 50% improvement in stair negotiation for safety in the home.       PLAN  [x]  Upgrade activities as tolerated     []  Continue plan of care  []  Update interventions per flow sheet       []  Discharge due to:_  [x]  Other: Cont 1x per week - patient has 2 more weeks scheduled     Alicia Willingham DPJAIME  9/23/2019

## 2019-09-30 ENCOUNTER — HOSPITAL ENCOUNTER (OUTPATIENT)
Dept: PHYSICAL THERAPY | Age: 39
Discharge: HOME | End: 2019-09-30
Payer: OTHER GOVERNMENT

## 2019-09-30 PROCEDURE — 97140 MANUAL THERAPY 1/> REGIONS: CPT

## 2019-09-30 PROCEDURE — 97110 THERAPEUTIC EXERCISES: CPT

## 2019-09-30 PROCEDURE — 97014 ELECTRIC STIMULATION THERAPY: CPT

## 2019-09-30 NOTE — PROGRESS NOTES
PT DAILY TREATMENT NOTE     Patient Name: Zoila Raymond  Date:2019  : 1980  [x]  Patient  Verified  Payor:  / Plan: [facility]  [facility] REGION / Product Type:  /    In time:737 Out time:835  Total Treatment Time (min): 58  Visit #: 4 of 4-8    Treatment Area: Lower back pain [M54.5]    SUBJECTIVE  Pain Level (0-10 scale): 5  Any medication changes, allergies to medications, adverse drug reactions, diagnosis change, or new procedure performed?: [x] No    [] Yes (see summary sheet for update)  Subjective functional status/changes:   [] No changes reported  Patient reports L LE has been in a continual cramp all weekend. OBJECTIVE  Modality rationale: Decrease m tightness sec to car transfer   Min Type Additional Details   NT [x]  Traction: [] Cervical       [x]Lumbar                       [] Prone          []Supine                       [x]Intermittent  3 step up  Lbs: 100/50   [] before manual  [x] after manual   With ES [x]  Ice     - after traction - NT   Position: semilreclined  Location: L/S,   15 HV ES to L LS and L HS to [address] - prone  With ice    [x] Skin assessment post-treatment:  [x]intact []redness- no adverse reaction       []redness - adverse reaction:       28 min Therapeutic Exercise:  [x] See flow sheet :    Rationale: increase ROM, increase strength and improve coordination to improve the patients ability to sit, stand, ADLs      15 min Manual Therapy: Technique:   IMT NC   Post needling ischemic compression   Prone quad stretch B 90 sec hold   Reassess after MD apt    Rationale:      decrease pain, increase ROM, increase tissue extensibility and decrease trigger points to improve patient's ability to sit, ambulate, ADLs    Dry Needling Procedure Note    Dry Needle Session Number:  22    Procedure:    An intramuscular manual therapy (dry needling) and a neuro-muscular re-education treatment was done to deactivate myofascial trigger points, with a 15/30 gauge solid filament needle, under aseptic technique. Indication(s): [] Muscle spasms [x] Myalgia/Myositis  [x] Muscle cramps      [x] Muscle imbalances [] TMD (TMJ) [] Myofascial pain & dysfunction     [] Other: __    Chart reviewed for the following:  Parth JONES PT, have reviewed the medical history, summary list and precautions/contraindications for [employer]. TIME OUT performed immediately prior to start of procedure:  740am(enter time the timeout was completed)  Parth JONES PT, have performed the following reviews on [employer] prior to the start of the session:      [x] Patient was identified by name and date of birth    [x] Agreement on all muscles being treated was verified   [x] Purpose of dry needling, side effects, possible complications, risks and benefits were explained to the patient   [x] Procedure site(s) verified  [x] Patient was positioned for comfort and draped for privacy  [x] Informed Consent was signed (initial visit) and verified verbally (subsequent visits)  [x] Patient was instructed on the need to report the use of blood thinners and/or immunosuppressant medications  [x] How to respond to possible adverse effects of treatment  [x] Self treatment of post needling soreness: ice, heat (moist heat, heat wraps) and stretching  [x] Opportunity was given to ask any questions, all questions were answered            Treatment:  The following muscles were treated today:    Right:    Left: B HS mid muscle belly, L LS paraspinals, needle hold and spin technique.       Patients response to todays treatment:   [x]  LTRs  [x]  Muscle Relaxation  [x]  Pain Relief  []  Decreased Tinnitus  []  Decreased HAs []  Post needling soreness [x]  Increased ROM   []  Other:        x min Patient Education: [x] Review HEP - prone quad stretch      Other Objective/Functional Measures:    Stair negotiation :  Step to pattern leading with R LE    Improvement: \"back to normal routine\" Chelsy - + B    Pain Level (0-10 scale) post treatment: 4    ASSESSMENT/Changes in Function: Patient reports second opinion resulted in recommendation for fusion- patient will continue cause with Dr Grover Reinoso. Patient reports L HS cramping over the weekend causing amb difficulty. Educated on upcoming reassessment next visit - may or Mcconnell Magno continue based on progression after today sessions. Ant chain tightness noted and recomm patient to stretch quad. Patient reports she cont LIZETH and LS but with increasing tightness. Patient encouraged to stretch less frequently. Patient will continue to benefit from skilled PT services to modify and progress therapeutic interventions, address functional mobility deficits, address ROM deficits, address strength deficits, analyze and address soft tissue restrictions, analyze and cue movement patterns, analyze and modify body mechanics/ergonomics, assess and modify postural abnormalities and instruct in home and community integration to attain remaining goals. []  See Plan of Care  [x]  See progress note/recertification 6/7/18  []  See Discharge Summary         Progress towards goals / Updated goals:      1. Patient will demo 100% bridge with pain less than 3/10 for indication of improved core strength for decreased compression with sitting at work - goal progressing but not met - increased from 50% to 60%, added swimmers to progress lumbar strength 9/23/19  2. Patient will demo increased L ext strength to  4-/5 for decreased LS strength with car transfers goal not met - min progress with glut strength at this time. , added retro TM today to progress goal 9/20/19  3. Patient will report 50% improvement in stair negotiation for safety in the home.   Goal not me t- patient continue to require step to pattern sec to L LE strength deficits  9/30/19    PLAN  [x]  Upgrade activities as tolerated     []  Continue plan of care  []  Update interventions per flow sheet       [] Discharge due to:_  [x]  Other: 1 weeks scheduled - will reassess at that time.  -   Assess response to HV ANT Gavin DPT  9/30/2019

## 2019-10-07 ENCOUNTER — HOSPITAL ENCOUNTER (OUTPATIENT)
Dept: PHYSICAL THERAPY | Age: 39
Discharge: HOME | End: 2019-10-07
Payer: OTHER GOVERNMENT

## 2019-10-07 PROCEDURE — 97032 APPL MODALITY 1+ESTIM EA 15: CPT

## 2019-10-07 PROCEDURE — 97012 MECHANICAL TRACTION THERAPY: CPT

## 2019-10-07 PROCEDURE — 97140 MANUAL THERAPY 1/> REGIONS: CPT

## 2019-10-07 PROCEDURE — 97110 THERAPEUTIC EXERCISES: CPT

## 2019-10-07 NOTE — PROGRESS NOTES
PT DAILY TREATMENT NOTE -    Patient Name: Chadd Alonso  Date:10/7/2019  : 1980  [x]  Patient  Verified  Payor:  / Plan: [facility]  [facility] REGION / Product Type:  /    In time:809 Out time:920  Total Treatment Time (min): 71  Visit #: 5 of 4-8    Treatment Area: Lower back pain [M54.5]    SUBJECTIVE  Pain Level (0-10 scale): 8  Any medication changes, allergies to medications, adverse drug reactions, diagnosis change, or new procedure performed?: [x] No    [] Yes (see summary sheet for update)  Subjective functional status/changes:   [] No changes reported  Patient reports increased pain and radiating sx     OBJECTIVE  Modality rationale: imrpoved spinal decompression with traction, and decrease myofascial tightness with BEVERLEY machine to improve transfers, gait and daily ADLs    Min Type Additional Details   15 [x]  Traction: [] Cervical       [x]Lumbar                       [] Prone          []Supine                       [x]Intermittent  3 step up  Lbs: 105/50   [] before manual  [x] after manual   10 [x]  Ice     - after traction - NT   Position: semilreclined  Location: L/S, and L HS    10 BEVERLEY - to L HS and LS  With needling    [x] Skin assessment post-treatment:  [x]intact []redness- no adverse reaction       []redness - adverse reaction:       21 min Therapeutic Exercise:  [x] See flow sheet : REASSESS   Rationale: increase ROM, increase strength and improve coordination to improve the patients ability to sit, stand, ADLs      15 min Manual Therapy: Technique:   IMT NC   Post needling ischemic compression   Rolling to LS and L HS   PA mobs to LS      Rationale:      decrease pain, increase ROM, increase tissue extensibility and decrease trigger points to improve patient's ability to sit, ambulate, ADLs    Dry Needling Procedure Note    Dry Needle Session Number:  23    Procedure:    An intramuscular manual therapy (dry needling) and a neuro-muscular re-education treatment was done to deactivate myofascial trigger points, with a 15/30 gauge solid filament needle, under aseptic technique. Indication(s): [] Muscle spasms [x] Myalgia/Myositis  [x] Muscle cramps      [x] Muscle imbalances [] TMD (TMJ) [] Myofascial pain & dysfunction     [] Other: __    Chart reviewed for the following:  Oral JONES PT, have reviewed the medical history, summary list and precautions/contraindications for [employer].     TIME OUT performed immediately prior to start of procedure:  740am(enter time the timeout was completed)  Oral JONES PT, have performed the following reviews on [employer] prior to the start of the session:      [x] Patient was identified by name and date of birth    [x] Agreement on all muscles being treated was verified   [x] Purpose of dry needling, side effects, possible complications, risks and benefits were explained to the patient   [x] Procedure site(s) verified  [x] Patient was positioned for comfort and draped for privacy  [x] Informed Consent was signed (initial visit) and verified verbally (subsequent visits)  [x] Patient was instructed on the need to report the use of blood thinners and/or immunosuppressant medications  [x] How to respond to possible adverse effects of treatment  [x] Self treatment of post needling soreness: ice, heat (moist heat, heat wraps) and stretching  [x] Opportunity was given to ask any questions, all questions were answered            Treatment:  The following muscles were treated today:    Right:    Left: B HS mid muscle belly, L LS paraspinals, needle hold and spin technique     Patients response to todays treatment:   [x]  LTRs  [x]  Muscle Relaxation  [x]  Pain Relief  []  Decreased Tinnitus  []  Decreased HAs []  Post needling soreness [x]  Increased ROM   []  Other:        x min Patient Education: [x] Review HEP     Other Objective/Functional Measures:    Goals assessed for PN   Pain at best 4, at worst 10  Subjective % improvement 80% (same as last session)  Objective:    LS AROM NT sec to exacerbation of pain    Core/ bridge : 50% sec to elevated pain levels. Hip strength :  Unable to test sec to elevated pain levels     Chelsy + B   Improvements: \"normal routine\"  Deficits stairs - step to pattern, HS cramping , standing tolerance - 5 min     Pain Level (0-10 scale) post treatment: 6    ASSESSMENT/Changes in Function: SEE PN. Patient presents today with elevated pain levels , antalgic gait and frustration with pain. Patient attempted to see referring MD for another coritsone shot, but they are not available till Nov. Initiated treatment with traction today to see if it aided in managing pain. Patient feels inflamed and is unable to note exacerbating cause. Patient will continue to benefit from skilled PT services to modify and progress therapeutic interventions, address functional mobility deficits, address ROM deficits, address strength deficits, analyze and address soft tissue restrictions, analyze and cue movement patterns, analyze and modify body mechanics/ergonomics, assess and modify postural abnormalities and instruct in home and community integration to attain remaining goals. []  See Plan of Care  [x]  See progress note/recertification 2/4/52  []  See Discharge Summary         Progress towards goals / Updated goals:      1. Patient will demo 100% bridge with pain less than 3/10 for indication of improved core strength for decreased compression with sitting at work - goal not met sec to elevated pain levels   2. Patient will demo increased L ext strength to  4-/5 for decreased LS strength with car transfers goal NT sec to elevated pain levels   3. Patient will report 50% improvement in stair negotiation for safety in the home.   Goal not met sec to step to step stair negotiation     PLAN  [x]  Upgrade activities as tolerated     []  Continue plan of care  []  Update interventions per flow sheet []  Discharge due to:_  [x]  Other: cont 1x 4 per PN     Ron Call DPT  10/7/2019

## 2019-10-07 NOTE — PROGRESS NOTES
[address] [facility] [address] [facility] AT [address]  Phone: [phone] Fax: [phone]  PROGRESS NOTE  Patient Name: Cuauhtemoc Obregon : 1980   Treatment/Medical Diagnosis: Lower back pain [M54.5]   Referral Source: Satya Dent MD     Date of Initial Visit: 19 Attended Visits: 19 Missed Visits: 1     SUMMARY OF TREATMENT    Pt is a 45 y. o. year old female who presents with co LS pain with L LE radiculopathy and tightness/muscle spasms and numbness of posterior chain starting with insidious onset late April. Hx LS discectomy in 2017. Treatment has included IMT, therex and traction. CURRENT STATUS  Patient has had an exacerbation of pain with unknown cause resulting in decreased functional mobility, antalgic gait and decreased overall QOL. Unable to perform formal reassessment sec to elevated pain level to 8/10 at worst  Pain at best 4, at worst 10  Subjective % improvement 80% (same as last session)  Objective:    LS AROM NT sec to exacerbation of pain    Core/ bridge : 50% sec to elevated pain levels. Hip strength :  Unable to test sec to elevated pain levels     Chelsy + B   Improvements: \"normal routine\"  Deficits stairs - step to pattern, HS cramping , standing tolerance - 5 min          Progress towards goals / Updated goals:      1. Patient will demo 100% bridge with pain less than 3/10 for indication of improved core strength for decreased compression with sitting at work - goal not met sec to elevated pain levels   2. Patient will demo increased L ext strength to  4-/5 for decreased LS strength with car transfers goal NT sec to elevated pain levels   3. Patient will report 50% improvement in stair negotiation for safety in the home.   Goal not met sec to step to step stair negotiation     New Goals to be achieved in __4-8_  treatments:  Cont per unmet goals as below     RECOMMENDATIONS  Patient would benefit from continuation of skilled PT to address above deficits and progress to increased activity tolerance. Cont 1-2 x 4-8 sessions as needed. If you have any questions/comments please contact us directly at [phone]   Thank you for allowing us to assist in the care of your patient. Therapist Signature: Juliet Hough PT Date: 10/7/2019     Time: 923am    NOTE TO PHYSICIAN:  PLEASE COMPLETE THE ORDERS BELOW AND FAX TO   [facility]: [phone]. If you are unable to process this request in 24 hours please contact our office: [phone].  ___ I have read the above report and request that my patient continue as recommended.   ___ I have read the above report and request that my patient continue therapy with the following changes/special instructions:_________________________________________________________   ___ I have read the above report and request that my patient be discharged from therapy.      Physician Signature:        Date:       Time:

## 2019-10-11 ENCOUNTER — HOSPITAL ENCOUNTER (OUTPATIENT)
Dept: PHYSICAL THERAPY | Age: 39
Discharge: HOME | End: 2019-10-11
Payer: OTHER GOVERNMENT

## 2019-10-11 PROCEDURE — 97012 MECHANICAL TRACTION THERAPY: CPT

## 2019-10-11 PROCEDURE — 97110 THERAPEUTIC EXERCISES: CPT

## 2019-10-11 PROCEDURE — 97140 MANUAL THERAPY 1/> REGIONS: CPT

## 2019-10-11 NOTE — PROGRESS NOTES
PT DAILY TREATMENT NOTE     Patient Name: Sharol Soulier  Date:10/11/2019  : 1980  [x]  Patient  Verified  Payor:  / Plan: [facility] REGION / Product Type:  /    In time:704 Out time:800  Total Treatment Time (min): 56  Visit #: 1 of -    Treatment Area: Lower back pain [M54.5]    SUBJECTIVE  Pain Level (0-10 scale): 6  LS and L LE   Any medication changes, allergies to medications, adverse drug reactions, diagnosis change, or new procedure performed?: [x] No    [] Yes (see summary sheet for update)  Subjective functional status/changes:   [] No changes reported  Patient reports difficulty with walking and driving     OBJECTIVE  Modality rationale: imrpoved spinal decompression with traction, and decrease myofascial tightness with BEVERLEY machine to improve transfers, gait and daily ADLs    Min Type Additional Details   20  [x]  Traction: [] Cervical       [x]Lumbar                       [] Prone          []Supine                       [x]Intermittent  3 step up  Lbs: 115/50  [] before manual  [x] after manual   10 [x]  Ice     - after traction - NT   Position: semilreclined  Location: L/S, and L HS    NT BEVERLEY - to L HS and LS  With needling    [x] Skin assessment post-treatment:  [x]intact []redness- no adverse reaction       []redness - adverse reaction:       10 min Therapeutic Exercise:  [x] See flow sheet :PPU , discuss sx and HEP    Rationale: increase ROM, increase strength and improve coordination to improve the patients ability to sit, stand, ADLs      26 min Manual Therapy: Technique:   IMT NC   Post needling ischemic compression   TPR L HS and calf post needling   PA mobs to LS  - small cavitation      Rationale:      decrease pain, increase ROM, increase tissue extensibility and decrease trigger points to improve patient's ability to sit, ambulate, ADLs    Dry Needling Procedure Note    Dry Needle Session Number:  24    Procedure:    An intramuscular manual therapy (dry needling) and a neuro-muscular re-education treatment was done to deactivate myofascial trigger points, with a 15/30 gauge solid filament needle, under aseptic technique. Indication(s): [] Muscle spasms [x] Myalgia/Myositis  [x] Muscle cramps      [x] Muscle imbalances [] TMD (TMJ) [] Myofascial pain & dysfunction     [] Other: __    Chart reviewed for the following:  IRuben PT, have reviewed the medical history, summary list and precautions/contraindications for [employer].     TIME OUT performed immediately prior to start of procedure:  336V (enter time the timeout was completed)  Ruben JONES PT, have performed the following reviews on [employer] prior to the start of the session:      [x] Patient was identified by name and date of birth    [x] Agreement on all muscles being treated was verified   [x] Purpose of dry needling, side effects, possible complications, risks and benefits were explained to the patient   [x] Procedure site(s) verified  [x] Patient was positioned for comfort and draped for privacy  [x] Informed Consent was signed (initial visit) and verified verbally (subsequent visits)  [x] Patient was instructed on the need to report the use of blood thinners and/or immunosuppressant medications  [x] How to respond to possible adverse effects of treatment  [x] Self treatment of post needling soreness: ice, heat (moist heat, heat wraps) and stretching  [x] Opportunity was given to ask any questions, all questions were answered            Treatment:  The following muscles were treated today:    Right:    Left: B HS mid muscle belly, L LS paraspinals, needle hold and spin technique      Patients response to todays treatment:   [x]  LTRs  [x]  Muscle Relaxation  [x]  Pain Relief  []  Decreased Tinnitus  []  Decreased HAs []  Post needling soreness [x]  Increased ROM   []  Other:        x min Patient Education: [x] Review HEP     Other Objective/Functional Measures:    Gait - antalgic    Walking tolerance - 2 min     Pain Level (0-10 scale) post treatment: 5    ASSESSMENT/Changes in Function: Patient will be going to see referring MD next week sec to elevated pain levels. Patient is hoping to get cortisone injection from Dr Nicole Gerber. Patient is able to demo Full ROm in prone but co main pain with standing and walking sec to [address] [facility]. Encouraged compression sleeve for pain relief for radicular pain. Patient will continue to benefit from skilled PT services to modify and progress therapeutic interventions, address functional mobility deficits, address ROM deficits, address strength deficits, analyze and address soft tissue restrictions, analyze and cue movement patterns, analyze and modify body mechanics/ergonomics, assess and modify postural abnormalities and instruct in home and community integration to attain remaining goals. []  See Plan of Care  [x]  See progress note/recertification 53/2/71  []  See Discharge Summary         Progress towards goals / Updated goals:  Cont per unmet goals - PN complete last session 10/11/19 - slow progress sec to recent flare up of pain     1. Patient will demo 100% bridge with pain less than 3/10 for indication of improved core strength for decreased compression with sitting at work - goal not met sec to elevated pain levels   2. Patient will demo increased L ext strength to  4-/5 for decreased LS strength with car transfers goal NT sec to elevated pain levels   3. Patient will report 50% improvement in stair negotiation for safety in the home.   Goal not met sec to step to step stair negotiation     PLAN  [x]  Upgrade activities as tolerated     []  Continue plan of care  []  Update interventions per flow sheet       []  Discharge due to:_  [x]  Other: attempt rotation component GRETCHEN Lopez DPT  10/11/2019

## 2019-10-15 ENCOUNTER — OFFICE VISIT (OUTPATIENT)
Dept: ORTHOPEDIC SURGERY | Age: 39
End: 2019-10-15

## 2019-10-15 VITALS
SYSTOLIC BLOOD PRESSURE: 140 MMHG | OXYGEN SATURATION: 99 % | RESPIRATION RATE: 18 BRPM | BODY MASS INDEX: 41.16 KG/M2 | TEMPERATURE: 98.4 F | HEART RATE: 79 BPM | DIASTOLIC BLOOD PRESSURE: 86 MMHG | WEIGHT: 271.6 LBS | HEIGHT: 68 IN

## 2019-10-15 DIAGNOSIS — M54.16 LUMBAR RADICULOPATHY: ICD-10-CM

## 2019-10-15 DIAGNOSIS — M51.26 HNP (HERNIATED NUCLEUS PULPOSUS), LUMBAR: Primary | ICD-10-CM

## 2019-10-15 NOTE — PROGRESS NOTES
Yahir Lugo presents today for   Chief Complaint   Patient presents with    Follow-up    Back Pain    Leg Pain     Left       Is someone accompanying this pt? No    Is the patient using any DME equipment during OV? No    Depression Screening:  3 most recent PHQ Screens 10/15/2019   Little interest or pleasure in doing things Not at all   Feeling down, depressed, irritable, or hopeless Not at all   Total Score PHQ 2 0       Learning Assessment:  Learning Assessment 7/23/2019   PRIMARY LEARNER Patient   PRIMARY LANGUAGE ENGLISH   LEARNER PREFERENCE PRIMARY READING   ANSWERED BY patient   RELATIONSHIP SELF       Abuse Screening:  Abuse Screening Questionnaire 10/15/2019   Do you ever feel afraid of your partner? N   Are you in a relationship with someone who physically or mentally threatens you? N   Is it safe for you to go home? Y       Fall Risk  Fall Risk Assessment, last 12 mths 10/15/2019   Able to walk? Yes   Fall in past 12 months? No       Coordination of Care:  1. Have you been to the ER, urgent care clinic since your last visit? No  Hospitalized since your last visit? No    2. Have you seen or consulted any other health care providers outside of the [address] [place] [address] since your last visit? No Include any pap smears or colon screening.  No

## 2019-10-15 NOTE — H&P (VIEW-ONLY)
[facility]. 
[address], [address] [place], [address] [place] [address] Street Phone: [phone] Fax: [phone] PROGRESS NOTE Patient: Angeles Llanos                MRN: [id number]       SSN: [id number] YOB: 1980        AGE: 44 y.o. SEX: female Body mass index is 41.3 kg/m². PCP: Micah Fenton MD 
10/15/19 Chief Complaint Patient presents with  Follow-up  Back Pain  Leg Pain Left HISTORY OF PRESENT ILLNESS, RADIOGRAPHS, and PLAN:  
 
HISTORY OF PRESENT ILLNESS:  Ms. Alecia Aguilar returns today. She was doing very well until about three weeks ago when she had an increase in her activities and flared up her left paracentral back pain with radiculopathy. This was resolved very well about five months ago with a selective block. She would like to know if we could repeat it. We certainly can. She is waiting for her  to return from deployment and is certainly on edge from that. ASSESSMENT/PLAN: We will repeat her left L4-5 selectives, which were so effective for her the last time and see her back again in followup after that. cc: Micah Fenton MD  
 
 
 
Past Medical History:  
Diagnosis Date  Cancer ([facility] [place] [address] skin at age 8, Thyroid 2005, Endometrial 2012  Chronic back pain  Sciatica of left side  Thyroid disease   
 hypothyroid Family History Problem Relation Age of Onset  No Known Problems Mother  No Known Problems Father Current Outpatient Medications Medication Sig Dispense Refill  diazePAM (VALIUM) 5 mg tablet Take 5 mg by mouth every six (6) hours as needed for Anxiety.  lidocaine (LIDODERM) 5 %  IBUPROFEN (ADVIL PO) Take  by mouth daily as needed.  cyclobenzaprine (FLEXERIL) 10 mg tablet Take 1 Tab by mouth three (3) times daily as needed for Muscle Spasm(s). Indications:  MUSCLE SPASM 40 Tab 0  
  levothyroxine (SYNTHROID) 125 mcg tablet Take 175 mcg by mouth Daily (before breakfast).  celecoxib (CELEBREX) 100 mg capsule Take  by mouth two (2) times a day.  gabapentin (NEURONTIN) 300 mg capsule Take 1 Cap by mouth three (3) times daily. 90 Cap 1  cyclobenzaprine (FLEXERIL) 5 mg tablet  HYDROcodone-acetaminophen (NORCO) 5-325 mg per tablet Take 1 Tab by mouth four (4) times daily as needed for Pain. Max Daily Amount: 4 Tabs. 40 Tab 0 Allergies Allergen Reactions  Morphine Nausea and Vomiting  Prednisone Unknown (comments) Leg cramps Steroid injections ok  Sulfa (Sulfonamide Antibiotics) Hives Past Surgical History:  
Procedure Laterality Date  HX DILATION AND CURETTAGE  [id number]  
 x2  HX HYSTERECTOMY  HX LUMBAR LAMINECTOMY  9/17/15 Amish Jaclyn  HX PARTIAL THYROIDECTOMY   Past Medical History:  
Diagnosis Date  Cancer ([facility] skin at age 8, Thyroid , Endometrial   Chronic back pain  Sciatica of left side  Thyroid disease   
 hypothyroid Social History Socioeconomic History  Marital status: UNKNOWN Spouse name: Not on file  Number of children: Not on file  Years of education: Not on file  Highest education level: Not on file Occupational History  Not on file Social Needs  Financial resource strain: Not on file  Food insecurity:  
  Worry: Not on file Inability: Not on file  Transportation needs:  
  Medical: Not on file Non-medical: Not on file Tobacco Use  Smoking status: Former Smoker Last attempt to quit: 2015 Years since quittin.2  Smokeless tobacco: Never Used Substance and Sexual Activity  Alcohol use: Yes Comment: socially  Drug use: No  
 Sexual activity: Not on file Lifestyle  Physical activity:  
  Days per week: Not on file Minutes per session: Not on file  Stress: Not on file Relationships  Social connections:  
  Talks on phone: Not on file Gets together: Not on file Attends Jehovah's witness service: Not on file Active member of club or organization: Not on file Attends meetings of clubs or organizations: Not on file Relationship status: Not on file  Intimate partner violence:  
  Fear of current or ex partner: Not on file Emotionally abused: Not on file Physically abused: Not on file Forced sexual activity: Not on file Other Topics Concern  Not on file Social History Narrative  Not on file REVIEW OF SYSTEMS: 
 CONSTITUTIONAL SYMPTOMS:  Negative. EYES:  Negative. EARS, NOSE, THROAT AND MOUTH:  Negative. CARDIOVASCULAR:  Negative. RESPIRATORY:  Negative. GENITOURINARY: Per HPI. GASTROINTESTINAL:  Per HPI. INTEGUMENTARY (SKIN AND/OR BREAST):  Negative. MUSCULOSKELETAL: Per HPI. ENDOCRINE/RHEUMATOLOGIC:  Negative. NEUROLOGICAL:  Per HPI. HEMATOLOGIC/LYMPHATIC:  Negative. ALLERGIC/IMMUNOLOGIC:  Negative. PSYCHIATRIC:  Negative. PHYSICAL EXAMINATION:  
Visit Vitals /86 (BP 1 Location: Left arm, BP Patient Position: Sitting) Pulse 79 Temp 98.4 °F (36.9 °C) (Oral) Resp 18 Ht 5' 8\" (1.727 m) Wt 271 lb 9.6 oz (123.2 kg) SpO2 99% BMI 41.30 kg/m² PAIN SCALE: 5/10 CONSTITUTIONAL: The patient is in no apparent distress and is alert and oriented x 3. HEENT: Normocephalic. Hearing grossly intact. NECK: Supple and symmetric. no tenderness, or masses were felt. RESPIRATORY: No labored breathing. CARDIOVASCULAR: The carotid pulses were normal. Peripheral pulses were 2+. CHEST: Normal AP diameter and normal contour without any kyphoscoliosis. LYMPHATIC: No lymphadenopathy was appreciated in the neck, axillae or groin. SKIN:  Incision healing well, no drainage, no erythema, no hernia, no seroma, no swelling, no dehiscence, incision well approximated.  Negative for scars, rashes, lesions, or ulcers on the right upper, right lower, left upper, left lower and trunk. NEUROLOGICAL: Alert and oriented x 3. Ambulation without assistive device. FWB. EXTREMITIES: See musculoskeletal. 
MUSCULOSKELETAL: 
? Head and Neck:  Negative for misalignment, asymmetry, crepitation, defects, tenderness masses or effusions. ? Left Upper Extremity: Inspection, percussion and palpation performed. Lymans sign is negative. ? Right Upper Extremity: Inspection, percussion and palpation performed. Lymans sign is negative. ? Spine, Ribs and Pelvis: Back and LLE pain. Inspection, percussion and palpation performed. Negative for misalignment, asymmetry, crepitation, defects, tenderness masses or effusions. ? Left Lower Extremity: Inspection, percussion and palpation performed. Negative straight leg raise. ? Right Lower Extremity: Inspection, percussion and palpation performed. Negative straight leg raise. SPINE EXAM:  
 
Lumbar spine: No rash, ecchymosis, or gross obliquity. No focal atrophy is noted. ASSESSMENT 
  ICD-10-CM ICD-9-CM 1. HNP (herniated nucleus pulposus), lumbar M51.26 722.10 SCHEDULE SURGERY 2. Lumbar radiculopathy M54.16 724.4 SCHEDULE SURGERY Written by Nawaf Melara, as dictated by Fernando Pederson MD. 
 
I, Dr. Fernando Pederson MD, confirm that all documentation is accurate.

## 2019-10-15 NOTE — PROGRESS NOTES
[facility] [address]  [address], [address]  [place], [address] [place] [address] Street  Phone: [phone]  Fax: [phone]  PROGRESS NOTE  Patient: Leopold Pulley                MRN: [id number]       SSN: [id number]  YOB: 1980        AGE: 44 y.o. SEX: female  Body mass index is 41.3 kg/m². PCP: Brandie Branch MD  10/15/19    Chief Complaint   Patient presents with    Follow-up    Back Pain    Leg Pain     Left       HISTORY OF PRESENT ILLNESS, RADIOGRAPHS, and PLAN:     HISTORY OF PRESENT ILLNESS:  Ms. Marco Antonio Deleon returns today. She was doing very well until about three weeks ago when she had an increase in her activities and flared up her left paracentral back pain with radiculopathy. This was resolved very well about five months ago with a selective block. She would like to know if we could repeat it. We certainly can. She is waiting for her  to return from deployment and is certainly on edge from that. ASSESSMENT/PLAN: We will repeat her left L4-5 selectives, which were so effective for her the last time and see her back again in followup after that. cc: Brandie Branch MD         Past Medical History:   Diagnosis Date    Cancer [facility])     skin at age 8, Thyroid 2005, Endometrial 2012    Chronic back pain     Sciatica of left side     Thyroid disease     hypothyroid       Family History   Problem Relation Age of Onset    No Known Problems Mother     No Known Problems Father        Current Outpatient Medications   Medication Sig Dispense Refill    diazePAM (VALIUM) 5 mg tablet Take 5 mg by mouth every six (6) hours as needed for Anxiety.  lidocaine (LIDODERM) 5 %       IBUPROFEN (ADVIL PO) Take  by mouth daily as needed.  cyclobenzaprine (FLEXERIL) 10 mg tablet Take 1 Tab by mouth three (3) times daily as needed for Muscle Spasm(s). Indications:  MUSCLE SPASM 40 Tab 0    levothyroxine (SYNTHROID) 125 mcg tablet Take 175 mcg by mouth Daily (before breakfast).  celecoxib (CELEBREX) 100 mg capsule Take  by mouth two (2) times a day.  gabapentin (NEURONTIN) 300 mg capsule Take 1 Cap by mouth three (3) times daily. 90 Cap 1    cyclobenzaprine (FLEXERIL) 5 mg tablet       HYDROcodone-acetaminophen (NORCO) 5-325 mg per tablet Take 1 Tab by mouth four (4) times daily as needed for Pain. Max Daily Amount: 4 Tabs.  40 Tab 0       Allergies   Allergen Reactions    Morphine Nausea and Vomiting    Prednisone Unknown (comments)     Leg cramps  Steroid injections ok    Sulfa (Sulfonamide Antibiotics) Hives       Past Surgical History:   Procedure Laterality Date    HX DILATION AND CURETTAGE  6304,9350    x2    HX HYSTERECTOMY      HX LUMBAR LAMINECTOMY  9/17/15    Carlos Rogel HX PARTIAL THYROIDECTOMY         Past Medical History:   Diagnosis Date    Cancer [facility])     skin at age 8, Thyroid , Endometrial     Chronic back pain     Sciatica of left side     Thyroid disease     hypothyroid       Social History     Socioeconomic History    Marital status: UNKNOWN     Spouse name: Not on file    Number of children: Not on file    Years of education: Not on file    Highest education level: Not on file   Occupational History    Not on file   Social Needs    Financial resource strain: Not on file    Food insecurity:     Worry: Not on file     Inability: Not on file    Transportation needs:     Medical: Not on file     Non-medical: Not on file   Tobacco Use    Smoking status: Former Smoker     Last attempt to quit: 2015     Years since quittin.2    Smokeless tobacco: Never Used   Substance and Sexual Activity    Alcohol use: Yes     Comment: socially    Drug use: No    Sexual activity: Not on file   Lifestyle    Physical activity:     Days per week: Not on file     Minutes per session: Not on file    Stress: Not on file   Relationships    Social connections:     Talks on phone: Not on file     Gets together: Not on file     Attends Jain service: Not on file     Active member of club or organization: Not on file     Attends meetings of clubs or organizations: Not on file     Relationship status: Not on file    Intimate partner violence:     Fear of current or ex partner: Not on file     Emotionally abused: Not on file     Physically abused: Not on file     Forced sexual activity: Not on file   Other Topics Concern    Not on file   Social History Narrative    Not on file         REVIEW OF SYSTEMS:   CONSTITUTIONAL SYMPTOMS:  Negative. EYES:  Negative. EARS, NOSE, THROAT AND MOUTH:  Negative. CARDIOVASCULAR:  Negative. RESPIRATORY:  Negative. GENITOURINARY: Per HPI. GASTROINTESTINAL:  Per HPI. INTEGUMENTARY (SKIN AND/OR BREAST):  Negative. MUSCULOSKELETAL: Per HPI.   ENDOCRINE/RHEUMATOLOGIC:  Negative. NEUROLOGICAL:  Per HPI. HEMATOLOGIC/LYMPHATIC:  Negative. ALLERGIC/IMMUNOLOGIC:  Negative. PSYCHIATRIC:  Negative. PHYSICAL EXAMINATION:   Visit Vitals  /86 (BP 1 Location: Left arm, BP Patient Position: Sitting)   Pulse 79   Temp 98.4 °F (36.9 °C) (Oral)   Resp 18   Ht 5' 8\" (1.727 m)   Wt 271 lb 9.6 oz (123.2 kg)   SpO2 99%   BMI 41.30 kg/m²    PAIN SCALE: 5/10    CONSTITUTIONAL: The patient is in no apparent distress and is alert and oriented x 3. HEENT: Normocephalic. Hearing grossly intact. NECK: Supple and symmetric. no tenderness, or masses were felt. RESPIRATORY: No labored breathing. CARDIOVASCULAR: The carotid pulses were normal. Peripheral pulses were 2+. CHEST: Normal AP diameter and normal contour without any kyphoscoliosis. LYMPHATIC: No lymphadenopathy was appreciated in the neck, axillae or groin. SKIN:  Incision healing well, no drainage, no erythema, no hernia, no seroma, no swelling, no dehiscence, incision well approximated. Negative for scars, rashes, lesions, or ulcers on the right upper, right lower, left upper, left lower and trunk.    NEUROLOGICAL: Alert and oriented x 3. Ambulation without assistive device. FWB. EXTREMITIES: See musculoskeletal.  MUSCULOSKELETAL:   Head and Neck:  Negative for misalignment, asymmetry, crepitation, defects, tenderness masses or effusions.  Left Upper Extremity: Inspection, percussion and palpation performed. Lymans sign is negative.  Right Upper Extremity: Inspection, percussion and palpation performed. Lymans sign is negative.  Spine, Ribs and Pelvis: Back and LLE pain. Inspection, percussion and palpation performed. Negative for misalignment, asymmetry, crepitation, defects, tenderness masses or effusions.  Left Lower Extremity: Inspection, percussion and palpation performed. Negative straight leg raise.  Right Lower Extremity: Inspection, percussion and palpation performed. Negative straight leg raise. SPINE EXAM:     Lumbar spine: No rash, ecchymosis, or gross obliquity. No focal atrophy is noted. ASSESSMENT    ICD-10-CM ICD-9-CM    1. HNP (herniated nucleus pulposus), lumbar M51.26 722.10 SCHEDULE SURGERY   2. Lumbar radiculopathy M54.16 724.4 SCHEDULE SURGERY       Written by Yvrose Santana, as dictated by Madalyn Cavanaugh MD.    I, Dr. Madalyn Cavanaugh MD, confirm that all documentation is accurate.

## 2019-10-18 ENCOUNTER — HOSPITAL ENCOUNTER (OUTPATIENT)
Dept: PHYSICAL THERAPY | Age: 39
Discharge: HOME | End: 2019-10-18
Payer: OTHER GOVERNMENT

## 2019-10-18 PROCEDURE — 97110 THERAPEUTIC EXERCISES: CPT

## 2019-10-18 PROCEDURE — 97140 MANUAL THERAPY 1/> REGIONS: CPT

## 2019-10-18 PROCEDURE — 97012 MECHANICAL TRACTION THERAPY: CPT

## 2019-10-18 NOTE — PROGRESS NOTES
PT DAILY TREATMENT NOTE 8-    Patient Name: Dania See  Date:10/18/2019  : 1980  [x]  Patient  Verified  Payor:  / Plan: [address] / Product Type:  /    In time:732 Out time:842  Total Treatment Time (min): 70  Visit #: 2 of 4-8    Treatment Area: Lower back pain [M54.5]    SUBJECTIVE  Pain Level (0-10 scale): 6/10 with walking   Any medication changes, allergies to medications, adverse drug reactions, diagnosis change, or new procedure performed?: [x] No    [] Yes (see summary sheet for update)  Subjective functional status/changes:   [] No changes reported  Patient reports \"I hurt\"    OBJECTIVE  Modality rationale: imrpoved spinal decompression with traction, and decrease myofascial tightness with BEVERLEY machine to improve transfers, gait and daily ADLs    Min Type Additional Details   15 [x]  Traction: [] Cervical       [x]Lumbar                       [] Prone          []Supine                       [x]Intermittent  3 step up  Lbs: 115/50  [] before manual  [x] after manual   10 [x]  Ice     - after traction - NT   Position: semilreclined  Location: L/S, and L HS    NT BEVERLEY - to L HS and LS  With needling    [x] Skin assessment post-treatment:  [x]intact []redness- no adverse reaction       []redness - adverse reaction:       20 min Therapeutic Exercise:  [x] See flow sheet : assess symptoms and treatment plan while in LIZETH, discuss DDS brace    Rationale: increase ROM, increase strength and improve coordination to improve the patients ability to sit, stand, ADLs      25 min Manual Therapy: Technique:   IMT NC   Post needling ischemic compression  Post needling STM all needled muscles    TPR L HS and calf post needling   Applied lidocaine patches for patient      Rationale:      decrease pain, increase ROM, increase tissue extensibility and decrease trigger points to improve patient's ability to sit, ambulate, ADLs    Dry Needling Procedure Note    Dry Needle Session Number:  25    Procedure: An intramuscular manual therapy (dry needling) and a neuro-muscular re-education treatment was done to deactivate myofascial trigger points, with a 15/30 gauge solid filament needle, under aseptic technique. Indication(s): [] Muscle spasms [x] Myalgia/Myositis  [x] Muscle cramps      [x] Muscle imbalances [] TMD (TMJ) [] Myofascial pain & dysfunction     [] Other: __    Chart reviewed for the following:  Veronica JONES PT, have reviewed the medical history, summary list and precautions/contraindications for [employer].     TIME OUT performed immediately prior to start of procedure:  750a (enter time the timeout was completed)  Veronica JONES PT, have performed the following reviews on [employer] prior to the start of the session:      [x] Patient was identified by name and date of birth    [x] Agreement on all muscles being treated was verified   [x] Purpose of dry needling, side effects, possible complications, risks and benefits were explained to the patient   [x] Procedure site(s) verified  [x] Patient was positioned for comfort and draped for privacy  [x] Informed Consent was signed (initial visit) and verified verbally (subsequent visits)  [x] Patient was instructed on the need to report the use of blood thinners and/or immunosuppressant medications  [x] How to respond to possible adverse effects of treatment  [x] Self treatment of post needling soreness: ice, heat (moist heat, heat wraps) and stretching  [x] Opportunity was given to ask any questions, all questions were answered            Treatment:  The following muscles were treated today:    Right:    Left: QL, psoas major, HS, lateral gastroc head      Patients response to todays treatment:   [x]  LTRs  [x]  Muscle Relaxation  [x]  Pain Relief  []  Decreased Tinnitus  []  Decreased HAs []  Post needling soreness [x]  Increased ROM   []  Other:        X with TE min Patient Education: [x] Review HEP - inversion table, DDS brace, surgical option, extension protocol , rotation stretch      Other Objective/Functional Measures:    Rotation decompression : no pain    Posture - L shift /L shoulder depression    Main deficit - prolonged sitting, walking and standing      Pain Level (0-10 scale) post treatment: 4    ASSESSMENT/Changes in Function: Patient saw referring MD last week who scheduled her for cortisone shot in approx 1.5 weeks. Patient is considering surgical options of laminectomy but did not discuss that with surgeon. Patient education monopolized treatment therex . Excellent LTR with needling of QL and improved standing posture post treatment. Patient will continue to benefit from skilled PT services to modify and progress therapeutic interventions, address functional mobility deficits, address ROM deficits, address strength deficits, analyze and address soft tissue restrictions, analyze and cue movement patterns, analyze and modify body mechanics/ergonomics, assess and modify postural abnormalities and instruct in home and community integration to attain remaining goals. []  See Plan of Care  [x]  See progress note/recertification 37/1/52  []  See Discharge Summary         Progress towards goals / Updated goals:  Continued limited progress sec to flare up 10/18/19 - pending cortisone shot    1. Patient will demo 100% bridge with pain less than 3/10 for indication of improved core strength for decreased compression with sitting at work - goal not met sec to elevated pain levels   2. Patient will demo increased L ext strength to  4-/5 for decreased LS strength with car transfers goal NT sec to elevated pain levels   3. Patient will report 50% improvement in stair negotiation for safety in the home.   Goal not met sec to step to step stair negotiation     PLAN  [x]  Upgrade activities as tolerated     []  Continue plan of care  []  Update interventions per flow sheet       []  Discharge due to:_  [x]  Other:cont IMT/ manual to EMILY Melendrez DPT  10/18/2019

## 2019-10-25 ENCOUNTER — HOSPITAL ENCOUNTER (OUTPATIENT)
Dept: PHYSICAL THERAPY | Age: 39
Discharge: HOME | End: 2019-10-25
Payer: OTHER GOVERNMENT

## 2019-10-25 ENCOUNTER — TELEPHONE (OUTPATIENT)
Dept: ORTHOPEDIC SURGERY | Age: 39
End: 2019-10-25

## 2019-10-25 PROCEDURE — 97012 MECHANICAL TRACTION THERAPY: CPT

## 2019-10-25 PROCEDURE — 97140 MANUAL THERAPY 1/> REGIONS: CPT

## 2019-10-25 PROCEDURE — 97110 THERAPEUTIC EXERCISES: CPT

## 2019-10-25 NOTE — TELEPHONE ENCOUNTER
Patient called with questions prior to receiving spinal block on 10/29. Patient wants to know if there is any amount of time that she needs to what to have traction or a massage. Best contact number for patient [phone].

## 2019-10-25 NOTE — PROGRESS NOTES
PT DAILY TREATMENT NOTE 8-    Patient Name: Briseida Juarez  Date:10/25/2019  : 1980  [x]  Patient  Verified  Payor:  / Plan: [facility]  [facility] [place] REGION / Product Type:  /    In time:734 Out time:835  Total Treatment Time (min): 61  Visit #: 3 of 4-8    Treatment Area: Lower back pain [M54.5]    SUBJECTIVE  Pain Level (0-10 scale): 7   Any medication changes, allergies to medications, adverse drug reactions, diagnosis change, or new procedure performed?: [x] No    [] Yes (see summary sheet for update)  Subjective functional status/changes:   [] No changes reported  Patient reports going to ER last week . OBJECTIVE  Modality rationale: imrpoved spinal decompression with traction, and decrease myofascial tightness with BEVERLEY machine to improve transfers, gait and daily ADLs    Min Type Additional Details   15 [x]  Traction: [] Cervical       [x]Lumbar                       [] Prone          []Supine                       [x]Intermittent  3 step up  Lbs: 115/50  [] before manual  [x] after manual   10 [x]  Ice     - after traction - NT   Position: semilreclined  Location: L/S, and L HS    NT BEVERLEY - to L HS and LS  With needling    [x] Skin assessment post-treatment:  [x]intact []redness- no adverse reaction       []redness - adverse reaction:       21 min Therapeutic Exercise:  [x] See flow sheet : assess while on LIZETH    Rationale: increase ROM, increase strength and improve coordination to improve the patients ability to sit, stand, ADLs      15 min Manual Therapy: Technique:   IMT NC   Post needling ischemic compression  Post needling roller to sciatic distribution    Rationale:      decrease pain, increase ROM, increase tissue extensibility and decrease trigger points to improve patient's ability to sit, ambulate, ADLs    Dry Needling Procedure Note    Dry Needle Session Number:  26    Procedure:    An intramuscular manual therapy (dry needling) and a neuro-muscular re-education treatment was done to deactivate myofascial trigger points, with a 15/30 gauge solid filament needle, under aseptic technique. Indication(s): [] Muscle spasms [x] Myalgia/Myositis  [x] Muscle cramps      [x] Muscle imbalances [] TMD (TMJ) [] Myofascial pain & dysfunction     [] Other: __    Chart reviewed for the following:  Kelly JONES PT, have reviewed the medical history, summary list and precautions/contraindications for [employer].     TIME OUT performed immediately prior to start of procedure:  740  (enter time the timeout was completed)  Kelly JONES PT, have performed the following reviews on [employer] prior to the start of the session:      [x] Patient was identified by name and date of birth    [x] Agreement on all muscles being treated was verified   [x] Purpose of dry needling, side effects, possible complications, risks and benefits were explained to the patient   [x] Procedure site(s) verified  [x] Patient was positioned for comfort and draped for privacy  [x] Informed Consent was signed (initial visit) and verified verbally (subsequent visits)  [x] Patient was instructed on the need to report the use of blood thinners and/or immunosuppressant medications  [x] How to respond to possible adverse effects of treatment  [x] Self treatment of post needling soreness: ice, heat (moist heat, heat wraps) and stretching  [x] Opportunity was given to ask any questions, all questions were answered            Treatment:  The following muscles were treated today:    Right:    Left: QL, glut med, glut max, lateral HS, lateral gastroc      Patients response to todays treatment:   [x]  LTRs  [x]  Muscle Relaxation  [x]  Pain Relief  []  Decreased Tinnitus  []  Decreased HAs []  Post needling soreness [x]  Increased ROM   []  Other:        X with TE min Patient Education: [x] Review HEP      Other Objective/Functional Measures:    Progression - press ups with lateral shift    LTG 1 - bridge 50%      Pain Level (0-10 scale) post treatment: 6    ASSESSMENT/Changes in Function: Patient reports continued elevated pain levels with L radicular sx. Patient is still pending cortisone injection for next week. Pain is associated with standing and walking mainly. Sitting / NWBing is allevaiting. Patient had hypersensitivities to needling today indicating sensory involvement. Patient will continue to benefit from skilled PT services to modify and progress therapeutic interventions, address functional mobility deficits, address ROM deficits, address strength deficits, analyze and address soft tissue restrictions, analyze and cue movement patterns, analyze and modify body mechanics/ergonomics, assess and modify postural abnormalities and instruct in home and community integration to attain remaining goals. []  See Plan of Care  [x]  See progress note/recertification 78/4/61  []  See Discharge Summary         Progress towards goals / Updated goals:      1. Patient will demo 100% bridge with pain less than 3/10 for indication of improved core strength for decreased compression with sitting at work - goal not met at 50% 10/25/19  2. Patient will demo increased L ext strength to  4-/5 for decreased LS strength with car transfers goal NT sec to elevated pain levels   3. Patient will report 50% improvement in stair negotiation for safety in the home.   Goal not met sec to step to step stair negotiation     PLAN  [x]  Upgrade activities as tolerated     []  Continue plan of care  []  Update interventions per flow sheet       []  Discharge due to:_  [x]  Other: Patient scheduled through 11/4     Charma Lundborg DPT  10/25/2019

## 2019-10-28 NOTE — TELEPHONE ENCOUNTER
Spoke with patient, informed of the Provider message below. Patient stated understanding, no further actions needed at this time.

## 2019-10-29 ENCOUNTER — APPOINTMENT (OUTPATIENT)
Dept: GENERAL RADIOLOGY | Age: 39
End: 2019-10-29
Attending: PHYSICAL MEDICINE & REHABILITATION
Payer: OTHER GOVERNMENT

## 2019-10-29 ENCOUNTER — HOSPITAL ENCOUNTER (OUTPATIENT)
Age: 39
Setting detail: OUTPATIENT SURGERY
Discharge: HOME | End: 2019-10-29
Attending: PHYSICAL MEDICINE & REHABILITATION | Admitting: PHYSICAL MEDICINE & REHABILITATION
Payer: OTHER GOVERNMENT

## 2019-10-29 VITALS
SYSTOLIC BLOOD PRESSURE: 137 MMHG | DIASTOLIC BLOOD PRESSURE: 94 MMHG | HEART RATE: 90 BPM | TEMPERATURE: 98.5 F | BODY MASS INDEX: 41.07 KG/M2 | RESPIRATION RATE: 20 BRPM | WEIGHT: 271 LBS | HEIGHT: 68 IN | OXYGEN SATURATION: 100 %

## 2019-10-29 PROCEDURE — 74011000250 HC RX REV CODE- 250: Performed by: PHYSICAL MEDICINE & REHABILITATION

## 2019-10-29 PROCEDURE — 74011250637 HC RX REV CODE- 250/637: Performed by: PHYSICAL MEDICINE & REHABILITATION

## 2019-10-29 PROCEDURE — 77030003669 HC NDL SPN COOK -B: Performed by: PHYSICAL MEDICINE & REHABILITATION

## 2019-10-29 PROCEDURE — 74011250636 HC RX REV CODE- 250/636: Performed by: PHYSICAL MEDICINE & REHABILITATION

## 2019-10-29 PROCEDURE — 77030003672 HC NDL SPN HALY -A: Performed by: PHYSICAL MEDICINE & REHABILITATION

## 2019-10-29 PROCEDURE — 74011636320 HC RX REV CODE- 636/320: Performed by: PHYSICAL MEDICINE & REHABILITATION

## 2019-10-29 PROCEDURE — 76010000009 HC PAIN MGT 0 TO 30 MIN PROC: Performed by: PHYSICAL MEDICINE & REHABILITATION

## 2019-10-29 PROCEDURE — 77030039433 HC TY MYLEOGRAM BD -B: Performed by: PHYSICAL MEDICINE & REHABILITATION

## 2019-10-29 RX ORDER — DIAZEPAM 5 MG/1
2.5-1 TABLET ORAL ONCE
Status: COMPLETED | OUTPATIENT
Start: 2019-10-29 | End: 2019-10-29

## 2019-10-29 RX ORDER — DEXAMETHASONE SODIUM PHOSPHATE 100 MG/10ML
INJECTION INTRAMUSCULAR; INTRAVENOUS AS NEEDED
Status: DISCONTINUED | OUTPATIENT
Start: 2019-10-29 | End: 2019-10-29 | Stop reason: HOSPADM

## 2019-10-29 RX ORDER — LIDOCAINE HYDROCHLORIDE 10 MG/ML
INJECTION, SOLUTION EPIDURAL; INFILTRATION; INTRACAUDAL; PERINEURAL AS NEEDED
Status: DISCONTINUED | OUTPATIENT
Start: 2019-10-29 | End: 2019-10-29 | Stop reason: HOSPADM

## 2019-10-29 RX ADMIN — DIAZEPAM 10 MG: 5 TABLET ORAL at 08:20

## 2019-10-29 NOTE — PROCEDURES
SELECTIVE NERVE ROOT BLOCK PROCEDURE NOTE      Patient Name: Merlin Higgins  Date of Procedure: October 29, 2019  Preoperative Diagnosis: Lumbar radiculopathy  Post Operative Diagnosis:  same  [facility]  [facility]    Procedure :    left L4 Selective Nerve Root Block  left L5 Selective Nerve Root Block    Consent:  Informed consent was obtained prior to the procedure. The patient was given the opportunity to ask questions regarding the procedure and its associated risks. In addition to the potential risks associated with the procedure itself, the patient was informed both verbally and in writing of the potential side effects of the use of glucocorticoid. The patient appeared to comprehend the informed consent and desired to have the procedure performed. Procedure: The patient was placed in the prone position on the fluoroscopy table and the back was prepped and draped in the usual sterile manner. The exact spinal level was  identified using fluoroscopy, and Lidocaine 1 % was injected locally, a # 22 gauge spinal needle was passed to the transverse process. The depth was noted and the needle redirected to pass inferior and approximately one cm anterior to the transverse process. YES  1 cc of Isovue M-200 was used to verify positioning in the epidural and paravertebral space and outlined the course of the spinal nerve into the epidural space. The same procedure was repeated at each spinal level indicated above. No vascular uptake was identified. A total of 10 mg of preservative free Dexamethasone and 1 cc of Lidocaine/site was slowly injected. The patient tolerated the procedure well. The injection area was cleaned and bandaids applied. Not excessive bleeding was noted. Patient dressed and discharged to home with instructions. Discussion: The patient tolerated the procedure well. Anurag Johnson MD  October 29, 2019

## 2019-10-29 NOTE — DISCHARGE INSTRUCTIONS
[facility]   (BERTO)  Dr. Deyvi Hinkle, Dr. Owen Borden, Dr. Christopher Forte not drive a car, operate heavy machinery or dangerous equipment for 24 hours. * Activity as tolerated; rest for the remainder of the day. * Resume pre-block medications including those for your family doctor. * Do not drink alcoholic beverages for 24 hours. Alcohol and the medications you have received may interact and cause an adverse reaction. * You may feel better this evening and worse tomorrow, as the numbing medications wears off and the steroid has yet to begin to work. After 48 hrs the steroid should begin to release bringing you relief. * You may shower this evening and remove any bandages. * Avoid hot tubs and heating pads for 24 hours. You may use cold packs on the procedure site as tolerated for the first 24 hours. * If a headache develops, drink plenty of fluids and rest.  Take over the counter medications for headache if needed. If the headache continues longer than 24 hours, call MD at the [address] [place] [address]. [phone]    * Continue taking pain medications as needed. * You may resume your regular diet if tolerated. Otherwise, start with sips of water and advance slowly. * If Diabetic: check your blood sugar three times a day for the next 3 days. If your sugar is greater than 300 call your family doctor. If your sugar is greater than 400, have someone transport you to the nearest Emergency Room. * If you experience any of the following problems, Please Call the [address] [place] [address] at [phone].         * Shortness of Breath    * Fever of 101 or higher    * Nausea / Vomiting    * Severe Headache    * Weakness or numbness in arms or legs that is not      resolving    * Prolonged increase in pain greater than 4 days      DISCHARGE SUMMARY from Nurse      PATIENT INSTRUCTIONS:    After oral sedation, for 24 hours or while taking prescription Narcotics:  · Limit your activities  · Do not drive and operate hazardous machinery  · Do not make important personal or business decisions  · Do  not drink alcoholic beverages  · If you have not urinated within 8 hours after discharge, please contact your surgeon on call. Report the following to your surgeon:  · Excessive pain, swelling, redness or odor of or around the surgical area  · Temperature over 101  · Nausea and vomiting lasting longer than 4 hours or if unable to take medications  · Any signs of decreased circulation or nerve impairment to extremity: change in color, persistent  numbness, tingling, coldness or increase pain  · Any questions            What to do at Home:  Recommended activity: Activity as tolerated, NO DRIVING FOR 12 Hours post injection          *  Please give a list of your current medications to your Primary Care Provider. *  Please update this list whenever your medications are discontinued, doses are      changed, or new medications (including over-the-counter products) are added. *  Please carry medication information at all times in case of emergency situations. These are general instructions for a healthy lifestyle:    No smoking/ No tobacco products/ Avoid exposure to second hand smoke    Surgeon General's Warning:  Quitting smoking now greatly reduces serious risk to your health. Obesity, smoking, and sedentary lifestyle greatly increases your risk for illness    A healthy diet, regular physical exercise & weight monitoring are important for maintaining a healthy lifestyle    You may be retaining fluid if you have a history of heart failure or if you experience any of the following symptoms:  Weight gain of 3 pounds or more overnight or 5 pounds in a week, increased swelling in our hands or feet or shortness of breath while lying flat in bed.   Please call your doctor as soon as you notice any of these symptoms; do not wait until your next office visit. Recognize signs and symptoms of STROKE:    F-face looks uneven    A-arms unable to move or move unevenly    S-speech slurred or non-existent    T-time-call 911 as soon as signs and symptoms begin-DO NOT go       Back to bed or wait to see if you get better-TIME IS BRAIN.

## 2019-10-29 NOTE — INTERVAL H&P NOTE
H&P Update: 
Melia Brown was seen and briefly examined. History and physical has been reviewed.   There have been no significant clinical changes since the completion of the originally dated History and Physical.

## 2019-11-01 ENCOUNTER — HOSPITAL ENCOUNTER (OUTPATIENT)
Dept: PHYSICAL THERAPY | Age: 39
Discharge: HOME | End: 2019-11-01
Payer: OTHER GOVERNMENT

## 2019-11-01 PROCEDURE — 97012 MECHANICAL TRACTION THERAPY: CPT

## 2019-11-01 PROCEDURE — 97140 MANUAL THERAPY 1/> REGIONS: CPT

## 2019-11-01 PROCEDURE — 97110 THERAPEUTIC EXERCISES: CPT

## 2019-11-01 NOTE — PROGRESS NOTES
PT DAILY TREATMENT NOTE 8-14    Patient Name: Will Prudent  Date:2019  : 1980  [x]  Patient  Verified  Payor:  / Plan: [facility] / Product Type:  /    In time:806 Out time:914  Total Treatment Time (min): 68  Visit #: 4 of 4-8    Treatment Area: Lower back pain [M54.5]    SUBJECTIVE  Pain Level (0-10 scale): 6  Any medication changes, allergies to medications, adverse drug reactions, diagnosis change, or new procedure performed?: [x] No    [] Yes (see summary sheet for update)  Subjective functional status/changes:   [] No changes reported  Patient reports having cortisone shot on Tuesday and feeling relief in the spine, but nerve is still inflamed     OBJECTIVE  Modality rationale: imrpoved spinal decompression with traction, and decrease myofascial tightness with BEVERLEY machine to improve transfers, gait and daily ADLs    Min Type Additional Details   15 [x]  Traction: [] Cervical       [x]Lumbar                       [] Prone          []Supine                       [x]Intermittent  3 step up  Lbs: 120/50  [] before manual  [x] after manual   10 [x]  Ice     - after traction - NT   Position: semilreclined  Location: L/S, and L HS    NT BEVERLEY - to L HS and LS  With needling    [x] Skin assessment post-treatment:  [x]intact []redness- no adverse reaction       []redness - adverse reaction:       28 min Therapeutic Exercise:  [x] See flow sheet : assess while on LIEZTH , added per FS    Rationale: increase ROM, increase strength and improve coordination to improve the patients ability to sit, stand, ADLs      15 min Manual Therapy: Technique:   IMT NC   Post needling ischemic compression  Post needling roller to sciatic distribution   Taping to inhibit lteral HS and latera GSC    Rationale:      decrease pain, increase ROM, increase tissue extensibility and decrease trigger points to improve patient's ability to sit, ambulate, ADLs    Dry Needling Procedure Note    Dry Needle Session Number:  27    Procedure: An intramuscular manual therapy (dry needling) and a neuro-muscular re-education treatment was done to deactivate myofascial trigger points, with a 15/30 gauge solid filament needle, under aseptic technique. Indication(s): [] Muscle spasms [x] Myalgia/Myositis  [x] Muscle cramps      [x] Muscle imbalances [] TMD (TMJ) [] Myofascial pain & dysfunction     [] Other: __    Chart reviewed for the following:  IPorfirio PT, have reviewed the medical history, summary list and precautions/contraindications for [employer].     TIME OUT performed immediately prior to start of procedure:  806a (enter time the timeout was completed)  Porfirio JONES PT, have performed the following reviews on [employer] prior to the start of the session:      [x] Patient was identified by name and date of birth    [x] Agreement on all muscles being treated was verified   [x] Purpose of dry needling, side effects, possible complications, risks and benefits were explained to the patient   [x] Procedure site(s) verified  [x] Patient was positioned for comfort and draped for privacy  [x] Informed Consent was signed (initial visit) and verified verbally (subsequent visits)  [x] Patient was instructed on the need to report the use of blood thinners and/or immunosuppressant medications  [x] How to respond to possible adverse effects of treatment  [x] Self treatment of post needling soreness: ice, heat (moist heat, heat wraps) and stretching  [x] Opportunity was given to ask any questions, all questions were answered            Treatment:  The following muscles were treated today:    Right:    Left: Lateral soleus, gastroc, hamstring, glut max and glut med      Patients response to todays treatment:   [x]  LTRs  [x]  Muscle Relaxation  [x]  Pain Relief  []  Decreased Tinnitus  []  Decreased HAs []  Post needling soreness [x]  Increased ROM   []  Other:        X with TE min Patient Education: [x] Review HEP - added seated n glide and standing TA      Other Objective/Functional Measures:    Primary co - m cramping in L LE and ankle pain    Sciatic n glide - moderate tension of posterior chain      Pain Level (0-10 scale) post treatment: 5    ASSESSMENT/Changes in Function: Patient is concerned with getting on airplane next week for travel sec to radicular sx. Symptoms exacerbated with standing and walking indicating poor core support . Discussed role of core with patient and encouraged standing TA with GS throughout the day. Large LTR of lateral distal HS today . Increased traction pull 5# to continue decompression and promote spine unloading     Patient will continue to benefit from skilled PT services to modify and progress therapeutic interventions, address functional mobility deficits, address ROM deficits, address strength deficits, analyze and address soft tissue restrictions, analyze and cue movement patterns, analyze and modify body mechanics/ergonomics, assess and modify postural abnormalities and instruct in home and community integration to attain remaining goals. []  See Plan of Care  [x]  See progress note/recertification 70/2/13  []  See Discharge Summary         Progress towards goals / Updated goals:   SLOW PROGRESS TO ALL GOALS AT THIS TIME - 11/1/19 - will formally assess all goals NV for last scheduled apt     1. Patient will demo 100% bridge with pain less than 3/10 for indication of improved core strength for decreased compression with sitting at work - goal not met at 50% 10/25/19  2. Patient will demo increased L ext strength to  4-/5 for decreased LS strength with car transfers goal NT sec to elevated pain levels    3. Patient will report 50% improvement in stair negotiation for safety in the home.   Goal not met sec to step to step stair negotiation  - no change as of 11/1/19    PLAN  [x]  Upgrade activities as tolerated     []  Continue plan of care  [] Update interventions per flow sheet       []  Discharge due to:_  [x]  Other: Last scheduled apt is on Monday - reassessment due at that time.      Andrew Vasques DPT  11/1/2019

## 2019-11-04 ENCOUNTER — HOSPITAL ENCOUNTER (OUTPATIENT)
Dept: PHYSICAL THERAPY | Age: 39
Discharge: HOME | End: 2019-11-04
Payer: OTHER GOVERNMENT

## 2019-11-04 PROCEDURE — 97140 MANUAL THERAPY 1/> REGIONS: CPT

## 2019-11-04 PROCEDURE — 97012 MECHANICAL TRACTION THERAPY: CPT

## 2019-11-04 NOTE — PROGRESS NOTES
PT DAILY TREATMENT NOTE 8-    Patient Name: Christin Shah  Date:2019  : 1980  [x]  Patient  Verified  Payor:  / Plan: [facility] REGION / Product Type: [employer] /    In time:704 Out time:805  Total Treatment Time (min): 61  Visit #: 5 of 4-8    Treatment Area: Lower back pain [M54.5]    SUBJECTIVE  Pain Level (0-10 scale): 6  Any medication changes, allergies to medications, adverse drug reactions, diagnosis change, or new procedure performed?: [x] No    [] Yes (see summary sheet for update)  Subjective functional status/changes:   [] No changes reported  Patient reports anxiety after having to take a plane trip tomorrow and having increased pain.      OBJECTIVE  Modality rationale: imrpoved spinal decompression with traction, and decrease myofascial tightness with BEVERLEY machine to improve transfers, gait and daily ADLs    Min Type Additional Details   15 [x]  Traction: [] Cervical       [x]Lumbar                       [] Prone          []Supine                       [x]Intermittent  3 step up  Lbs: 120/50  [] before manual  [x] after manual   10 [x]  Ice     - after traction - NT   Position: semilreclined  Location: L/S, and L HS    NT BEVERLEY - to L HS and LS  With needling    [x] Skin assessment post-treatment:  [x]intact []redness- no adverse reaction       []redness - adverse reaction:       0 min Therapeutic Exercise:  [x] See flow sheet : REASSESS FOR PN    Rationale: increase ROM, increase strength and improve coordination to improve the patients ability to sit, stand, ADLs      36 min Manual Therapy: Technique:   Reassess for pN   IMT NC   Post needling ischemic compression  Post needling roller to sciatic distribution   Manual hip stretch in prone    Rationale:      decrease pain, increase ROM, increase tissue extensibility and decrease trigger points to improve patient's ability to sit, ambulate, ADLs    Dry Needling Procedure Note    Dry Needle Session Number: 28    Procedure: An intramuscular manual therapy (dry needling) and a neuro-muscular re-education treatment was done to deactivate myofascial trigger points, with a 15/30 gauge solid filament needle, under aseptic technique. Indication(s): [] Muscle spasms [x] Myalgia/Myositis  [x] Muscle cramps      [x] Muscle imbalances [] TMD (TMJ) [] Myofascial pain & dysfunction     [] Other: __    Chart reviewed for the following:  Beth JONES PT, have reviewed the medical history, summary list and precautions/contraindications for [employer].     TIME OUT performed immediately prior to start of procedure:  704(enter time the timeout was completed)  Beth JONES PT, have performed the following reviews on [employer] prior to the start of the session:      [x] Patient was identified by name and date of birth    [x] Agreement on all muscles being treated was verified   [x] Purpose of dry needling, side effects, possible complications, risks and benefits were explained to the patient   [x] Procedure site(s) verified  [x] Patient was positioned for comfort and draped for privacy  [x] Informed Consent was signed (initial visit) and verified verbally (subsequent visits)  [x] Patient was instructed on the need to report the use of blood thinners and/or immunosuppressant medications  [x] How to respond to possible adverse effects of treatment  [x] Self treatment of post needling soreness: ice, heat (moist heat, heat wraps) and stretching  [x] Opportunity was given to ask any questions, all questions were answered            Treatment:  The following muscles were treated today:    Right:    Left: Lateral soleus, gastroc, hamstring, glut max and glut med, piriformis      Patients response to todays treatment:   [x]  LTRs  [x]  Muscle Relaxation  [x]  Pain Relief  []  Decreased Tinnitus  []  Decreased HAs []  Post needling soreness [x]  Increased ROM   []  Other:        X with TE min Patient Education: [x] Review HEP     Other Objective/Functional Measures:    Goals assessed for PN   Pain at best 2 at rest / laying supine , at worst 9/10  Subjective % improvement 50%  Objective:    LS AROM : WFL all directions - posterior chain tightness. Core : poor    Bridge 75% - co cramping    Prone LS extension 3/5   Neural tension + L    Gait - antalgic     Pain Level (0-10 scale) post treatment: 4    ASSESSMENT/Changes in Function:  SEE PN . Patient got inversion table. Patient will continue to benefit from skilled PT services to modify and progress therapeutic interventions, address functional mobility deficits, address ROM deficits, address strength deficits, analyze and address soft tissue restrictions, analyze and cue movement patterns, analyze and modify body mechanics/ergonomics, assess and modify postural abnormalities and instruct in home and community integration to attain remaining goals. []  See Plan of Care  [x]  See progress note/recertification 56/2/68  []  See Discharge Summary         Progress towards goals / Updated goals:       1. Patient will demo 100% bridge with pain less than 3/10 for indication of improved core strength for decreased compression with sitting at work - goal not met at 75%  2. Patient will demo increased L ext strength to  4-/5 for decreased LS strength with car transfers goal not met - 3/5  3. Patient will report 50% improvement in stair negotiation for safety in the home. Goal met at 50%    PLAN  [x]  Upgrade activities as tolerated     []  Continue plan of care  []  Update interventions per flow sheet       []  Discharge due to:_  [x]  Other: cont 1x per week till end of Nov - patient to FU with surgeon .      Piedad Lombardi DPT  11/4/2019

## 2019-11-04 NOTE — PROGRESS NOTES
[address] [facility] [address] [facility] AT [address]. [place] [address] [place] [place]  Phone: [phone] Fax: [phone]  PROGRESS NOTE  Patient Name: Britany Diggs : 1980   Treatment/Medical Diagnosis: Lower back pain [M54.5]   Referral Source: Jenny Sung MD     Date of Initial Visit: 19 Attended Visits: 19 Missed Visits: 1     SUMMARY OF TREATMENT    Pt is a 45 y. o. year old female who presents with co LS pain with L LE radiculopathy and tightness/muscle spasms and numbness of posterior chain starting with insidious onset late April. Hx LS discectomy in . Treatment has included IMT, therex and traction. CURRENT STATUS  Patient has made min progress sec last PN sec to re-excaerbation of pain from unknown causes. Patient has since had cortisone injection last Tuesday with min relief. Patient is considering surgical options at this time and has FU apt with surgeon on 12/10. . Combination treatment of IMT/ dry needling , HEP and traction has added in keeping patient functional, but does not eliminate pain completely. Pain meds are becoming ineffective. Other assessment as follows:  Pain at best 2 at rest / laying supine , at worst 9/10  Subjective % improvement 50%  Objective:    LS AROM : WFL all directions - posterior chain tightness. Core : poor    Bridge 75% - co cramping    Prone LS extension 3/5   Neural tension + L    Gait - antalgic     1. Patient will demo 100% bridge with pain less than 3/10 for indication of improved core strength for decreased compression with sitting at work - goal not met at 75%  2. Patient will demo increased L ext strength to  4-/5 for decreased LS strength with car transfers goal not met - 3/5  3. Patient will report 50% improvement in stair negotiation for safety in the home.   Goal met at 50%      New Goals to be achieved in __3-4_  treatments:  Cont per unment goals as above     RECOMMENDATIONS  Patient would benefit from continuation of skilled PT to address above deficits and progress to increased activity tolerance. Cont 1x per week till end ov Nov as auth by insurance   If you have any questions/comments please contact us directly at [phone]   Thank you for allowing us to assist in the care of your patient. Therapist Signature: Ruben Castro, PT Date: 11/4/2019     Time: 740a   NOTE TO PHYSICIAN:  PLEASE COMPLETE THE ORDERS BELOW AND FAX TO   [facility]: [phone]. If you are unable to process this request in 24 hours please contact our office: [phone].  ___ I have read the above report and request that my patient continue as recommended.   ___ I have read the above report and request that my patient continue therapy with the following changes/special instructions:_________________________________________________________   ___ I have read the above report and request that my patient be discharged from therapy.      Physician Signature:        Date:       Time:

## 2019-11-13 ENCOUNTER — HOSPITAL ENCOUNTER (OUTPATIENT)
Dept: PHYSICAL THERAPY | Age: 39
Discharge: HOME | End: 2019-11-13
Payer: OTHER GOVERNMENT

## 2019-11-13 PROCEDURE — 97140 MANUAL THERAPY 1/> REGIONS: CPT

## 2019-11-13 PROCEDURE — 97012 MECHANICAL TRACTION THERAPY: CPT

## 2019-11-13 PROCEDURE — 97110 THERAPEUTIC EXERCISES: CPT

## 2019-11-13 NOTE — PROGRESS NOTES
PT DAILY TREATMENT NOTE     Patient Name: Cindy Cade  Date:2019  : 1980  [x]  Patient  Verified  Payor:  / Plan: [facility]  [facility] REGION / Product Type:  /    In time:707 Out time:815  Total Treatment Time (min): 68  Visit #: 1 of -8    Treatment Area: Lower back pain [M54.5]    SUBJECTIVE  Pain Level (0-10 scale): 7  Any medication changes, allergies to medications, adverse drug reactions, diagnosis change, or new procedure performed?: [x] No    [] Yes (see summary sheet for update)  Subjective functional status/changes:   [] No changes reported  Patient reports pain sec to travel     OBJECTIVE  Modality rationale: imrpoved spinal decompression with traction, and decrease myofascial tightness with BEVERLEY machine to improve transfers, gait and daily ADLs    Min Type Additional Details   15 [x]  Traction: [] Cervical       [x]Lumbar                       [] Prone          []Supine                       [x]Intermittent  3 step up  Lbs: 120/50  [] before manual  [x] after manual   10 [x]  Ice     - after traction - NT   Position: semilreclined  Location: L/S, and L HS    NT BEVERLEY - to L HS and LS  With needling    [x] Skin assessment post-treatment:  [x]intact []redness- no adverse reaction       []redness - adverse reaction:       28 min Therapeutic Exercise:  [x] See flow sheet : focus on stretching , assess sx while in LIZ[place]    Rationale: increase ROM, increase strength and improve coordination to improve the patients ability to sit, stand, ADLs      15 min Manual Therapy: Technique:   IMT NC   Post needling ischemic compression  Post needling roller to sciatic distribution    Rationale:      decrease pain, increase ROM, increase tissue extensibility and decrease trigger points to improve patient's ability to sit, ambulate, ADLs    Dry Needling Procedure Note    Dry Needle Session Number:  29    Procedure:    An intramuscular manual therapy (dry needling) and a neuro-muscular re-education treatment was done to deactivate myofascial trigger points, with a 15/30 gauge solid filament needle, under aseptic technique. Indication(s): [] Muscle spasms [x] Myalgia/Myositis  [x] Muscle cramps      [x] Muscle imbalances [] TMD (TMJ) [] Myofascial pain & dysfunction     [] Other: __    Chart reviewed for the following:  IJuan PT, have reviewed the medical history, summary list and precautions/contraindications for [employer].     TIME OUT performed immediately prior to start of procedure:  696J (enter time the timeout was completed)  Juan JONES PT, have performed the following reviews on [employer] prior to the start of the session:      [x] Patient was identified by name and date of birth    [x] Agreement on all muscles being treated was verified   [x] Purpose of dry needling, side effects, possible complications, risks and benefits were explained to the patient   [x] Procedure site(s) verified  [x] Patient was positioned for comfort and draped for privacy  [x] Informed Consent was signed (initial visit) and verified verbally (subsequent visits)  [x] Patient was instructed on the need to report the use of blood thinners and/or immunosuppressant medications  [x] How to respond to possible adverse effects of treatment  [x] Self treatment of post needling soreness: ice, heat (moist heat, heat wraps) and stretching  [x] Opportunity was given to ask any questions, all questions were answered            Treatment:  The following muscles were treated today:    Right:    Left: Calf and HS - mid mm belly     Patients response to todays treatment:   [x]  LTRs  [x]  Muscle Relaxation  [x]  Pain Relief  []  Decreased Tinnitus  []  Decreased HAs []  Post needling soreness [x]  Increased ROM   []  Other:        X with TE min Patient Education: [x] Review HEP     Other Objective/Functional Measures:    Gait - shortened step length   Deficits - 15-20 min sitting tolerance     Pain Level (0-10 scale) post treatment: 5    ASSESSMENT/Changes in Function: Patient reports elevated pain levels during flight to [place] requiring pain medication and use of rollator for prolonged walking . Use of inversion table not initiated sec to being out of town last week . Patient is trying ot schedule apt with surgeon regarding laminectomy starting beginning of Dec. Focus therex on flexibility to address chronic sitting whole traveling. Patient will continue to benefit from skilled PT services to modify and progress therapeutic interventions, address functional mobility deficits, address ROM deficits, address strength deficits, analyze and address soft tissue restrictions, analyze and cue movement patterns, analyze and modify body mechanics/ergonomics, assess and modify postural abnormalities and instruct in home and community integration to attain remaining goals. []  See Plan of Care  [x]  See progress note/recertification 81/2/05  []  See Discharge Summary         Progress towards goals / Updated goals: All goals reviewed and progressing appropriately as of 11/13/2019 - slow progress       1. Patient will demo 100% bridge with pain less than 3/10 for indication of improved core strength for decreased compression with sitting at work - goal not met at 75%  2. Patient will demo increased L ext strength to  4-/5 for decreased LS strength with car transfers goal not met - 3/5  3. Patient will report 50% improvement in stair negotiation for safety in the home.   Goal met at 50%    PLAN  [x]  Upgrade activities as tolerated     []  Continue plan of care  []  Update interventions per flow sheet       []  Discharge due to:_  [x]  Other: continue stretching program     Rosy Rodriguez DPT  11/13/2019

## 2019-11-20 ENCOUNTER — HOSPITAL ENCOUNTER (OUTPATIENT)
Dept: PHYSICAL THERAPY | Age: 39
Discharge: HOME | End: 2019-11-20
Payer: OTHER GOVERNMENT

## 2019-11-20 PROCEDURE — 97110 THERAPEUTIC EXERCISES: CPT

## 2019-11-20 PROCEDURE — 97140 MANUAL THERAPY 1/> REGIONS: CPT

## 2019-11-20 PROCEDURE — 97012 MECHANICAL TRACTION THERAPY: CPT

## 2019-11-20 NOTE — PROGRESS NOTES
PT DAILY TREATMENT NOTE 8-    Patient Name: Yas Castro  Date:2019  : 1980  [x]  Patient  Verified  Payor:  / Plan: [facility] REGION / Product Type:  /    In time:810 Out time:925  Total Treatment Time (min): 75  Visit #: 1 of 4-8    Treatment Area: Lower back pain [M54.5]    SUBJECTIVE  Pain Level (0-10 scale): 7  Any medication changes, allergies to medications, adverse drug reactions, diagnosis change, or new procedure performed?: [x] No    [] Yes (see summary sheet for update)  Subjective functional status/changes:   [] No changes reported  Patient reports continued elevated pain levels  Patient 10 min late to apt   . OBJECTIVE  Modality rationale: imrpoved spinal decompression with traction, and decrease myofascial tightness with BEVERLEY machine to improve transfers, gait and daily ADLs    Min Type Additional Details   15 [x]  Traction: [] Cervical       [x]Lumbar                       [] Prone          []Supine                       [x]Intermittent  3 step up  Lbs: 130/50  [] before manual  [x] after manual   10 [x]  Ice     - after traction - NT   Position: semilreclined  Location: L/S, and L HS    NT BEVERLEY - to L HS and LS  With needling    [x] Skin assessment post-treatment:  [x]intact []redness- no adverse reaction       []redness - adverse reaction:       35 min Therapeutic Exercise:  [x] See flow sheet : focus on stretching   Rationale: increase ROM, increase strength and improve coordination to improve the patients ability to sit, stand, ADLs      15 min Manual Therapy: Technique:   IMT NC   Post needling ischemic compression  \"Million dollar roll\" grade 2-3 stretch  Prone PA mobs grade 2-3 to LS    Rationale:      decrease pain, increase ROM, increase tissue extensibility and decrease trigger points to improve patient's ability to sit, ambulate, ADLs    Dry Needling Procedure Note    Dry Needle Session Number:  30    Procedure:    An intramuscular manual therapy (dry needling) and a neuro-muscular re-education treatment was done to deactivate myofascial trigger points, with a 15/30 gauge solid filament needle, under aseptic technique. Indication(s): [] Muscle spasms [x] Myalgia/Myositis  [x] Muscle cramps      [x] Muscle imbalances [] TMD (TMJ) [] Myofascial pain & dysfunction     [] Other: __    Chart reviewed for the following:  ITeo, PT, have reviewed the medical history, summary list and precautions/contraindications for [employer].     TIME OUT performed immediately prior to start of procedure:  810am(enter time the timeout was completed)  Teo JONES, PT, have performed the following reviews on [employer] prior to the start of the session:      [x] Patient was identified by name and date of birth    [x] Agreement on all muscles being treated was verified   [x] Purpose of dry needling, side effects, possible complications, risks and benefits were explained to the patient   [x] Procedure site(s) verified  [x] Patient was positioned for comfort and draped for privacy  [x] Informed Consent was signed (initial visit) and verified verbally (subsequent visits)  [x] Patient was instructed on the need to report the use of blood thinners and/or immunosuppressant medications  [x] How to respond to possible adverse effects of treatment  [x] Self treatment of post needling soreness: ice, heat (moist heat, heat wraps) and stretching  [x] Opportunity was given to ask any questions, all questions were answered            Treatment:  The following muscles were treated today:    Right:    Left: Medial HS and hip ADD, L QL, L piriformis, L glut med     Patients response to todays treatment:   [x]  LTRs  [x]  Muscle Relaxation  [x]  Pain Relief  []  Decreased Tinnitus  []  Decreased HAs []  Post needling soreness [x]  Increased ROM   []  Other:        X with TE min Patient Education: [x] Review HEP     Other Objective/Functional Measures:    Gait - improved stride length but definite antalgic - decreased WBing L    Deficit - increased med use, prolonged sitting at work, prolonged standing for cooking     Pain Level (0-10 scale) post treatment: 6    ASSESSMENT/Changes in Function: Patient demo excellent LTR to lateral hip m today. Tenderness to grade 2-3 LS PA mobs. Able to achieve 1 x cavitation. + neural tension. . Increased traction pull again to further decompress spine. Patient reports pain relief with IMT and traction that lasts about 1/2 day until prolonged sitting at work. Encourage use of inversion table at home. Patient will continue to benefit from skilled PT services to modify and progress therapeutic interventions, address functional mobility deficits, address ROM deficits, address strength deficits, analyze and address soft tissue restrictions, analyze and cue movement patterns, analyze and modify body mechanics/ergonomics, assess and modify postural abnormalities and instruct in home and community integration to attain remaining goals. []  See Plan of Care  [x]  See progress note/recertification 14/3/61  []  See Discharge Summary         Progress towards goals / Updated goals:        1. Patient will demo 100% bridge with pain less than 3/10 for indication of improved core strength for decreased compression with sitting at work - goal not met at 75%  2. Patient will demo increased L ext strength to  4-/5 for decreased LS strength with car transfers goal not met - 3/5,unable to progress goal sec to elevated pain levels and attempting to manage those 11/20/19  3. Patient will report 50% improvement in stair negotiation for safety in the home.   Goal met at 50%    PLAN  [x]  Upgrade activities as tolerated     []  Continue plan of care  []  Update interventions per flow sheet       []  Discharge due to:_  [x]  Other: continue stretching program     Abbey Saul DPT  11/20/2019

## 2019-11-27 ENCOUNTER — HOSPITAL ENCOUNTER (OUTPATIENT)
Dept: PHYSICAL THERAPY | Age: 39
Discharge: HOME | End: 2019-11-27
Payer: OTHER GOVERNMENT

## 2019-11-27 PROCEDURE — 97140 MANUAL THERAPY 1/> REGIONS: CPT

## 2019-11-27 PROCEDURE — 97012 MECHANICAL TRACTION THERAPY: CPT

## 2019-11-27 PROCEDURE — 97110 THERAPEUTIC EXERCISES: CPT

## 2019-11-27 NOTE — PROGRESS NOTES
[address] [facility] [address] [facility] AT [address] [place] [address] [place], [place]  Phone: [phone] Fax: [phone]  PROGRESS NOTE  Patient Name: Cuauhtemoc Obregon : 1980   Treatment/Medical Diagnosis: Lower back pain [M54.5]   Referral Source: Satya Dent MD     Date of Initial Visit: 19 Attended Visits: 22 Missed Visits: 1     SUMMARY OF TREATMENT    Pt is a 45 y. o. year old female who presents with co LS pain with L LE radiculopathy and tightness/muscle spasms and numbness of posterior chain starting with insidious onset late April. Hx LS discectomy in 2017. Treatment has included IMT, therex and traction. CURRENT STATUS  Patient has slight improvement in pain today, but overall continues to have L radicular sx and L LE weakness. Recent injection did minimal.  Patient reports she has left multiple messages for MD regarding further treatment strategies. Patient reports combo treatment of IMT and traction keeps pain from being unbearable, but does not eliminate pain. Bridge : 25 % with pain    Hip extension R : 5/5, L 4-/5  Deficits: gait, sleep intolerance , standing tolerance 2-3 min   PRE progression - hip strengthening          Progress towards goals / Updated goals:        1. Patient will demo 100% bridge with pain less than 3/10 for indication of improved core strength for decreased compression with sitting at work - goal notmet - bridge AROM 25% limited by pain   2. Patient will demo increased L ext strength to  4-/5 for decreased LS strength with car transfers goal met at 4-/5   3. Patient will report 50% improvement in stair negotiation for safety in the home. Goal met at 50%    New Goals to be achieved in __3-4_  treatments:  Cont per unment goals as above     RECOMMENDATIONS  Patient would benefit from continuation of skilled PT to address above deficits and progress to increased activity tolerance. Cont 1x per week.  Please return patients phone call .     If you have any questions/comments please contact us directly at [phone]   Thank you for allowing us to assist in the care of your patient. Therapist Signature: Oral Tiwari PT Date: 11/27/2019     Time: 853u   NOTE TO PHYSICIAN:  PLEASE COMPLETE THE ORDERS BELOW AND FAX TO   [facility]: [phone]. If you are unable to process this request in 24 hours please contact our office: [phone].  ___ I have read the above report and request that my patient continue as recommended.   ___ I have read the above report and request that my patient continue therapy with the following changes/special instructions:_________________________________________________________   ___ I have read the above report and request that my patient be discharged from therapy.      Physician Signature:        Date:       Time:

## 2019-11-27 NOTE — PROGRESS NOTES
PT DAILY TREATMENT NOTE     Patient Name: Leon Carlson  Date:2019  : 1980  [x]  Patient  Verified  Payor:  / Plan: [facility]  [facility] REGION / Product Type:  /    In Helen Apo time:910  Total Treatment Time (min): 94  Visit #: 3 of 4-8    Treatment Area: Lower back pain [M54.5]    SUBJECTIVE  Pain Level (0-10 scale): 4/10  Any medication changes, allergies to medications, adverse drug reactions, diagnosis change, or new procedure performed?: [x] No    [] Yes (see summary sheet for update)  Subjective functional status/changes:   [] No changes reported  Patient reports sleeping a little better last night, but has overall has had a hard time sleeping. .     OBJECTIVE  Modality rationale: imrpoved spinal decompression with traction, and decrease myofascial tightness with BEVERLEY machine to improve transfers, gait and daily ADLs    Min Type Additional Details   15 [x]  Traction: [] Cervical       [x]Lumbar                       [] Prone          []Supine                       [x]Intermittent  3 step up  Lbs: 130/50  [] before manual  [x] after manual   10 [x]  Ice     - after traction - NT   Position: semilreclined  Location: L/S, and L HS    NT BEVERLEY - to L HS and LS  With needling    [x] Skin assessment post-treatment:  [x]intact []redness- no adverse reaction       []redness - adverse reaction:       39 min Therapeutic Exercise:  [x] See flow sheet :    Rationale: increase ROM, increase strength and improve coordination to improve the patients ability to sit, stand, ADLs      30 min Manual Therapy: Technique:   IMT NC   Post needling ischemic compression  Post needling rolling and STM    Rationale:      decrease pain, increase ROM, increase tissue extensibility and decrease trigger points to improve patient's ability to sit, ambulate, ADLs    Dry Needling Procedure Note    Dry Needle Session Number:  31    Procedure:    An intramuscular manual therapy (dry needling) and a neuro-muscular re-education treatment was done to deactivate myofascial trigger points, with a 15/30 gauge solid filament needle, under aseptic technique. Indication(s): [] Muscle spasms [x] Myalgia/Myositis  [x] Muscle cramps      [x] Muscle imbalances [] TMD (TMJ) [] Myofascial pain & dysfunction     [] Other: __    Chart reviewed for the following:  IHamzah PT, have reviewed the medical history, summary list and precautions/contraindications for [employer].      TIME OUT performed immediately prior to start of procedure:  815a(enter time the timeout was completed)  Hamzah JONES PT, have performed the following reviews on [employer] prior to the start of the session:      [x] Patient was identified by name and date of birth    [x] Agreement on all muscles being treated was verified   [x] Purpose of dry needling, side effects, possible complications, risks and benefits were explained to the patient   [x] Procedure site(s) verified  [x] Patient was positioned for comfort and draped for privacy  [x] Informed Consent was signed (initial visit) and verified verbally (subsequent visits)  [x] Patient was instructed on the need to report the use of blood thinners and/or immunosuppressant medications  [x] How to respond to possible adverse effects of treatment  [x] Self treatment of post needling soreness: ice, heat (moist heat, heat wraps) and stretching  [x] Opportunity was given to ask any questions, all questions were answered            Treatment:  The following muscles were treated today:    Right:    Left: IN SL - L QL, TS and LS paraspinals, hip ADD multiple locations, gastroc , popliteus      Patients response to todays treatment:   [x]  LTRs  [x]  Muscle Relaxation  [x]  Pain Relief  []  Decreased Tinnitus  []  Decreased HAs []  Post needling soreness [x]  Increased ROM   []  Other:        X with TE min Patient Education: [x] Review HEP     Other Objective/Functional Measures:    Bridge : 25 % with pain    Hip extension R : 5/5, L 4-/5  Deficits: gait, sleep intolerance , standing tolerance 2-3 min   PRE progression - hip strengthening     Pain Level (0-10 scale) post treatment: 3    ASSESSMENT/Changes in Function: Patient reports attempting to contact Dr Kelli Barker in regards to possible surgery. Patient was reassessed by [facility] who recommend 2x per week sessions. Patient will continue to benefit from skilled PT services to modify and progress therapeutic interventions, address functional mobility deficits, address ROM deficits, address strength deficits, analyze and address soft tissue restrictions, analyze and cue movement patterns, analyze and modify body mechanics/ergonomics, assess and modify postural abnormalities and instruct in home and community integration to attain remaining goals. []  See Plan of Care  [x]  See progress note/recertification 48/6/74  []  See Discharge Summary         Progress towards goals / Updated goals:        1. Patient will demo 100% bridge with pain less than 3/10 for indication of improved core strength for decreased compression with sitting at work - goal notmet - bridge AROM 25% limited by pain   2. Patient will demo increased L ext strength to  4-/5 for decreased LS strength with car transfers goal met at 4-/5   3. Patient will report 50% improvement in stair negotiation for safety in the home.   Goal met at 50%    PLAN  [x]  Upgrade activities as tolerated     []  Continue plan of care  []  Update interventions per flow sheet       []  Discharge due to:_  [x]  Other:     Neno Lopez DPT  11/27/2019      Future Appointments   Date Time Provider Lise Jaime   11/27/2019  7:30 AM Emigdio Barbosa, PT [place] [id number]   12/10/2019  8:10 AM Radha Baker, [place] [id number]

## 2019-12-10 ENCOUNTER — OFFICE VISIT (OUTPATIENT)
Dept: ORTHOPEDIC SURGERY | Age: 39
End: 2019-12-10

## 2019-12-10 VITALS
BODY MASS INDEX: 41.21 KG/M2 | SYSTOLIC BLOOD PRESSURE: 159 MMHG | HEIGHT: 68 IN | DIASTOLIC BLOOD PRESSURE: 100 MMHG | RESPIRATION RATE: 16 BRPM | HEART RATE: 83 BPM | TEMPERATURE: 98.3 F

## 2019-12-10 DIAGNOSIS — M54.16 LUMBAR RADICULOPATHY: ICD-10-CM

## 2019-12-10 DIAGNOSIS — M51.26 HNP (HERNIATED NUCLEUS PULPOSUS), LUMBAR: Primary | ICD-10-CM

## 2019-12-10 RX ORDER — TOPIRAMATE 25 MG/1
25-50 TABLET ORAL 2 TIMES DAILY WITH MEALS
Qty: 120 TAB | Refills: 1 | Status: SHIPPED | OUTPATIENT
Start: 2019-12-10 | End: 2019-12-10

## 2019-12-10 RX ORDER — TOPIRAMATE 25 MG/1
25-50 TABLET ORAL 2 TIMES DAILY WITH MEALS
Qty: 120 TAB | Refills: 1 | Status: SHIPPED | OUTPATIENT
Start: 2019-12-10 | End: 2019-12-17

## 2019-12-10 RX ORDER — TIZANIDINE 2 MG/1
2-4 TABLET ORAL
Qty: 45 TAB | Refills: 1 | Status: SHIPPED | OUTPATIENT
Start: 2019-12-10 | End: 2019-12-17

## 2019-12-10 NOTE — PATIENT INSTRUCTIONS
Topiramate (By mouth)   Topiramate (toe-PIR-a-mate)  Treats and prevents seizures, and helps prevent migraine headaches. Brand Name(s): Qudexy XR, Topamax, Trokendi XR   There may be other brand names for this medicine. When This Medicine Should Not Be Used: This medicine is not right for everyone. Do not use it if you had an allergic reaction to topiramate, or if you are pregnant. How to Use This Medicine:   Capsule, Long Acting Capsule, Tablet  · Take your medicine as directed. Your dose may need to be changed several times to find what works best for you. · Tablet: Swallow whole. Do not break, crush, or chew the tablet. It has a very bitter taste. · Capsule or extended-release capsule: Do not crush or chew the capsule. Swallow whole or open the capsule and pour the medicine into a small amount (1 teaspoon) of soft food, such as applesauce. Swallow the mixture right away without chewing. Do not store the mixture for use at a later time. · Drink extra fluids so you will urinate more often and help prevent kidney problems. · This medicine should come with a Medication Guide. Ask your pharmacist for a copy if you do not have one. · Missed dose: Take a dose as soon as you remember. If it is almost time for your next dose, wait until then and take a regular dose. Do not take extra medicine to make up for a missed dose. If you miss a dose or forget to use your medicine, use it as soon as you can. If your next regular dose of Topamax® is less than 6 hours away, wait until then to use the medicine and skip the missed dose. If you miss more than 1 dose of Topamax®, call your doctor for instructions. · Store the medicine in a closed container at room temperature, away from heat, moisture, and direct light. Drugs and Foods to Avoid:   Ask your doctor or pharmacist before using any other medicine, including over-the-counter medicines, vitamins, and herbal products.   · Do not drink alcohol with Qudexy XR or Topamax®. Do not drink alcohol for 6 hours before and 6 hours after you take the Trokendi XR capsule. · Some medicines can affect how topiramate works. Tell your doctor if you are using acetazolamide, dichlorphenamide, dichloralphenazone, digoxin, lithium, metformin, zonisamide, other medicine for seizures (such as carbamazepine, phenytoin, valproic acid), or birth control pills. · Tell your doctor if you are using any medicine that makes you sleepy, such as allergy medicine or narcotic pain medicine. Warnings While Using This Medicine:   · It is not safe to take this medicine during pregnancy. It could harm an unborn baby. Tell your doctor right away if you become pregnant. · Tell your doctor if you are breastfeeding, or if you have kidney disease, liver disease, glaucoma, lung or breathing problems, osteoporosis, or a history of depression or mood disorders. Tell your doctor if you are on a ketogenic diet (high in fat and low in carbohydrates). · This medicine may cause the following problems:  ¨ Eye pain or vision changes, including glaucoma  ¨ Changes in body temperature  ¨ Metabolic acidosis (too much acid in the blood)  ¨ Kidney stones  · This medicine may increase depression or thoughts of suicide. Tell your doctor right away if you start to feel more depressed or think about hurting yourself. · This medicine may make you dizzy, drowsy, or tired. Do not drive or do anything else that could be dangerous until you know how this medicine affects you. · Do not stop using this medicine suddenly. Your doctor will need to slowly decrease your dose before you stop it completely. · Your doctor will do lab tests at regular visits to check on the effects of this medicine. Keep all appointments. · Keep all medicine out of the reach of children. Never share your medicine with anyone.   Possible Side Effects While Using This Medicine:   Call your doctor right away if you notice any of these side effects:  · Allergic reaction: Itching or hives, swelling in your face or hands, swelling or tingling in your mouth or throat, chest tightness, trouble breathing  · Bloody or cloudy urine, painful urination, sudden lower back or stomach pain  · Changes in vision, eye pain  · Confusion, problems with walking, clumsiness, dizziness, or trouble talking, concentrating, or remembering  · Feeling agitated, depressed, nervous, or irritable, thoughts of hurting yourself or others, unusual mood or behavior  · Fever, decreased sweating  · Numbness, tingling, or burning pain in your hands, arms, legs, or feet  · Rapid, deep breathing, loss of appetite, fast or uneven heartbeat  · Vomiting, unusual drowsiness, tiredness, or weakness  If you notice these less serious side effects, talk with your doctor:   · Change in taste  · Nausea, diarrhea  · Stuffy or runny nose  · Weight loss  If you notice other side effects that you think are caused by this medicine, tell your doctor. Call your doctor for medical advice about side effects. You may report side effects to FDA at 6-929-FDA-5841  © 2017 [facility] Information is for End User's use only and may not be sold, redistributed or otherwise used for commercial purposes. The above information is an  only. It is not intended as medical advice for individual conditions or treatments. Talk to your doctor, nurse or pharmacist before following any medical regimen to see if it is safe and effective for you.

## 2019-12-10 NOTE — PROGRESS NOTES
[address]  [place] [address]  Phone: [phone]  Fax: [phone]  PROGRESS NOTE  Patient: Alecia Fernandes                MRN: [id number]       SSN: [id number]  YOB: 1980        AGE: 44 y.o. SEX: female  Body mass index is 41.21 kg/m². PCP: Nilson Ward MD  12/10/19    Chief Complaint   Patient presents with    Back Pain     fu    Leg Pain       HISTORY OF PRESENT ILLNESS:  Alecia Fernandes is a 44 y.o.  female with history of chronic back and LLE pain and radiation of pain in the L4-5 distribution going all the way down her leg. Prior history of back problems: a recurrent disc herniation at L4-5 on the left, post-laminectomy changes, and advanced degenerative change at L2-3 and L3-4 per MRI 5/19. At last OV a few months ago she had a re-occurrence of her LLE sciatica, Dr. Martha Sanabria ordered a L L4-5 SNRB, which she had done with a 12 hr benefit. Today, pain has gotten so bad she went to [facility] ER a few days ago, they gave her a Toradol inj that did nothing. She comes in using her neighbor's walker, although on ambulation she leans forward, she feels like that helps alleviate the pressure. She is having a lot of muscle spasms. Pain is aching, burning and numbing, pain is worse with walking, lifting and affects sleep, work and recreational activities. Pain is better with nothing. Denies bladder/bowel dysfunction, saddle paresthesia, weakness, gait disturbance, or other neurological deficits. Significant Exam Findings: neg SLR, mild 4/5 LLE foot weakness    Medications: NSAIDs with no relief Failed Gabapentin and Lyrica    PMHx: obesity      ASSESSMENT   Diagnoses and all orders for this visit:    1. HNP (herniated nucleus pulposus), lumbar  -     SCHEDULE SURGERY    2. Lumbar radiculopathy  -     SCHEDULE SURGERY    Other orders  -     topiramate (TOPAMAX) 25 mg tablet;  Take 1-2 Tabs by mouth two (2) times daily (with meals). 1 tab nightly  x 5 days, then 2 tabs nightly x 5 day and then 3 tabs nightly  -     tiZANidine (ZANAFLEX) 2 mg tablet; Take 1-2 Tabs by mouth three (3) times daily as needed for Pain. IMPRESSION AND PLAN:  This is a pt with a HNP and LLE sciatica pain. She is inquiring about RFA, we usually use this in facet arthropathy. She would like to consider surgery but would like to repeat the inj prior to seeing Dr. Kelli Barker. We will set her up for that. She knows that she needs to lose weight.     > Pt was given information on Topamax   > L L4-5 SNRB  > Trial of Topamax and Tizanidine  > Ms. Magdalena Hernandez has a reminder for a \"due or due soon\" health maintenance. I have asked that she contact her primary care provider, Other, MD Nilson, for follow-up on this health maintenance.  > We have informed patient to notify us for immediate appointment if he has any worsening neurogical symptoms or if an emergency situation presents, then call 911  >  has been reviewed and is appropriate  > Pt will follow-up in 3 wks w/ Dr Gomez Byrne. Subjective    Work OFFICE    Smoking Status Non smoker    Pain Scale: 8/10    Pain Assessment  12/10/2019   Location of Pain Back;Leg;Hip   Location Modifiers -   Severity of Pain 8   Quality of Pain Aching   Quality of Pain Comment numbness tingling   Duration of Pain Persistent   Frequency of Pain Constant   Date Pain First Started -   Aggravating Factors Walking;Standing   Aggravating Factors Comment sitting   Limiting Behavior Some   Relieving Factors Nothing   Relieving Factors Comment -   Result of Injury -         REVIEW OF SYSTEMS  Constitutional: Negative for fever, chills, or weight change. Respiratory: Negative for cough or shortness of breath. Cardiovascular: Negative for chest pain or palpitations. Gastrointestinal: Negative for incontinence, acid reflux, change in bowel habits, or constipation. Genitourinary: Negative for incontinence, dysuria and flank pain. Musculoskeletal: Positive for back and LLE pain. See HPI. Skin: Negative for rash. Neurological:LLE L4-5  radiculopathy. See HPI. Endo/Heme/Allergies: Negative. Psychiatric/Behavioral: Negative. PHYSICAL EXAMINATION  Visit Vitals  BP (!) 159/100 (BP 1 Location: Left arm, BP Patient Position: Sitting)   Pulse 83   Temp 98.3 °F (36.8 °C) (Oral)   Resp 16   Ht 5' 8\" (1.727 m)   BMI 41.21 kg/m²         Accompanied by Spouse. Constitutional:  Well developed, well nourished, in no acute distress. Psychiatric: Affect and mood are appropriate. Integumentary: No rashes or abrasions noted on exposed areas. Cardiovascular/Peripheral Vascular: +2 radial & pedal pulses. No peripheral edema is noted. Lymphatic:  No evidence of lymphedema. No cervical lymphadenopathy. SPINE/MUSCULOSKELETAL EXAM     Lumbar spine:  No rash, ecchymosis, or gross obliquity. No fasciculations. No focal atrophy is noted. Range of motion is decreased and pain with extension. Tenderness to palpation L low back. No tenderness to palpation at the sciatic notch. SI joints non-tender. Trochanters non tender. Straight leg raise neg    Sensation grossly intact to light touch. MOTOR:     Hip Flex Quads Hamstrings Ankle DF EHL Ankle PF   Right +4/5 +4/5 +4/5 +4/5 +4/5 +4/5   Left +4/5 +4/5 +4/5 +4/5 +4/5 -4/5     Ambulation with walker. FWB. No foot drop noted        PAST MEDICAL HISTORY   Past Medical History:   Diagnosis Date    Cancer [facility])     skin at age 8, Thyroid 2005, Endometrial 2012    Chronic back pain     Sciatica of left side     Thyroid disease     hypothyroid       Past Surgical History:   Procedure Laterality Date    HX DILATION AND CURETTAGE  4047,7196    x2    HX HYSTERECTOMY      HX LUMBAR LAMINECTOMY  9/17/15    [place]    HX PARTIAL THYROIDECTOMY  2005   .       MEDICATIONS      Current Outpatient Medications   Medication Sig Dispense Refill    topiramate (TOPAMAX) 25 mg tablet Take 1-2 Tabs by mouth two (2) times daily (with meals). 1 tab nightly  x 5 days, then 2 tabs nightly x 5 day and then 3 tabs nightly 120 Tab 1    tiZANidine (ZANAFLEX) 2 mg tablet Take 1-2 Tabs by mouth three (3) times daily as needed for Pain. 45 Tab 1    celecoxib (CELEBREX) 100 mg capsule Take  by mouth two (2) times a day.  IBUPROFEN (ADVIL PO) Take  by mouth daily as needed.  levothyroxine (SYNTHROID) 125 mcg tablet Take 175 mcg by mouth Daily (before breakfast).  diazePAM (VALIUM) 5 mg tablet Take 5 mg by mouth every six (6) hours as needed for Anxiety.  gabapentin (NEURONTIN) 300 mg capsule Take 1 Cap by mouth three (3) times daily. 90 Cap 1    lidocaine (LIDODERM) 5 %       HYDROcodone-acetaminophen (NORCO) 5-325 mg per tablet Take 1 Tab by mouth four (4) times daily as needed for Pain. Max Daily Amount: 4 Tabs.  40 Tab 0        ALLERGIES    Allergies   Allergen Reactions    Morphine Nausea and Vomiting    Prednisone Unknown (comments)     Leg cramps  Steroid injections ok    Sulfa (Sulfonamide Antibiotics) Hives          SOCIAL HISTORY    Social History     Socioeconomic History    Marital status: UNKNOWN     Spouse name: Not on file    Number of children: Not on file    Years of education: Not on file    Highest education level: Not on file   Occupational History    Not on file   Social Needs    Financial resource strain: Not on file    Food insecurity:     Worry: Not on file     Inability: Not on file    Transportation needs:     Medical: Not on file     Non-medical: Not on file   Tobacco Use    Smoking status: Former Smoker     Last attempt to quit: 2015     Years since quittin.3    Smokeless tobacco: Never Used   Substance and Sexual Activity    Alcohol use: Yes     Comment: socially    Drug use: No    Sexual activity: Not on file   Lifestyle    Physical activity:     Days per week: Not on file     Minutes per session: Not on file    Stress: Not on file Relationships    Social connections:     Talks on phone: Not on file     Gets together: Not on file     Attends [employer] service: Not on file     Active member of club or organization: Not on file     Attends meetings of clubs or organizations: Not on file     Relationship status: Not on file    Intimate partner violence:     Fear of current or ex partner: Not on file     Emotionally abused: Not on file     Physically abused: Not on file     Forced sexual activity: Not on file   Other Topics Concern    Not on file   Social History Narrative    Not on file       FAMILY HISTORY    Family History   Problem Relation Age of Onset    No Known Problems Mother     No Known Problems Father          mOid Lomax, TANNER

## 2019-12-13 ENCOUNTER — HOSPITAL ENCOUNTER (OUTPATIENT)
Dept: PHYSICAL THERAPY | Age: 39
Discharge: HOME | End: 2019-12-13
Payer: OTHER GOVERNMENT

## 2019-12-13 ENCOUNTER — OFFICE VISIT (OUTPATIENT)
Dept: ORTHOPEDIC SURGERY | Age: 39
End: 2019-12-13

## 2019-12-13 ENCOUNTER — HOSPITAL ENCOUNTER (OUTPATIENT)
Dept: MRI IMAGING | Age: 39
Discharge: HOME | End: 2019-12-13
Payer: OTHER GOVERNMENT

## 2019-12-13 VITALS
BODY MASS INDEX: 39.71 KG/M2 | WEIGHT: 262 LBS | RESPIRATION RATE: 16 BRPM | OXYGEN SATURATION: 96 % | SYSTOLIC BLOOD PRESSURE: 132 MMHG | HEIGHT: 68 IN | DIASTOLIC BLOOD PRESSURE: 85 MMHG | TEMPERATURE: 97.9 F | HEART RATE: 100 BPM

## 2019-12-13 DIAGNOSIS — M54.16 LUMBAR RADICULOPATHY: ICD-10-CM

## 2019-12-13 DIAGNOSIS — M54.16 LUMBAR RADICULOPATHY: Primary | ICD-10-CM

## 2019-12-13 DIAGNOSIS — M54.50 LUMBAR PAIN: ICD-10-CM

## 2019-12-13 DIAGNOSIS — M54.50 NONSPECIFIC PAIN IN THE LUMBAR REGION: ICD-10-CM

## 2019-12-13 DIAGNOSIS — M51.26 HNP (HERNIATED NUCLEUS PULPOSUS), LUMBAR: Primary | ICD-10-CM

## 2019-12-13 DIAGNOSIS — E66.01 OBESITY, MORBID (HCC): ICD-10-CM

## 2019-12-13 DIAGNOSIS — M54.50 LUMBAR PAIN: Primary | ICD-10-CM

## 2019-12-13 PROCEDURE — 74011636320 HC RX REV CODE- 636/320: Performed by: ORTHOPAEDIC SURGERY

## 2019-12-13 PROCEDURE — A9575 INJ GADOTERATE MEGLUMI 0.1ML: HCPCS | Performed by: ORTHOPAEDIC SURGERY

## 2019-12-13 PROCEDURE — 97140 MANUAL THERAPY 1/> REGIONS: CPT

## 2019-12-13 PROCEDURE — 72158 MRI LUMBAR SPINE W/O & W/DYE: CPT

## 2019-12-13 RX ADMIN — GADOTERATE MEGLUMINE 20 ML: 376.9 INJECTION INTRAVENOUS at 20:15

## 2019-12-13 NOTE — PROGRESS NOTES
PT DAILY TREATMENT NOTE     Patient Name: Dania See  Date:2019  : 1980  [x]  Patient  Verified  Payor:  / Plan: [facility]  [facility] / Product Type:  /    In time:835 Out time:900  Total Treatment Time (min): 25  Visit #: 1 of -    Treatment Area: Lower back pain [M54.5]    SUBJECTIVE  Pain Level (0-10 scale): 8/10  Any medication changes, allergies to medications, adverse drug reactions, diagnosis change, or new procedure performed?: [x] No    [] Yes (see summary sheet for update)  Subjective functional status/changes:   [] No changes reported  Patient reports increased pain and has an apt with Dr Alex Limon at [phone]   . OBJECTIVE  Modality rationale: imrpoved spinal decompression with traction, and decrease myofascial tightness with BEVERLEY machine to improve transfers, gait and daily ADLs    Min Type Additional Details   TC [x]  Traction: [] Cervical       [x]Lumbar                       [] Prone          []Supine                       [x]Intermittent  3 step up  Lbs: 130/50  [] before manual  [x] after manual   TC [x]  Ice     - after traction - NT   Position: semilreclined  Location: L/S, and L HS    NT BEVERLEY - to L HS and LS  With needling    [x] Skin assessment post-treatment:  [x]intact []redness- no adverse reaction       []redness - adverse reaction:       10 min Therapeutic Exercise:  [x] See flow sheet :  NO charge    Rationale: increase ROM, increase strength and improve coordination to improve the patients ability to sit, stand, ADLs      15 min Manual Therapy: Technique:   IMT NC   Post needling ischemic compression  Post needling rolling and STM    Rationale:      decrease pain, increase ROM, increase tissue extensibility and decrease trigger points to improve patient's ability to sit, ambulate, ADLs    Dry Needling Procedure Note    Dry Needle Session Number:  32    Procedure:    An intramuscular manual therapy (dry needling) and a neuro-muscular re-education treatment was done to deactivate myofascial trigger points, with a 15/30 gauge solid filament needle, under aseptic technique. Indication(s): [] Muscle spasms [x] Myalgia/Myositis  [x] Muscle cramps      [x] Muscle imbalances [] TMD (TMJ) [] Myofascial pain & dysfunction     [] Other: __    Chart reviewed for the following:  Britney JONES PT, have reviewed the medical history, summary list and precautions/contraindications for [employer].      TIME OUT performed immediately prior to start of procedure:  835p (enter time the timeout was completed)  Britney JONES PT, have performed the following reviews on [employer] prior to the start of the session:      [x] Patient was identified by name and date of birth    [x] Agreement on all muscles being treated was verified   [x] Purpose of dry needling, side effects, possible complications, risks and benefits were explained to the patient   [x] Procedure site(s) verified  [x] Patient was positioned for comfort and draped for privacy  [x] Informed Consent was signed (initial visit) and verified verbally (subsequent visits)  [x] Patient was instructed on the need to report the use of blood thinners and/or immunosuppressant medications  [x] How to respond to possible adverse effects of treatment  [x] Self treatment of post needling soreness: ice, heat (moist heat, heat wraps) and stretching  [x] Opportunity was given to ask any questions, all questions were answered            Treatment:  The following muscles were treated today:    Right:    Left: Prone - L piriformis, L HS, L gastroc      Patients response to todays treatment:   [x]  LTRs  [x]  Muscle Relaxation  [x]  Pain Relief  []  Decreased Tinnitus  []  Decreased HAs []  Post needling soreness [x]  Increased ROM   []  Other:        X with TE min Patient Education: [x] Review HEP     Other Objective/Functional Measures:    Pain at worst 10/10   Gait - antalgic     Pain Level (0-10 scale) post treatment: 8    ASSESSMENT/Changes in Function: Patient not seen since 11/27 se  pending insurance auth. Patient accompanied by her  today. Elevated pain levels with decreased function over the weekend resulting in ER visit. Patient has FU with MD / surgeon today to see what option for surgery. Patient only had time for manual treatment today sec to [facility] for going to MD torres at 930. Patient will continue to benefit from skilled PT services to modify and progress therapeutic interventions, address functional mobility deficits, address ROM deficits, address strength deficits, analyze and address soft tissue restrictions, analyze and cue movement patterns, analyze and modify body mechanics/ergonomics, assess and modify postural abnormalities and instruct in home and community integration to attain remaining goals. []  See Plan of Care  [x]  See progress note/recertification 83/0/21  []  See Discharge Summary         Progress towards goals / Updated goals:        1. Patient will demo 100% bridge with pain less than 3/10 for indication of improved core strength for decreased compression with sitting at work - goal notmet - bridge AROM 25% limited by pain   2. Patient will demo increased L ext strength to  4-/5 for decreased LS strength with car transfers goal not met and unable to progress as intended sec to elevated pain levels 12/13/19  3. Patient will report 50% improvement in stair negotiation for safety in the home.   Goal met at 50%    PLAN  [x]  Upgrade activities as tolerated     []  Continue plan of care  []  Update interventions per flow sheet       []  Discharge due to:_  [x]  Other: assess results of MD melissa Gavin DPT  12/13/2019      Future Appointments   Date Time Provider Lise Jaime   12/13/2019  8:30 AM Bonita Saucedo., PT [facility]   12/13/2019  9:30 AM Bambi Medrano  [address]   12/20/2019  8:30 AM Carla Bay., PT [facility]

## 2019-12-13 NOTE — PROGRESS NOTES
[address], [address]  [place] [address] [facility] [address]  Phone: [phone]  Fax: [phone]  PROGRESS NOTE  Patient: Briseida Juarez                MRN: [id number]       SSN: [id number]  YOB: 1980        AGE: 44 y.o. SEX: female  Body mass index is 39.84 kg/m². PCP: Nilson Ward MD  12/13/19    Chief Complaint   Patient presents with    Back Pain     lower back pain    Leg Pain     left leg pain       HISTORY OF PRESENT ILLNESS, RADIOGRAPHS, and PLAN:     HISTORY OF PRESENT ILLNESS:  Ms. Chana Buerger returns today. She has been treated for a known recurrent disc herniation at L4-5 on the left. It had been improving, and she had a flare and then it was improving, and now, it has been progressively worsening for the past several weeks, certainly the past week. She is having progressive weakness in her left leg and now has a weak EHL and tib/ant with a functional foot drop. She denies bowel or bladder dysfunction. Her pain is a 7-10/10. ASSESSMENT/PLAN: I discussed the matter at length with her. My recommendation pending a new MRI would be a revision decompression likely at L4-5 on the left. I believe it is most likely that it is this disc herniation that has progressed to this current phase. She had her index surgery three years ago. We discussed the risks, benefits, complications, and alternatives to surgery. She would like to proceed. I will see her back after her new MRI and get her scheduled for surgery given her progressive neurological deficit.          Past Medical History:   Diagnosis Date    Cancer [facility])     skin at age 8, Thyroid 2005, Endometrial 2012    Chronic back pain     Sciatica of left side     Thyroid disease     hypothyroid       Family History   Problem Relation Age of Onset    No Known Problems Mother     No Known Problems Father        Current Outpatient Medications   Medication Sig Dispense Refill    tiZANidine (ZANAFLEX) 2 mg tablet Take 1-2 Tabs by mouth three (3) times daily as needed for Pain. 45 Tab 1    topiramate (TOPAMAX) 25 mg tablet Take 1-2 Tabs by mouth two (2) times daily (with meals). 120 Tab 1    celecoxib (CELEBREX) 100 mg capsule Take  by mouth two (2) times a day.  IBUPROFEN (ADVIL PO) Take  by mouth daily as needed.  levothyroxine (SYNTHROID) 125 mcg tablet Take 175 mcg by mouth Daily (before breakfast).  diazePAM (VALIUM) 5 mg tablet Take 5 mg by mouth every six (6) hours as needed for Anxiety.  gabapentin (NEURONTIN) 300 mg capsule Take 1 Cap by mouth three (3) times daily. 90 Cap 1    lidocaine (LIDODERM) 5 %       HYDROcodone-acetaminophen (NORCO) 5-325 mg per tablet Take 1 Tab by mouth four (4) times daily as needed for Pain. Max Daily Amount: 4 Tabs.  40 Tab 0       Allergies   Allergen Reactions    Morphine Nausea and Vomiting    Prednisone Unknown (comments)     Leg cramps  Steroid injections ok    Sulfa (Sulfonamide Antibiotics) Hives       Past Surgical History:   Procedure Laterality Date    HX DILATION AND CURETTAGE  6978,9408    x2    HX HYSTERECTOMY      HX LUMBAR LAMINECTOMY  9/17/15    Jessica Lundborg HX PARTIAL THYROIDECTOMY  2005       Past Medical History:   Diagnosis Date    Cancer [facility])     skin at age 8, Thyroid 2005, Endometrial 2012    Chronic back pain     Sciatica of left side     Thyroid disease     hypothyroid       Social History     Socioeconomic History    Marital status: UNKNOWN     Spouse name: Not on file    Number of children: Not on file    Years of education: Not on file    Highest education level: Not on file   Occupational History    Not on file   Social Needs    Financial resource strain: Not on file    Food insecurity:     Worry: Not on file     Inability: Not on file    Transportation needs:     Medical: Not on file     Non-medical: Not on file   Tobacco Use    Smoking status: Former Smoker     Types: Cigarettes  Smokeless tobacco: Never Used   Substance and Sexual Activity    Alcohol use: Yes     Comment: socially    Drug use: No    Sexual activity: Not on file   Lifestyle    Physical activity:     Days per week: Not on file     Minutes per session: Not on file    Stress: Not on file   Relationships    Social connections:     Talks on phone: Not on file     Gets together: Not on file     Attends [employer] service: Not on file     Active member of club or organization: Not on file     Attends meetings of clubs or organizations: Not on file     Relationship status: Not on file    Intimate partner violence:     Fear of current or ex partner: Not on file     Emotionally abused: Not on file     Physically abused: Not on file     Forced sexual activity: Not on file   Other Topics Concern    Not on file   Social History Narrative    Not on file         REVIEW OF SYSTEMS:   CONSTITUTIONAL SYMPTOMS:  Negative. EYES:  Negative. EARS, NOSE, THROAT AND MOUTH:  Negative. CARDIOVASCULAR:  Negative. RESPIRATORY:  Negative. GENITOURINARY: Per HPI. GASTROINTESTINAL:  Per HPI. INTEGUMENTARY (SKIN AND/OR BREAST):  Negative. MUSCULOSKELETAL: Per HPI.   ENDOCRINE/RHEUMATOLOGIC:  Negative. NEUROLOGICAL:  Per HPI. HEMATOLOGIC/LYMPHATIC:  Negative. ALLERGIC/IMMUNOLOGIC:  Negative. PSYCHIATRIC:  Negative. PHYSICAL EXAMINATION:   Visit Vitals  /85 (BP 1 Location: Left arm, BP Patient Position: Sitting)   Pulse 100   Temp 97.9 °F (36.6 °C) (Oral)   Resp 16   Ht 5' 8\" (1.727 m)   Wt 262 lb (118.8 kg)   SpO2 96%   BMI 39.84 kg/m²    PAIN SCALE: 8/10    CONSTITUTIONAL: The patient is in no apparent distress and is alert and oriented x 3. HEENT: Normocephalic. Hearing grossly intact. NECK: Supple and symmetric. no tenderness, or masses were felt. RESPIRATORY: No labored breathing. CARDIOVASCULAR: The carotid pulses were normal. Peripheral pulses were 2+.   CHEST: Normal AP diameter and normal contour without any kyphoscoliosis. LYMPHATIC: No lymphadenopathy was appreciated in the neck, axillae or groin. SKIN: Surgical scar. Negative for rashes, lesions, or ulcers on the right upper, right lower, left upper, left lower and trunk. NEUROLOGICAL: Alert and oriented x 3. Ambulation with walker. FWB. EXTREMITIES: See musculoskeletal.  MUSCULOSKELETAL:   Head and Neck:  Negative for misalignment, asymmetry, crepitation, defects, tenderness masses or effusions.  Left Upper Extremity: Inspection, percussion and palpation performed. Lymans sign is negative.  Right Upper Extremity: Inspection, percussion and palpation performed. Lymans sign is negative.  Spine, Ribs and Pelvis: Back and LLE pain. Inspection, percussion and palpation performed. Negative for misalignment, asymmetry, crepitation, defects, tenderness masses or effusions.  Left Lower Extremity: Pain. Toe numbness. Tib/ant and EHL weakness. Inspection, percussion and palpation performed. Negative straight leg raise.  Right Lower Extremity: Inspection, percussion and palpation performed. Negative straight leg raise. SPINE EXAM:     Lumbar spine: No rash, ecchymosis, or gross obliquity. No focal atrophy is noted. ASSESSMENT    ICD-10-CM ICD-9-CM    1. HNP (herniated nucleus pulposus), lumbar M51.26 722.10 MRI LUMB SPINE WO CONT   2. Lumbar radiculopathy M54.16 724.4 MRI LUMB SPINE WO CONT       Written by Genesis Gautam, as dictated by Usha Bunch MD.    I, Dr. Usha Bunch MD, confirm that all documentation is accurate.

## 2019-12-13 NOTE — PATIENT INSTRUCTIONS
Lumbar Spinal Stenosis: Care Instructions  Your Care Instructions    Stenosis in the spine is a narrowing of the canal that is around the spinal cord and nerve roots in your back. It can happen as part of aging. Sometimes bone and other tissue grow into this canal and press on the nerves that branch out from the spinal cord. This can cause pain, numbness, and weakness. When it happens in the lower part of your back, it is called lumbar spinal stenosis. It can cause problems in the legs, feet, and rear end (buttocks). You may be able to get relief from the symptoms of spinal stenosis by taking pain medicine. Your doctor may suggest physical therapy and exercises to keep your spine strong and flexible. Some people try steroid shots to reduce swelling. If pain and numbness in your legs are still so bad that you cannot do your normal activities, you may need surgery. Follow-up care is a key part of your treatment and safety. Be sure to make and go to all appointments, and call your doctor if you are having problems. It's also a good idea to know your test results and keep a list of the medicines you take. How can you care for yourself at home? · Take an over-the-counter pain medicine. Nonsteroidal anti-inflammatory drugs (NSAIDs) such as ibuprofen or naproxen seem to work best. But if you can't take NSAIDs, you can try acetaminophen. Be safe with medicines. Read and follow all instructions on the label. · Do not take two or more pain medicines at the same time unless the doctor told you to. Many pain medicines have acetaminophen, which is Tylenol. Too much acetaminophen (Tylenol) can be harmful. · Stay at a healthy weight. Being overweight puts extra strain on your spine. · Change positions often when you sit or stand. This can ease pain. It may also reduce pressure on the spinal cord and its nerves. · Avoid doing things that make your symptoms worse.  Walking downhill and standing for a long time may cause pain.  · Stretch and strengthen your back muscles as your doctor or physical therapist recommends. If your doctor says it is okay to do them, these exercises may help. ? Lie on your back with your knees bent. Gently pull one bent knee to your chest. Put that foot back on the floor, and then pull the other knee to your chest.  ? Do pelvic tilts. Lie on your back with your knees bent. Tighten your stomach muscles. Pull your belly button (navel) in and up toward your ribs. You should feel like your back is pressing to the floor and your hips and pelvis are slightly lifting off the floor. Hold for 6 seconds while breathing smoothly. ? Stand with your back flat against a wall. Slowly slide down until your knees are slightly bent. Hold for 10 seconds, then slide back up the wall. · Remove or change anything in your house that may cause you to fall. Keep walkways clear of clutter, electrical cords, and throw rugs. When should you call for help? Call 911 anytime you think you may need emergency care. For example, call if:    · You are unable to move a leg at all.   [facility] your doctor now or seek immediate medical care if:    · You have new or worse symptoms in your legs, belly, or buttocks. Symptoms may include:  ? Numbness or tingling. ? Weakness. ? Pain.     · You lose bladder or bowel control.    Watch closely for changes in your health, and be sure to contact your doctor if:    · You have a fever, lose weight, or don't feel well.     · You are not getting better as expected. Where can you learn more? Go to http://lissy-ángel.info/. Alissa Miller in the search box to learn more about \"Lumbar Spinal Stenosis: Care Instructions. \"  Current as of: June 26, 2019  Content Version: 12.2  © 1894-7299 [employer]. Care instructions adapted under license by [employer] (which disclaims liability or warranty for this information).  If you have questions about a medical condition or this instruction, always ask your healthcare professional. [address] any warranty or liability for your use of this information. Deciding About Surgery for Lumbar Spinal Stenosis  How can you decide about surgery for lumbar spinal stenosis? What is lumbar spinal stenosis? Lumbar spinal stenosis is the narrowing of the spinal canal in the lower back. This occurs when bone and other tissues grow inside the openings in the spinal bones. This can squeeze the nerves that branch out from the spinal cord. The squeezing can cause pain, numbness, or weakness. It happens most often in the legs, feet, or buttocks. You may choose to have surgery to ease your symptoms. Or you may try other treatments instead. These include medicines, exercise, and physical therapy. What are key points about this decision? · If your symptoms are mild to moderate, then medicine, physical therapy, and exercise may be all you need. · You may want surgery if you have tried other treatment for a while and your symptoms are still so bad that you can't do your normal activities. · Your symptoms may come back after a few years. You may need surgery again. · Surgery will most likely help leg pain. But it may not help back pain as much. Why might you choose to have surgery? · Surgery can relieve pain. It can also improve how well you can walk. · You have tried other treatment for a while and your symptoms are still so bad that you can't do your normal activities. · Without surgery, your symptoms may still bother you. If symptoms are very painful, they most often will not improve on their own. · Your doctor may advise surgery if you can't control your bladder or bowels as well as you used to. Surgery is also an option if you notice sudden changes in how well you can walk or you are more clumsy than before. Why might you choose not to have surgery? · You may try other treatments to help your symptoms.  These include medicine, exercise, and physical therapy. These other treatments work best for people with mild to moderate symptoms. · Risks from surgery include nerve injury and tissue tears. And you could have chronic pain, trouble passing urine, and a spine that is not stable. · All surgery has some risks. These include bleeding, infection, and risks from anesthesia. · You may not be able to return to all of your normal activities for many months. · Your symptoms may come back in a few years. You may need surgery again. Your decision  Thinking about the facts and your feelings can help you make a decision that is right for you. Be sure you understand the benefits and risks of your options, and think about what else you need to do before you make the decision. Where can you learn more? Go to http://lissy-ángel.info/. Enter I049 in the search box to learn more about \"Deciding About Surgery for Lumbar Spinal Stenosis. \"  Current as of: June 26, 2019  Content Version: 12.2  © 2457-5557 [employer]. Care instructions adapted under license by [employer] (which disclaims liability or warranty for this information). If you have questions about a medical condition or this instruction, always ask your healthcare professional. [address] any warranty or liability for your use of this information.

## 2019-12-17 ENCOUNTER — OFFICE VISIT (OUTPATIENT)
Dept: ORTHOPEDIC SURGERY | Age: 39
End: 2019-12-17

## 2019-12-17 VITALS
DIASTOLIC BLOOD PRESSURE: 90 MMHG | HEIGHT: 68 IN | WEIGHT: 265 LBS | BODY MASS INDEX: 40.16 KG/M2 | SYSTOLIC BLOOD PRESSURE: 136 MMHG | OXYGEN SATURATION: 100 % | HEART RATE: 78 BPM | TEMPERATURE: 98 F

## 2019-12-17 DIAGNOSIS — M51.26 HNP (HERNIATED NUCLEUS PULPOSUS), LUMBAR: Primary | ICD-10-CM

## 2019-12-17 DIAGNOSIS — M54.16 LUMBAR RADICULOPATHY: ICD-10-CM

## 2019-12-17 RX ORDER — CYCLOBENZAPRINE HCL 10 MG
10 TABLET ORAL
COMMUNITY

## 2019-12-17 NOTE — PROGRESS NOTES
[address] [facility]   Pre-Surgical Worksheet    Patient: Yas Castro                         MRN: [id number]     Age:  44 y.o.,      Sex: female    YOB: 1980           LUCIA: December 17, 2019  PCP: Sylvia, MD Nilson    Allergies   Allergen Reactions    Morphine Nausea and Vomiting    Prednisone Unknown (comments)     Leg cramps  Steroid injections ok    Sulfa (Sulfonamide Antibiotics) Hives         ICD-10-CM ICD-9-CM    1. HNP (herniated nucleus pulposus), lumbar M51.26 722.10    2. Lumbar radiculopathy M54.16 724.4        Surgery: Left L4/5 redo Lami disc. Pain Assessment   Pain Assessment  12/17/2019   Location of Pain Back;Leg   Location Modifiers Left   Severity of Pain 7   Quality of Pain Aching;Dull   Quality of Pain Comment numbness tingling   Duration of Pain Persistent   Frequency of Pain Constant   Date Pain First Started -   Aggravating Factors Standing   Aggravating Factors Comment sitting upright   Limiting Behavior Some   Relieving Factors Rest;Ice;Heat   Relieving Factors Comment pain med   Result of Injury -       Visit Vitals  /90 (BP 1 Location: Left arm, BP Patient Position: Sitting)   Pulse 78   Temp 98 °F (36.7 °C) (Oral)   Ht 5' 8\" (1.727 m)   Wt 265 lb (120.2 kg)   SpO2 100%   BMI 40.29 kg/m²       ADL Limits: Cane    Spine Surgery?: Yes When 2015. Where Dr. Erlinda Callaway. Spinal Injections?: Yes  When 27 OCT 19. Where Dr. Estela Cummings. Physical Therapy?: Yes. NSAID's?: Yes    Pain Medications?: Yes  Type: Oxycodone. In Pain Management: NO.    Current Outpatient Medications   Medication Sig    oxycodone HCl (OXYCODONE PO) Take  by mouth.  diazePAM (VALIUM) 5 mg tablet Take 5 mg by mouth every six (6) hours as needed for Anxiety.  lidocaine (LIDODERM) 5 %     IBUPROFEN (ADVIL PO) Take  by mouth daily as needed.  levothyroxine (SYNTHROID) 125 mcg tablet Take 200 mcg by mouth Daily (before breakfast).     tiZANidine (ZANAFLEX) 2 mg tablet Take 1-2 Tabs by mouth three (3) times daily as needed for Pain.  topiramate (TOPAMAX) 25 mg tablet Take 1-2 Tabs by mouth two (2) times daily (with meals).  celecoxib (CELEBREX) 100 mg capsule Take  by mouth two (2) times a day.  gabapentin (NEURONTIN) 300 mg capsule Take 1 Cap by mouth three (3) times daily.  HYDROcodone-acetaminophen (NORCO) 5-325 mg per tablet Take 1 Tab by mouth four (4) times daily as needed for Pain. Max Daily Amount: 4 Tabs. No current facility-administered medications for this visit. Past Medical History:   Diagnosis Date    Cancer [facility])     skin at age 8, Thyroid 2005, Endometrial 2012    Chronic back pain     Sciatica of left side     Thyroid disease     hypothyroid       Past Surgical History:   Procedure Laterality Date    HX DILATION AND CURETTAGE  1839,5338    x2    HX HYSTERECTOMY      HX LUMBAR LAMINECTOMY  9/17/15    [place]ne HX PARTIAL THYROIDECTOMY  2005       Social History     Socioeconomic History    Marital status: UNKNOWN     Spouse name: Not on file    Number of children: Not on file    Years of education: Not on file    Highest education level: Not on file   Tobacco Use    Smoking status: Former Smoker     Types: Cigarettes    Smokeless tobacco: Never Used   Substance and Sexual Activity    Alcohol use: Yes     Comment: socially    Drug use:  No

## 2019-12-17 NOTE — PROGRESS NOTES
[address]  [place], [address] [place] [address] Street  Phone: [phone]  Fax: [phone]  PROGRESS NOTE  Patient: Dania Cantu                MRN: [id number]       SSN: [id number]  YOB: 1980        AGE: 44 y.o. SEX: female  Body mass index is 40.29 kg/m². PCP: Nilson Ward MD  12/17/19    Chief Complaint   Patient presents with    Back Pain     MRI fu       HISTORY OF PRESENT ILLNESS, RADIOGRAPHS, and PLAN:     HISTORY OF PRESENT ILLNESS:  Ms. Corinne Cuevas returns today. She is still having severe, incapacitating sciatica. She is utilizing an assistive device and has a positive straight leg raise. She had an urgent MRI, and it demonstrates a large, recurrent disc herniation at L4-5. It is displacing the canal and nerve root dramatically on the left-hand side. ASSESSMENT/PLAN: We planned on her undergoing surgical intervention given her weakness, numbness, and severe pain, and the recurrent nature of this pain from a recurrent disc herniation. We will proceed with surgery. The risks, benefits, complications, and alternatives were discussed. The patient consented. Past Medical History:   Diagnosis Date    Cancer [facility])     skin at age 8, Thyroid 2005, Endometrial 2012    Chronic back pain     Sciatica of left side     Thyroid disease     hypothyroid       Family History   Problem Relation Age of Onset    No Known Problems Mother     No Known Problems Father        Current Outpatient Medications   Medication Sig Dispense Refill    oxycodone HCl (OXYCODONE PO) Take  by mouth.  diazePAM (VALIUM) 5 mg tablet Take 5 mg by mouth every six (6) hours as needed for Anxiety.  lidocaine (LIDODERM) 5 %       IBUPROFEN (ADVIL PO) Take  by mouth daily as needed.  levothyroxine (SYNTHROID) 125 mcg tablet Take 200 mcg by mouth Daily (before breakfast).       tiZANidine (ZANAFLEX) 2 mg tablet Take 1-2 Tabs by mouth three (3) times daily as needed for Pain. 45 Tab 1    topiramate (TOPAMAX) 25 mg tablet Take 1-2 Tabs by mouth two (2) times daily (with meals). 120 Tab 1    celecoxib (CELEBREX) 100 mg capsule Take  by mouth two (2) times a day.  gabapentin (NEURONTIN) 300 mg capsule Take 1 Cap by mouth three (3) times daily. 90 Cap 1    HYDROcodone-acetaminophen (NORCO) 5-325 mg per tablet Take 1 Tab by mouth four (4) times daily as needed for Pain. Max Daily Amount: 4 Tabs.  40 Tab 0       Allergies   Allergen Reactions    Morphine Nausea and Vomiting    Prednisone Unknown (comments)     Leg cramps  Steroid injections ok    Sulfa (Sulfonamide Antibiotics) Hives       Past Surgical History:   Procedure Laterality Date    HX DILATION AND CURETTAGE  7943,9912    x2    HX HYSTERECTOMY      HX LUMBAR LAMINECTOMY  9/17/15    [address] HX PARTIAL THYROIDECTOMY  2005       Past Medical History:   Diagnosis Date    Cancer [facility])     skin at age 8, Thyroid 2005, Endometrial 2012    Chronic back pain     Sciatica of left side     Thyroid disease     hypothyroid       Social History     Socioeconomic History    Marital status: UNKNOWN     Spouse name: Not on file    Number of children: Not on file    Years of education: Not on file    Highest education level: Not on file   Occupational History    Not on file   Social Needs    Financial resource strain: Not on file    Food insecurity:     Worry: Not on file     Inability: Not on file    Transportation needs:     Medical: Not on file     Non-medical: Not on file   Tobacco Use    Smoking status: Former Smoker     Types: Cigarettes    Smokeless tobacco: Never Used   Substance and Sexual Activity    Alcohol use: Yes     Comment: socially    Drug use: No    Sexual activity: Not on file   Lifestyle    Physical activity:     Days per week: Not on file     Minutes per session: Not on file    Stress: Not on file   Relationships    Social connections:     Talks on phone: Not on file     Gets together: Not on file     Attends Restorationist service: Not on file     Active member of club or organization: Not on file     Attends meetings of clubs or organizations: Not on file     Relationship status: Not on file    Intimate partner violence:     Fear of current or ex partner: Not on file     Emotionally abused: Not on file     Physically abused: Not on file     Forced sexual activity: Not on file   Other Topics Concern    Not on file   Social History Narrative    Not on file         REVIEW OF SYSTEMS:   CONSTITUTIONAL SYMPTOMS:  Negative. EYES:  Negative. EARS, NOSE, THROAT AND MOUTH:  Negative. CARDIOVASCULAR:  Negative. RESPIRATORY:  Negative. GENITOURINARY: Per HPI. GASTROINTESTINAL:  Per HPI. INTEGUMENTARY (SKIN AND/OR BREAST):  Negative. MUSCULOSKELETAL: Per HPI.   ENDOCRINE/RHEUMATOLOGIC:  Negative. NEUROLOGICAL:  Per HPI. HEMATOLOGIC/LYMPHATIC:  Negative. ALLERGIC/IMMUNOLOGIC:  Negative. PSYCHIATRIC:  Negative. PHYSICAL EXAMINATION:   Visit Vitals  /90 (BP 1 Location: Left arm, BP Patient Position: Sitting)   Pulse 78   Temp 98 °F (36.7 °C) (Oral)   Ht 5' 8\" (1.727 m)   Wt 265 lb (120.2 kg)   SpO2 100%   BMI 40.29 kg/m²    PAIN SCALE: 7/10    CONSTITUTIONAL: The patient is in no apparent distress and is alert and oriented x 3. HEENT: Normocephalic. Hearing grossly intact. NECK: Supple and symmetric. no tenderness, or masses were felt. RESPIRATORY: No labored breathing. CARDIOVASCULAR: The carotid pulses were normal. Peripheral pulses were 2+. CHEST: Normal AP diameter and normal contour without any kyphoscoliosis. LYMPHATIC: No lymphadenopathy was appreciated in the neck, axillae or groin. SKIN:  Negative for scars, rashes, lesions, or ulcers on the right upper, right lower, left upper, left lower and trunk. NEUROLOGICAL: Alert and oriented x 3. Ambulation with walker. FWB.   EXTREMITIES: See musculoskeletal.  MUSCULOSKELETAL:   Head and Neck:  Negative for misalignment, asymmetry, crepitation, defects, tenderness masses or effusions.  Left Upper Extremity: Inspection, percussion and palpation performed. Lymans sign is negative.  Right Upper Extremity: Inspection, percussion and palpation performed. Lymans sign is negative.  Spine, Ribs and Pelvis: Back pain radiating into LLE. Inspection, percussion and palpation performed. Negative for misalignment, asymmetry, crepitation, defects, tenderness masses or effusions.  Left Lower Extremity: Pain. Inspection, percussion and palpation performed. Negative straight leg raise.  Right Lower Extremity: Inspection, percussion and palpation performed. Negative straight leg raise. SPINE EXAM:     Lumbar spine: No rash, ecchymosis, or gross obliquity. No focal atrophy is noted. ASSESSMENT    ICD-10-CM ICD-9-CM    1. HNP (herniated nucleus pulposus), lumbar M51.26 722.10    2. Lumbar radiculopathy M54.16 724.4        Written by Katheleen Cushing, as dictated by Stiven Chanel MD.    I, Dr. Stiven Chanel MD, confirm that all documentation is accurate.

## 2019-12-17 NOTE — H&P
Pre-Admission History and Physical    Patient: Melia Brown   MRN: [id number]   SSN: [id number]   YOB: 1980   Age: 44 y.o. Sex: female     Patient scheduled for: redo left L4/5 lami disc. Date of surgery: 12/19/19. [facility] Surgeon: Samantha Samuels MD    HPI:  Melia Brown is a 44 y.o. female with progressive weakness in her left leg , a weak EHL and tib/ant with a functional foot drop. * She has been treated for a known recurrent disc herniation at L4-5 on the left. It had been improving, and she had a flare and then it was improving, and now, it has been progressively worsening for the past several weeks. She reports a pain level of 7/10. MRI demonstrates large left-sided and inferior recurrent disc extrusion clearly affecting the left L5 root at this level. This patient has failed the presurgical conservative treatments  including physical therapy, spinal block injections and medications. Pain has impacted the patient's functional ability to ambulate independently. She  is being admitted for surgical intervention. Past Medical History:   Diagnosis Date    Cancer [facility])     skin at age 8, Thyroid 2005, Endometrial 2012    Chronic back pain     Sciatica of left side     Thyroid disease     hypothyroid     Social History     Socioeconomic History    Marital status: UNKNOWN     Spouse name: Not on file    Number of children: Not on file    Years of education: Not on file    Highest education level: Not on file   Tobacco Use    Smoking status: Light Tobacco Smoker     Packs/day: 0.25     Types: Cigarettes    Smokeless tobacco: Never Used    Tobacco comment: 1/4 pack weekly   Substance and Sexual Activity    Alcohol use:  Yes     Alcohol/week: 2.0 standard drinks     Types: 2 Shots of liquor per week     Comment: monthly    Drug use: No     Past Surgical History:   Procedure Laterality Date    ABDOMEN SURGERY PROC UNLISTED  2017 cholecystectomy    HX DILATION AND CURETTAGE  1727,0052    x2    HX HYSTERECTOMY      HX LUMBAR LAMINECTOMY  9/17/15    Angela Polite HX PARTIAL THYROIDECTOMY  2005     Family History   Problem Relation Age of Onset    No Known Problems Mother     No Known Problems Father      Allergies   Allergen Reactions    Morphine Nausea and Vomiting    Prednisone Unknown (comments)     Leg cramps  Steroid injections ok    Sulfa (Sulfonamide Antibiotics) Hives     Current Outpatient Medications   Medication Sig Dispense Refill    oxycodone HCl (OXYCODONE PO) Take  by mouth.  cyclobenzaprine (FLEXERIL) 10 mg tablet Take 10 mg by mouth three (3) times daily as needed for Muscle Spasm(s).  IBUPROFEN (ADVIL PO) Take  by mouth daily as needed.  levothyroxine (SYNTHROID) 125 mcg tablet Take 200 mcg by mouth Daily (before breakfast).  diazePAM (VALIUM) 5 mg tablet Take 5 mg by mouth every six (6) hours as needed for Anxiety.  lidocaine (LIDODERM) 5 %       HYDROcodone-acetaminophen (NORCO) 5-325 mg per tablet Take 1 Tab by mouth four (4) times daily as needed for Pain. Max Daily Amount: 4 Tabs. 40 Tab 0       ROS:  Denies chills, fever,night sweats,  bowel or bladder dysfunction, unexplained weight loss/weight gain, chest pain, sob or anxiety. Physical Examination    Gen: Well developed, well nourished 44 y.o. female   /90 (BP 1 Location: Left arm, BP Patient Position: Sitting)   Pulse 78   Temp 98 °F (36.7 °C) (Oral)   Ht 5' 8\" (1.727 m)   Wt 265 lb (120.2 kg)   SpO2 100%   BMI 40.29 kg/m²    PAIN SCALE: 7/10     CONSTITUTIONAL: The patient is in no apparent distress and is alert and oriented x 3. HEENT: Normocephalic. Hearing grossly intact. NECK: Supple and symmetric. no tenderness, or masses were felt. RESPIRATORY: No labored breathing. CARDIOVASCULAR: The carotid pulses were normal. Peripheral pulses were 2+.   CHEST: Normal AP diameter and normal contour without any kyphoscoliosis. LYMPHATIC: No lymphadenopathy was appreciated in the neck, axillae or groin. SKIN:  Negative for scars, rashes, lesions, or ulcers on the right upper, right lower, left upper, left lower and trunk. NEUROLOGICAL: Alert and oriented x 3. Ambulation with walker. FWB. EXTREMITIES: See musculoskeletal.  MUSCULOSKELETAL:  · Head and Neck:  Negative for misalignment, asymmetry, crepitation, defects, tenderness masses or effusions. · Left Upper Extremity: Inspection, percussion and palpation performed. Lymans sign is negative. · Right Upper Extremity: Inspection, percussion and palpation performed. Lymans sign is negative. · Spine, Ribs and Pelvis: Back pain radiating into LLE. Inspection, percussion and palpation performed. Negative for misalignment, asymmetry, crepitation, defects, tenderness masses or effusions. · Left Lower Extremity: Pain. Inspection, percussion and palpation performed. Negative straight leg raise. · Right Lower Extremity: Inspection, percussion and palpation performed. Negative straight leg raise.           SPINE EXAM:      Lumbar spine: No rash, ecchymosis, or gross obliquity. No focal atrophy is noted.           Assessment and Plan    Due to the pt's persistent symptoms unrelieved by conservative measure Sharol Soulier is being [facility] to undergo surgical intervention. The post-operative plan of care consists of physical therapy, home health and a 2 week f/u office visit. The risks, benefits, complications and alternatives to surgery have been discussed in detail with the patient. The patient understands and agrees to proceed.      Denver Bastos, NP-C dictating for Cecille Chanel MD

## 2019-12-18 ENCOUNTER — HOSPITAL ENCOUNTER (OUTPATIENT)
Dept: PREADMISSION TESTING | Age: 39
Discharge: HOME | End: 2019-12-18
Payer: OTHER GOVERNMENT

## 2019-12-18 ENCOUNTER — ANESTHESIA EVENT (OUTPATIENT)
Dept: SURGERY | Age: 39
End: 2019-12-18
Payer: OTHER GOVERNMENT

## 2019-12-18 DIAGNOSIS — M51.26 HNP (HERNIATED NUCLEUS PULPOSUS), LUMBAR: ICD-10-CM

## 2019-12-18 LAB
ALBUMIN SERPL-MCNC: 3.8 G/DL (ref 3.4–5)
ALBUMIN/GLOB SERPL: 1.2 {RATIO} (ref 0.8–1.7)
ALP SERPL-CCNC: 121 U/L (ref 45–117)
ALT SERPL-CCNC: 36 U/L (ref 13–56)
ANION GAP SERPL CALC-SCNC: 5 MMOL/L (ref 3–18)
AST SERPL-CCNC: 17 U/L (ref 10–38)
BILIRUB SERPL-MCNC: 0.6 MG/DL (ref 0.2–1)
BUN SERPL-MCNC: 14 MG/DL (ref 7–18)
BUN/CREAT SERPL: 16 (ref 12–20)
CALCIUM SERPL-MCNC: 9.1 MG/DL (ref 8.5–10.1)
CHLORIDE SERPL-SCNC: 107 MMOL/L (ref 100–111)
CO2 SERPL-SCNC: 24 MMOL/L (ref 21–32)
CREAT SERPL-MCNC: 0.89 MG/DL (ref 0.6–1.3)
ERYTHROCYTE [DISTWIDTH] IN BLOOD BY AUTOMATED COUNT: 14.4 % (ref 11.6–14.5)
GLOBULIN SER CALC-MCNC: 3.2 G/DL (ref 2–4)
GLUCOSE SERPL-MCNC: 87 MG/DL (ref 74–99)
HCT VFR BLD AUTO: 39.3 % (ref 35–45)
HGB BLD-MCNC: 12.4 G/DL (ref 12–16)
MCH RBC QN AUTO: 27.6 PG (ref 24–34)
MCHC RBC AUTO-ENTMCNC: 31.6 G/DL (ref 31–37)
MCV RBC AUTO: 87.5 FL (ref 74–97)
PLATELET # BLD AUTO: 241 K/UL (ref 135–420)
PMV BLD AUTO: 9.2 FL (ref 9.2–11.8)
POTASSIUM SERPL-SCNC: 4.3 MMOL/L (ref 3.5–5.5)
PROT SERPL-MCNC: 7 G/DL (ref 6.4–8.2)
RBC # BLD AUTO: 4.49 M/UL (ref 4.2–5.3)
SODIUM SERPL-SCNC: 136 MMOL/L (ref 136–145)
WBC # BLD AUTO: 7.2 K/UL (ref 4.6–13.2)

## 2019-12-18 PROCEDURE — 80053 COMPREHEN METABOLIC PANEL: CPT

## 2019-12-18 PROCEDURE — 36415 COLL VENOUS BLD VENIPUNCTURE: CPT

## 2019-12-18 PROCEDURE — 85027 COMPLETE CBC AUTOMATED: CPT

## 2019-12-19 ENCOUNTER — APPOINTMENT (OUTPATIENT)
Dept: GENERAL RADIOLOGY | Age: 39
End: 2019-12-19
Attending: ORTHOPAEDIC SURGERY
Payer: OTHER GOVERNMENT

## 2019-12-19 ENCOUNTER — ANESTHESIA (OUTPATIENT)
Dept: SURGERY | Age: 39
End: 2019-12-19
Payer: OTHER GOVERNMENT

## 2019-12-19 ENCOUNTER — HOSPITAL ENCOUNTER (OUTPATIENT)
Age: 39
Discharge: HOME HEALTH | End: 2019-12-20
Attending: ORTHOPAEDIC SURGERY | Admitting: ORTHOPAEDIC SURGERY
Payer: OTHER GOVERNMENT

## 2019-12-19 DIAGNOSIS — Z98.890 S/P LUMBAR LAMINECTOMY: Primary | ICD-10-CM

## 2019-12-19 LAB — GLUCOSE BLD STRIP.AUTO-MCNC: 177 MG/DL (ref 70–110)

## 2019-12-19 PROCEDURE — 77030040361 HC SLV COMPR DVT MDII -B: Performed by: ORTHOPAEDIC SURGERY

## 2019-12-19 PROCEDURE — 74011250637 HC RX REV CODE- 250/637: Performed by: ORTHOPAEDIC SURGERY

## 2019-12-19 PROCEDURE — 74011000250 HC RX REV CODE- 250: Performed by: ORTHOPAEDIC SURGERY

## 2019-12-19 PROCEDURE — 82962 GLUCOSE BLOOD TEST: CPT

## 2019-12-19 PROCEDURE — 77030018836 HC SOL IRR NACL ICUM -A: Performed by: ORTHOPAEDIC SURGERY

## 2019-12-19 PROCEDURE — 77030040922 HC BLNKT HYPOTHRM STRY -A: Performed by: ORTHOPAEDIC SURGERY

## 2019-12-19 PROCEDURE — 77030027138 HC INCENT SPIROMETER -A: Performed by: ORTHOPAEDIC SURGERY

## 2019-12-19 PROCEDURE — 74011250636 HC RX REV CODE- 250/636: Performed by: NURSE PRACTITIONER

## 2019-12-19 PROCEDURE — 74011250636 HC RX REV CODE- 250/636: Performed by: ORTHOPAEDIC SURGERY

## 2019-12-19 PROCEDURE — 74011000250 HC RX REV CODE- 250: Performed by: NURSE ANESTHETIST, CERTIFIED REGISTERED

## 2019-12-19 PROCEDURE — 76010000149 HC OR TIME 1 TO 1.5 HR: Performed by: ORTHOPAEDIC SURGERY

## 2019-12-19 PROCEDURE — 77030040979: Performed by: ORTHOPAEDIC SURGERY

## 2019-12-19 PROCEDURE — 77030030163 HC BN WAX J&J -A: Performed by: ORTHOPAEDIC SURGERY

## 2019-12-19 PROCEDURE — 76060000033 HC ANESTHESIA 1 TO 1.5 HR: Performed by: ORTHOPAEDIC SURGERY

## 2019-12-19 PROCEDURE — 74011250636 HC RX REV CODE- 250/636

## 2019-12-19 PROCEDURE — 74011250636 HC RX REV CODE- 250/636: Performed by: NURSE ANESTHETIST, CERTIFIED REGISTERED

## 2019-12-19 PROCEDURE — 77030002933 HC SUT MCRYL J&J -A: Performed by: ORTHOPAEDIC SURGERY

## 2019-12-19 PROCEDURE — 74011250637 HC RX REV CODE- 250/637: Performed by: NURSE ANESTHETIST, CERTIFIED REGISTERED

## 2019-12-19 PROCEDURE — 99218 HC RM OBSERVATION: CPT

## 2019-12-19 PROCEDURE — 77030037134 HC WRAP COMPR BACK THER SOLM -B: Performed by: ORTHOPAEDIC SURGERY

## 2019-12-19 PROCEDURE — 74011000272 HC RX REV CODE- 272: Performed by: ORTHOPAEDIC SURGERY

## 2019-12-19 PROCEDURE — 77030003029 HC SUT VCRL J&J -B: Performed by: ORTHOPAEDIC SURGERY

## 2019-12-19 PROCEDURE — 77030004402 HC BUR NEUR STRY -C: Performed by: ORTHOPAEDIC SURGERY

## 2019-12-19 PROCEDURE — 76210000017 HC OR PH I REC 1.5 TO 2 HR: Performed by: ORTHOPAEDIC SURGERY

## 2019-12-19 PROCEDURE — 77030012890: Performed by: ORTHOPAEDIC SURGERY

## 2019-12-19 RX ORDER — SODIUM CHLORIDE 0.9 % (FLUSH) 0.9 %
5-40 SYRINGE (ML) INJECTION EVERY 8 HOURS
Status: DISCONTINUED | OUTPATIENT
Start: 2019-12-19 | End: 2019-12-19 | Stop reason: HOSPADM

## 2019-12-19 RX ORDER — LORAZEPAM 2 MG/ML
1 INJECTION INTRAMUSCULAR
Status: DISCONTINUED | OUTPATIENT
Start: 2019-12-19 | End: 2019-12-20 | Stop reason: HOSPADM

## 2019-12-19 RX ORDER — PROPOFOL 10 MG/ML
INJECTION, EMULSION INTRAVENOUS AS NEEDED
Status: DISCONTINUED | OUTPATIENT
Start: 2019-12-19 | End: 2019-12-19 | Stop reason: HOSPADM

## 2019-12-19 RX ORDER — SODIUM CHLORIDE 0.9 % (FLUSH) 0.9 %
5-40 SYRINGE (ML) INJECTION AS NEEDED
Status: DISCONTINUED | OUTPATIENT
Start: 2019-12-19 | End: 2019-12-19 | Stop reason: HOSPADM

## 2019-12-19 RX ORDER — OXYCODONE HYDROCHLORIDE 5 MG/1
5-10 TABLET ORAL
Status: DISCONTINUED | OUTPATIENT
Start: 2019-12-19 | End: 2019-12-20 | Stop reason: HOSPADM

## 2019-12-19 RX ORDER — FAMOTIDINE 20 MG/1
20 TABLET, FILM COATED ORAL ONCE
Status: COMPLETED | OUTPATIENT
Start: 2019-12-19 | End: 2019-12-19

## 2019-12-19 RX ORDER — FENTANYL CITRATE 50 UG/ML
INJECTION, SOLUTION INTRAMUSCULAR; INTRAVENOUS AS NEEDED
Status: DISCONTINUED | OUTPATIENT
Start: 2019-12-19 | End: 2019-12-19 | Stop reason: HOSPADM

## 2019-12-19 RX ORDER — LEVOTHYROXINE SODIUM 100 UG/1
200 TABLET ORAL
Status: DISCONTINUED | OUTPATIENT
Start: 2019-12-20 | End: 2019-12-20 | Stop reason: HOSPADM

## 2019-12-19 RX ORDER — SODIUM CHLORIDE 0.9 % (FLUSH) 0.9 %
5-40 SYRINGE (ML) INJECTION EVERY 8 HOURS
Status: DISCONTINUED | OUTPATIENT
Start: 2019-12-19 | End: 2019-12-20 | Stop reason: HOSPADM

## 2019-12-19 RX ORDER — BUPIVACAINE HYDROCHLORIDE 5 MG/ML
INJECTION, SOLUTION EPIDURAL; INTRACAUDAL AS NEEDED
Status: DISCONTINUED | OUTPATIENT
Start: 2019-12-19 | End: 2019-12-19 | Stop reason: HOSPADM

## 2019-12-19 RX ORDER — SODIUM CHLORIDE 0.9 % (FLUSH) 0.9 %
5-40 SYRINGE (ML) INJECTION AS NEEDED
Status: DISCONTINUED | OUTPATIENT
Start: 2019-12-19 | End: 2019-12-20 | Stop reason: HOSPADM

## 2019-12-19 RX ORDER — LIDOCAINE HYDROCHLORIDE 10 MG/ML
0.1 INJECTION, SOLUTION EPIDURAL; INFILTRATION; INTRACAUDAL; PERINEURAL AS NEEDED
Status: DISCONTINUED | OUTPATIENT
Start: 2019-12-19 | End: 2019-12-19 | Stop reason: HOSPADM

## 2019-12-19 RX ORDER — ONDANSETRON 2 MG/ML
4 INJECTION INTRAMUSCULAR; INTRAVENOUS
Status: DISCONTINUED | OUTPATIENT
Start: 2019-12-19 | End: 2019-12-20 | Stop reason: HOSPADM

## 2019-12-19 RX ORDER — ACETAMINOPHEN 500 MG
1000 TABLET ORAL EVERY 6 HOURS
Status: DISCONTINUED | OUTPATIENT
Start: 2019-12-19 | End: 2019-12-20 | Stop reason: HOSPADM

## 2019-12-19 RX ORDER — DIPHENHYDRAMINE HYDROCHLORIDE 50 MG/ML
12.5 INJECTION, SOLUTION INTRAMUSCULAR; INTRAVENOUS
Status: DISCONTINUED | OUTPATIENT
Start: 2019-12-19 | End: 2019-12-20 | Stop reason: HOSPADM

## 2019-12-19 RX ORDER — HYDROCODONE BITARTRATE AND ACETAMINOPHEN 5; 325 MG/1; MG/1
1 TABLET ORAL
Qty: 20 TAB | Refills: 0 | Status: SHIPPED | OUTPATIENT
Start: 2019-12-19 | End: 2019-12-22

## 2019-12-19 RX ORDER — NEOSTIGMINE METHYLSULFATE 1 MG/ML
INJECTION, SOLUTION INTRAVENOUS AS NEEDED
Status: DISCONTINUED | OUTPATIENT
Start: 2019-12-19 | End: 2019-12-19 | Stop reason: HOSPADM

## 2019-12-19 RX ORDER — GLYCOPYRROLATE 0.2 MG/ML
INJECTION INTRAMUSCULAR; INTRAVENOUS AS NEEDED
Status: DISCONTINUED | OUTPATIENT
Start: 2019-12-19 | End: 2019-12-19 | Stop reason: HOSPADM

## 2019-12-19 RX ORDER — DEXAMETHASONE SODIUM PHOSPHATE 4 MG/ML
INJECTION, SOLUTION INTRA-ARTICULAR; INTRALESIONAL; INTRAMUSCULAR; INTRAVENOUS; SOFT TISSUE AS NEEDED
Status: DISCONTINUED | OUTPATIENT
Start: 2019-12-19 | End: 2019-12-19 | Stop reason: HOSPADM

## 2019-12-19 RX ORDER — HYDROMORPHONE HYDROCHLORIDE 1 MG/ML
1 INJECTION, SOLUTION INTRAMUSCULAR; INTRAVENOUS; SUBCUTANEOUS
Status: DISCONTINUED | OUTPATIENT
Start: 2019-12-19 | End: 2019-12-20 | Stop reason: HOSPADM

## 2019-12-19 RX ORDER — ONDANSETRON 2 MG/ML
4 INJECTION INTRAMUSCULAR; INTRAVENOUS ONCE
Status: COMPLETED | OUTPATIENT
Start: 2019-12-19 | End: 2019-12-19

## 2019-12-19 RX ORDER — SODIUM CHLORIDE, SODIUM LACTATE, POTASSIUM CHLORIDE, CALCIUM CHLORIDE 600; 310; 30; 20 MG/100ML; MG/100ML; MG/100ML; MG/100ML
75 INJECTION, SOLUTION INTRAVENOUS CONTINUOUS
Status: DISCONTINUED | OUTPATIENT
Start: 2019-12-19 | End: 2019-12-19 | Stop reason: HOSPADM

## 2019-12-19 RX ORDER — SUCCINYLCHOLINE CHLORIDE 20 MG/ML
INJECTION INTRAMUSCULAR; INTRAVENOUS AS NEEDED
Status: DISCONTINUED | OUTPATIENT
Start: 2019-12-19 | End: 2019-12-19 | Stop reason: HOSPADM

## 2019-12-19 RX ORDER — DIPHENHYDRAMINE HCL 25 MG
25 CAPSULE ORAL
Status: DISCONTINUED | OUTPATIENT
Start: 2019-12-19 | End: 2019-12-20 | Stop reason: HOSPADM

## 2019-12-19 RX ORDER — HYDROMORPHONE HYDROCHLORIDE 2 MG/ML
0.5 INJECTION, SOLUTION INTRAMUSCULAR; INTRAVENOUS; SUBCUTANEOUS AS NEEDED
Status: DISCONTINUED | OUTPATIENT
Start: 2019-12-19 | End: 2019-12-19 | Stop reason: HOSPADM

## 2019-12-19 RX ORDER — LIDOCAINE HYDROCHLORIDE 20 MG/ML
INJECTION, SOLUTION EPIDURAL; INFILTRATION; INTRACAUDAL; PERINEURAL AS NEEDED
Status: DISCONTINUED | OUTPATIENT
Start: 2019-12-19 | End: 2019-12-19 | Stop reason: HOSPADM

## 2019-12-19 RX ORDER — MAGNESIUM SULFATE 100 %
4 CRYSTALS MISCELLANEOUS AS NEEDED
Status: DISCONTINUED | OUTPATIENT
Start: 2019-12-19 | End: 2019-12-19 | Stop reason: HOSPADM

## 2019-12-19 RX ORDER — HYDROMORPHONE HYDROCHLORIDE 2 MG/ML
INJECTION, SOLUTION INTRAMUSCULAR; INTRAVENOUS; SUBCUTANEOUS
Status: DISPENSED
Start: 2019-12-19 | End: 2019-12-20

## 2019-12-19 RX ORDER — PREGABALIN 75 MG/1
75 CAPSULE ORAL ONCE
Status: COMPLETED | OUTPATIENT
Start: 2019-12-19 | End: 2019-12-19

## 2019-12-19 RX ORDER — ACETAMINOPHEN 500 MG
1000 TABLET ORAL ONCE
Status: COMPLETED | OUTPATIENT
Start: 2019-12-19 | End: 2019-12-19

## 2019-12-19 RX ORDER — ONDANSETRON 2 MG/ML
INJECTION INTRAMUSCULAR; INTRAVENOUS AS NEEDED
Status: DISCONTINUED | OUTPATIENT
Start: 2019-12-19 | End: 2019-12-19 | Stop reason: HOSPADM

## 2019-12-19 RX ORDER — DIAZEPAM 5 MG/1
5 TABLET ORAL
Status: DISCONTINUED | OUTPATIENT
Start: 2019-12-19 | End: 2019-12-19 | Stop reason: SDUPTHER

## 2019-12-19 RX ORDER — VANCOMYCIN HYDROCHLORIDE 1 G/20ML
INJECTION, POWDER, LYOPHILIZED, FOR SOLUTION INTRAVENOUS AS NEEDED
Status: DISCONTINUED | OUTPATIENT
Start: 2019-12-19 | End: 2019-12-19 | Stop reason: HOSPADM

## 2019-12-19 RX ORDER — NALOXONE HYDROCHLORIDE 0.4 MG/ML
0.4 INJECTION, SOLUTION INTRAMUSCULAR; INTRAVENOUS; SUBCUTANEOUS AS NEEDED
Status: DISCONTINUED | OUTPATIENT
Start: 2019-12-19 | End: 2019-12-20 | Stop reason: HOSPADM

## 2019-12-19 RX ORDER — DEXTROSE 50 % IN WATER (D50W) INTRAVENOUS SYRINGE
25-50 AS NEEDED
Status: DISCONTINUED | OUTPATIENT
Start: 2019-12-19 | End: 2019-12-19 | Stop reason: HOSPADM

## 2019-12-19 RX ORDER — ROCURONIUM BROMIDE 10 MG/ML
INJECTION, SOLUTION INTRAVENOUS AS NEEDED
Status: DISCONTINUED | OUTPATIENT
Start: 2019-12-19 | End: 2019-12-19 | Stop reason: HOSPADM

## 2019-12-19 RX ORDER — MIDAZOLAM HYDROCHLORIDE 1 MG/ML
INJECTION, SOLUTION INTRAMUSCULAR; INTRAVENOUS AS NEEDED
Status: DISCONTINUED | OUTPATIENT
Start: 2019-12-19 | End: 2019-12-19 | Stop reason: HOSPADM

## 2019-12-19 RX ORDER — DIAZEPAM 5 MG/1
5 TABLET ORAL
Status: DISCONTINUED | OUTPATIENT
Start: 2019-12-19 | End: 2019-12-20 | Stop reason: HOSPADM

## 2019-12-19 RX ORDER — CEFAZOLIN SODIUM 2 G/50ML
2 SOLUTION INTRAVENOUS EVERY 8 HOURS
Status: DISCONTINUED | OUTPATIENT
Start: 2019-12-19 | End: 2019-12-20 | Stop reason: HOSPADM

## 2019-12-19 RX ADMIN — FAMOTIDINE 20 MG: 20 TABLET, FILM COATED ORAL at 12:05

## 2019-12-19 RX ADMIN — CEFAZOLIN SODIUM IN SODIUM CHLORIDE 0.9% IV SOLN 3 GM/100ML 3 G: 3-0.9/1 SOLUTION at 14:18

## 2019-12-19 RX ADMIN — PREGABALIN 75 MG: 75 CAPSULE ORAL at 12:05

## 2019-12-19 RX ADMIN — ROCURONIUM BROMIDE 15 MG: 10 INJECTION, SOLUTION INTRAVENOUS at 14:35

## 2019-12-19 RX ADMIN — SODIUM CHLORIDE, SODIUM LACTATE, POTASSIUM CHLORIDE, AND CALCIUM CHLORIDE: 600; 310; 30; 20 INJECTION, SOLUTION INTRAVENOUS at 15:29

## 2019-12-19 RX ADMIN — HYDROMORPHONE HYDROCHLORIDE 0.5 MG: 2 INJECTION, SOLUTION INTRAMUSCULAR; INTRAVENOUS; SUBCUTANEOUS at 16:55

## 2019-12-19 RX ADMIN — Medication 10 ML: at 21:37

## 2019-12-19 RX ADMIN — MIDAZOLAM HYDROCHLORIDE 2 MG: 2 INJECTION, SOLUTION INTRAMUSCULAR; INTRAVENOUS at 14:18

## 2019-12-19 RX ADMIN — ACETAMINOPHEN 1000 MG: 500 TABLET ORAL at 23:39

## 2019-12-19 RX ADMIN — CEFAZOLIN SODIUM IN SODIUM CHLORIDE 0.9% IV SOLN 3 GM/100ML 3 G: 3-0.9/1 SOLUTION at 14:46

## 2019-12-19 RX ADMIN — HYDROMORPHONE HYDROCHLORIDE 0.5 MG: 2 INJECTION, SOLUTION INTRAMUSCULAR; INTRAVENOUS; SUBCUTANEOUS at 16:13

## 2019-12-19 RX ADMIN — GLYCOPYRROLATE 0.4 MG: 0.2 INJECTION INTRAMUSCULAR; INTRAVENOUS at 15:23

## 2019-12-19 RX ADMIN — CEFAZOLIN SODIUM 2 G: 2 SOLUTION INTRAVENOUS at 21:36

## 2019-12-19 RX ADMIN — SODIUM CHLORIDE, SODIUM LACTATE, POTASSIUM CHLORIDE, AND CALCIUM CHLORIDE 75 ML/HR: 600; 310; 30; 20 INJECTION, SOLUTION INTRAVENOUS at 12:05

## 2019-12-19 RX ADMIN — FENTANYL CITRATE 100 MCG: 50 INJECTION INTRAMUSCULAR; INTRAVENOUS at 14:18

## 2019-12-19 RX ADMIN — SUCCINYLCHOLINE CHLORIDE 140 MG: 20 INJECTION, SOLUTION INTRAMUSCULAR; INTRAVENOUS at 14:28

## 2019-12-19 RX ADMIN — PROPOFOL 200 MG: 10 INJECTION, EMULSION INTRAVENOUS at 14:28

## 2019-12-19 RX ADMIN — ROCURONIUM BROMIDE 5 MG: 10 INJECTION, SOLUTION INTRAVENOUS at 14:28

## 2019-12-19 RX ADMIN — OXYCODONE HYDROCHLORIDE 5 MG: 5 TABLET ORAL at 21:41

## 2019-12-19 RX ADMIN — ONDANSETRON 4 MG: 2 INJECTION INTRAMUSCULAR; INTRAVENOUS at 16:25

## 2019-12-19 RX ADMIN — Medication 10 ML: at 21:33

## 2019-12-19 RX ADMIN — ACETAMINOPHEN 1000 MG: 500 TABLET ORAL at 20:19

## 2019-12-19 RX ADMIN — ONDANSETRON 4 MG: 2 INJECTION INTRAMUSCULAR; INTRAVENOUS at 14:55

## 2019-12-19 RX ADMIN — LIDOCAINE HYDROCHLORIDE 100 MG: 20 INJECTION, SOLUTION EPIDURAL; INFILTRATION; INTRACAUDAL; PERINEURAL at 14:28

## 2019-12-19 RX ADMIN — Medication 3 MG: at 15:23

## 2019-12-19 RX ADMIN — DEXAMETHASONE SODIUM PHOSPHATE 8 MG: 4 INJECTION, SOLUTION INTRAMUSCULAR; INTRAVENOUS at 14:55

## 2019-12-19 RX ADMIN — ACETAMINOPHEN 1000 MG: 500 TABLET ORAL at 12:05

## 2019-12-19 NOTE — ANESTHESIA POSTPROCEDURE EVALUATION
Procedure(s):  SPINE LUMBAR LAMINECTOMY/DISCECTOMY LEFT L 4/5 REDO. general    Anesthesia Post Evaluation      Multimodal analgesia: multimodal analgesia used between 6 hours prior to anesthesia start to PACU discharge  Patient location during evaluation: bedside  Patient participation: complete - patient participated  Level of consciousness: awake  Pain management: adequate  Airway patency: patent  Anesthetic complications: no  Cardiovascular status: stable  Respiratory status: acceptable  Hydration status: acceptable  Post anesthesia nausea and vomiting:  controlled      Vitals Value Taken Time   /71 12/19/2019  4:55 PM   Temp 36.8 °C (98.3 °F) 12/19/2019  4:00 PM   Pulse 54 12/19/2019  5:04 PM   Resp 18 12/19/2019  5:04 PM   SpO2 98 % 12/19/2019  5:04 PM   Vitals shown include unvalidated device data.

## 2019-12-19 NOTE — INTERVAL H&P NOTE
H&P Update: 
Filippo Garcia was seen and examined. History and physical has been reviewed. The patient has been examined.  There have been no significant clinical changes since the completion of the originally dated History and Physical.

## 2019-12-19 NOTE — PROGRESS NOTES
OR today redo left L4/5 laminectomy discectomy  Seen in PACU  VSS  Dressing dry  Left foot drop is resolved  BLE 4/5    Ana Valdes, NP

## 2019-12-19 NOTE — ANESTHESIA PREPROCEDURE EVALUATION
Relevant Problems   No relevant active problems       Anesthetic History   No history of anesthetic complications            Review of Systems / Medical History  Patient summary reviewed and pertinent labs reviewed    Pulmonary  Within defined limits                 Neuro/Psych   Within defined limits           Cardiovascular  Within defined limits                Exercise tolerance: >4 METS     GI/Hepatic/Renal  Within defined limits              Endo/Other      Hypothyroidism: well controlled  Morbid obesity     Other Findings   Comments:                  Physical Exam    Airway  Mallampati: III  TM Distance: 4 - 6 cm  Neck ROM: normal range of motion   Mouth opening: Normal     Cardiovascular  Regular rate and rhythm,  S1 and S2 normal,  no murmur, click, rub, or gallop  Rhythm: regular  Rate: normal         Dental  No notable dental hx       Pulmonary  Breath sounds clear to auscultation               Abdominal  GI exam deferred       Other Findings            Anesthetic Plan    ASA: 3  Anesthesia type: general          Induction: Intravenous  Anesthetic plan and risks discussed with: Patient

## 2019-12-19 NOTE — ROUTINE PROCESS
TRANSFER - OUT REPORT: 
 
Verbal report given to Yessica(name) on Filippo Garcia  being transferred to room 515(unit) for routine progression of care Report consisted of patients Situation, Background, Assessment and  
Recommendations(SBAR). Information from the following report(s) SBAR, OR Summary, MAR and Recent Results was reviewed with the receiving nurse. Lines:  
Peripheral IV 12/19/19 Left Arm (Active) Site Assessment Clean, dry, & intact 12/19/2019  3:56 PM  
Phlebitis Assessment 0 12/19/2019  3:56 PM  
Infiltration Assessment 0 12/19/2019  3:56 PM  
Dressing Status Clean, dry, & intact 12/19/2019  3:56 PM  
Dressing Type Tape;Transparent 12/19/2019  3:56 PM  
Hub Color/Line Status Pink; Infusing 12/19/2019  3:56 PM  
Alcohol Cap Used No 12/19/2019 12:02 PM  
  
 
Opportunity for questions and clarification was provided. Patient transported with: 
 Registered Nurse Tech

## 2019-12-19 NOTE — BRIEF OP NOTE
BRIEF OPERATIVE NOTE    Date of Procedure: 12/19/2019   Preoperative Diagnosis: HNP Recurrent Left Lumbar 4/5  Postoperative Diagnosis: HNP Recurrent Left Lumbar 4/5    Procedure(s):  SPINE LUMBAR LAMINECTOMY/DISCECTOMY LEFT L 4/5 REDO  Surgeon(s) and Role:     Mukesh Nix MD - Primary         Surgical Assistant: 0    Surgical Staff:  Circ-1: Neelam Purdy RN  Circ-2: Quique Alejandra RN  Radiology Technician: Kimo Hightower, Isrrael Huertas, RT  Scrub Tech-1: Pepe Westfall Asst-1: Caro Tracy R  Event Time In Time Out   Incision Start 1450    Incision Close       Anesthesia: General   Estimated Blood Loss: 5  Specimens: * No specimens in log *   Findings: large hnp  Complications: 0  Implants: * No implants in log *

## 2019-12-20 ENCOUNTER — HOME HEALTH ADMISSION (OUTPATIENT)
Dept: HOME HEALTH SERVICES | Facility: HOME HEALTH | Age: 39
End: 2019-12-20
Payer: OTHER GOVERNMENT

## 2019-12-20 ENCOUNTER — HOSPITAL ENCOUNTER (OUTPATIENT)
Dept: PHYSICAL THERAPY | Age: 39
End: 2019-12-20
Payer: OTHER GOVERNMENT

## 2019-12-20 VITALS
OXYGEN SATURATION: 100 % | HEIGHT: 68 IN | WEIGHT: 268.4 LBS | DIASTOLIC BLOOD PRESSURE: 63 MMHG | HEART RATE: 72 BPM | TEMPERATURE: 97.5 F | SYSTOLIC BLOOD PRESSURE: 107 MMHG | BODY MASS INDEX: 40.68 KG/M2 | RESPIRATION RATE: 18 BRPM

## 2019-12-20 PROCEDURE — 97535 SELF CARE MNGMENT TRAINING: CPT

## 2019-12-20 PROCEDURE — 90471 IMMUNIZATION ADMIN: CPT

## 2019-12-20 PROCEDURE — 74011250637 HC RX REV CODE- 250/637: Performed by: ORTHOPAEDIC SURGERY

## 2019-12-20 PROCEDURE — 74011250636 HC RX REV CODE- 250/636: Performed by: ORTHOPAEDIC SURGERY

## 2019-12-20 PROCEDURE — 90686 IIV4 VACC NO PRSV 0.5 ML IM: CPT | Performed by: ORTHOPAEDIC SURGERY

## 2019-12-20 PROCEDURE — 97161 PT EVAL LOW COMPLEX 20 MIN: CPT

## 2019-12-20 PROCEDURE — 97165 OT EVAL LOW COMPLEX 30 MIN: CPT

## 2019-12-20 RX ADMIN — CEFAZOLIN SODIUM 2 G: 2 SOLUTION INTRAVENOUS at 05:49

## 2019-12-20 RX ADMIN — LEVOTHYROXINE SODIUM 200 MCG: 100 TABLET ORAL at 06:37

## 2019-12-20 RX ADMIN — INFLUENZA VIRUS VACCINE 0.5 ML: 15; 15; 15; 15 SUSPENSION INTRAMUSCULAR at 10:12

## 2019-12-20 RX ADMIN — Medication 10 ML: at 05:49

## 2019-12-20 RX ADMIN — ACETAMINOPHEN 1000 MG: 500 TABLET ORAL at 05:49

## 2019-12-20 NOTE — OP NOTES
[facility]  OPERATIVE REPORT    Name:  Shanice Miller  MR#:   [id number]  :  1980  ACCOUNT #:  [de-identified]  DATE OF SERVICE:  2019    PREOPERATIVE DIAGNOSIS:  Recurrent herniated nucleus pulposus, L4-5 on left. POSTOPERATIVE DIAGNOSIS:  Recurrent herniated nucleus pulposus, L4-5 on left. PROCEDURE PERFORMED:  Redo left L4-5 hemilaminectomy, medial facetectomy, diskectomy. SURGEON:  Mathias Bamberger, MD    ASSISTANT:  0    ANESTHESIA:  General endotracheal.    COMPLICATIONS:  None. SPECIMENS REMOVED:  None. IMPLANTS:  None. ESTIMATED BLOOD LOSS:  5 mL. FINDINGS:  The patient had a massive extruded disk herniation causing severe L5 nerve root compression. PROCEDURE:  Follow induction of general endotracheal anesthesia, the patient was turned to prone position on a spinal frame. The patient was prepped and draped in the usual fashion. Previous incision was incised. Scarified subcutaneous fat incised. Morbid obesity made the surgery more difficult. Paramedian incision was made in the lumbodorsal fascia, and subperiosteal dissection was done through scarified tissue at L4-5. C-arm image verified our surgical level. I defined the previous laminotomy site and performed a revision laminotomy with medial facetectomy, mobilizing the neural elements until I could palpate the pedicle of L5 and differentiate between the disk and traversing scarified neural elements. I incised the disk and was able to open annulotomy, and a large, massive extruded sequestered disk herniation expressed itself. It came out in approximately two large fragments. There was unstable disk material remaining in the disk space that was removed. At the conclusion, the nerve root was free and mobile. There was no evidence of continuing nerve root compression, and the disk appeared to be stable. There was no evident bleeding. A pledget of Gelfoam and thrombin placed over the laminotomy site. The lumbodorsal fascia was closed with number 1 Vicryl, subcutaneous tissues with 2-0 Vicryl, skin closed with a 4-0 Monocryl subcuticular suture and Dermabond. Sterile occlusive dressing was placed upon the wound. All counts were correct.       MD SIMONA [email or web address]  D:  12/19/2019 15:39  T:  JOB #:  [id number]

## 2019-12-20 NOTE — PROGRESS NOTES
PHYSICAL THERAPY EVALUATION AND DISCHARGE    Patient: Yahir Lugo (55 y.o. female)  Date: 12/20/2019  Primary Diagnosis: HNP (herniated nucleus pulposus), lumbar [M51.26]  HNP (herniated nucleus pulposus), lumbar [M51.26]  Procedure(s) (LRB):  SPINE LUMBAR LAMINECTOMY/DISCECTOMY LEFT L 4/5 REDO (Left) 1 Day Post-Op   Precautions:   Spinal    PLOF: Independent with mobility including gait using rollator for outside as needed and single point cane as needed inside mostly due to L LE weakness and L foot drop. ASSESSMENT :  PT orders received and patient cleared by nursing to participate with therapy. Patient is a 44 y.o. female admitted to the hospital due to Left L4/5 redo laminectomy. Patient consents to PT evaluation and treatment. Pt educated on safety, mobility, therex, spine precautions, log rolling technique, and OOB 3-5 times. Pt has functional B LE strength but decreased on L LE compared to R LE and good sitting/standing balance. Pt is independent and safe with bed mobility, transfers, and gait. Pt educated on safety while in the hospital and at home. Pt ended therapy sitting in chair with all needs met. Pt cleared by PT, nursing informed. Patient does not require further skilled physical therapy at this level of care. PLAN :  Recommendations and Planned Interventions:    No skilled inpatient physical therapy needs identified at this time. Discharge Recommendations: Home Health  Further Equipment Recommendations for Discharge: N/A     SUBJECTIVE:   Patient stated Giovanni Brown had to call to cancel my dry needling appointment today.     OBJECTIVE DATA SUMMARY:     Past Medical History:   Diagnosis Date    Cancer [facility])     skin at age 8, Thyroid 2005, Endometrial 2012    Chronic back pain     Sciatica of left side     Thyroid disease     hypothyroid     Past Surgical History:   Procedure Laterality Date    ABDOMEN SURGERY PROC UNLISTED  2017    cholecystectomy    HX DILATION AND CURETTAGE  [id number]    x2    HX HYSTERECTOMY      HX LUMBAR LAMINECTOMY  9/17/15    Paula Lank HX PARTIAL THYROIDECTOMY  2005     Barriers to Learning/Limitations: None  Compensate with: N/A  Home Situation:   Home Situation  Home Environment: Private residence  # Steps to Enter: 3  Rails to Enter: Yes  Hand Rails : Bilateral  One/Two Story Residence: One story  Living Alone: No  Support Systems: Family member(s), Spouse/Significant Other/Partner  Patient Expects to be Discharged to[de-identified] Private residence  Current DME Used/Available at Home: Minor Leech, rollator, Cane, straight  Critical Behavior:  Neurologic State: Alert  Orientation Level: Oriented X4  Cognition: Follows commands; Appropriate decision making  Safety/Judgement: Fall prevention; Awareness of environment  Psychosocial  Patient Behaviors: Calm; Cooperative  Purposeful Interaction: Yes  Skin Condition/Temp: Dry;Warm     Skin Integrity: Incision (comment)(back)  Skin Integumentary  Skin Color: Appropriate for ethnicity  Skin Condition/Temp: Dry;Warm  Skin Integrity: Incision (comment)(back)     B LE Strength:    Strength: (R LE 5/5 L LE ankle 3-/5 knee and hip 4/5)              B LE Tone & Sensation:   Tone: Normal          Sensation: Impaired(Left side L4)           B LE Range Of Motion:  AROM: Within functional limits                 Posture:  Posture (WDL): Within defined limits     Functional Mobility:  Bed Mobility:     Supine to Sit: (pt reports no issues with log roll)        Transfers:  Sit to Stand: Modified independent  Stand to Sit: Modified independent                       Balance:   Sitting: Intact  Standing: Intact    Ambulation/Gait Training:  Gait Description (WDL): Within defined limits(250 feet no AD no LOB)          Stairs:  Number of Stairs Trained: 4  Stairs - Level of Assistance: Modified independent  Rail Use: Right      Therapeutic Exercises:   Reviewed and performed ankle pumps to increase blood flow and circulation.     Pain:  Pain level pre-treatment: 5-6/10 back  Pain level post-treatment: 5-6/10 back  Pain Intervention(s): Medication (see MAR); Rest, Ice, Repositioning   Response to intervention: Nurse notified, See doc flow    Activity Tolerance:   good  Please refer to the flowsheet for vital signs taken during this treatment. After treatment:   [x]         Patient left in no apparent distress sitting up in chair  []         Patient left in no apparent distress in bed  [x]         Call bell left within reach  [x]   Personal items in reach  [x]         Nursing notified Sly Stewartt   []         Caregiver present  []         Bed/chair alarm activated  []         SCDs applied       COMMUNICATION/EDUCATION:   [x]         Role of Physical Therapy in the acute care setting. [x]         Fall prevention education was provided and the patient/caregiver indicated understanding. [x]         Patient/family have participated as able in goal setting and plan of care. [x]         Patient/family agree to work toward stated goals and plan of care. []         Patient understands intent and goals of therapy, but is neutral about his/her participation. []         Patient is unable to participate in goal setting/plan of care: ongoing with therapy staff. [x]         Out of bed at least 3-5 times a day. []         Other:     Thank you for this referral.  Deion Falcon, PT, DPT   Time Calculation: 11 mins      Eval Complexity: History: MEDIUM  Complexity : 1-2 comorbidities / personal factors will impact the outcome/ POC Exam:HIGH Complexity : 4+ Standardized tests and measures addressing body structure, function, activity limitation and / or participation in recreation  Presentation: LOW Complexity : Stable, uncomplicated  Clinical Decision Making:Low Complexity    Overall Complexity:LOW

## 2019-12-20 NOTE — PROGRESS NOTES
Reason for Admission:  HNP (herniated nucleus pulposus), lumbar [M51.26]  HNP (herniated nucleus pulposus), lumbar [M51.26]                 RRAT Score:    5           Plan for utilizing home health: [address] [place] [address]                    Likelihood of Readmission:   LOW                         Transition of Care Plan:              Initial assessment completed with patient. Cognitive status of patient: oriented to time, place, person and situation. Face sheet information confirmed:  yes. The patient designates Vanita Or, spouse ([phone] to participate in her discharge plan and to receive any needed information. This patient lives in a single family home with spouse. Patient is able to navigate steps as needed. Prior to hospitalization, patient was considered to be independent with ADLs/IADLS : yes . I    Patient has a current ACP document on file: no  The patient and spouse will be available to transport patient home upon discharge. The patient already has Harvinder Brown, and rollator medical equipment available in the home. Patient is not currently active with home health. Patient has not stayed in a skilled nursing facility or rehab. .      This patient is on dialysis :no      List of available Home Health agencies were provided and reviewed with the patient prior to discharge. Freedom of choice signed: yes, for [address] [place] [address] and referral sent. Currently, the discharge plan is Home with [address] [place] [address]. Patient's PCP is with [facility]. Patient doesn't remember PCP's name. The patient states that she can obtain her medications from the pharmacy, and take her medications as directed. Patient's current insurance is [employer]       Care Management Interventions  PCP Verified by CM:  Yes  Last Visit to PCP: 12/13/19  Mode of Transport at Discharge: Self  Transition of Care Consult (CM Consult): Discharge Planning, [address]: Yes  MyChart Signup: No  Discharge Durable Medical Equipment: No  Physical Therapy Consult: Yes  Occupational Therapy Consult: Yes  Speech Therapy Consult: No  Current Support Network: Lives with Spouse  Confirm Follow Up Transport: Self  The Plan for Transition of Care is Related to the Following Treatment Goals : home with home health  The Patient and/or Patient Representative was Provided with a Choice of Provider and Agrees with the Discharge Plan?: Yes  Freedom of Choice List was Provided with Basic Dialogue that Supports the Patient's Individualized Plan of Care/Goals, Treatment Preferences and Shares the Quality Data Associated with the Providers?: Yes  Discharge Location  Discharge Placement: Home with home health        NILE Tavarez, RN  Pager # [phone]  Care Manager

## 2019-12-20 NOTE — ROUTINE PROCESS
Bedside and Verbal shift change report given to Kenan Newman RN (oncoming nurse) by Analisa Kiran RN (offgoing nurse). Report included the following information Kardex and MAR.  
 
0700 End of Shift Note Bedside and verbal shift change report given to Analisa Kiran RN (On coming nurse) by Kenan Newman RN (Off going nurse). Report included the following information:  
   --Procedure Summary 
   --MAR, 
   --Recent Results --Med Rec Status SBAR Recommendations: none Issues for Provider to address none Activity This Shift 
 
 [] Bed Rest Order 
 [] Refused 
 [] Dangled  
 [] TDWB Ambulating: 
   [x] Bathroom [] BSC [x] Room/Hallway Up in Chair for meals 
  [x]Yes [] No  
Voiding       [x] Yes  [] No 
Muñoz          [] Yes  [x] No 
Incontinent [] Yes  [x] No 
 
DUE TO VOID dibe POUR        [] Yes [x] No 
Purewick    [] Yes [x] No 
New Onset [] Yes [x] No Straight Cath   []Yes  [x] No 
Condom Cath  [] Yes [x] No 
MD Called      [] Yes  [x] No  
Blood Sugars Managed []Yes [x] No   
Bowels Moved [] Yes [x] No 
 
Incontinent     [] Yes [x] No Passed Gas [x]Yes [] No 
 
New Onset  []Yes [x] No 
  
 
 MD Called []Yes  [x] No 
  
CHG Bath Done Before Surgery After Surgery  
  
[x] Yes  [] No 
[x] Yes  [] No   
  
Drain Removed [] Yes  [] No [x] N/A Dressing Changed [] Yes   [x] No [] N/A Nausea/Vomiting [] Yes   [x] No    
Ice Packs Changed [x] Yes   [] No  [] N/A Incentive Spirometer  [x] Yes  [] No     
SCD Pumps On Ankle Pumping  [x] Yes   [] No  
  
[] Yes   [x] No    
  
Telemetry Monitoring [] Yes   [x] No   Rhythm N/A

## 2019-12-20 NOTE — PROGRESS NOTES
Problem: Infection - Risk of, Surgical Site Infection  Goal: *Absence of surgical site infection signs and symptoms  Outcome: Progressing Towards Goal     Problem: Patient Education: Go to Patient Education Activity  Goal: Patient/Family Education  Outcome: Progressing Towards Goal     Problem: Falls - Risk of  Goal: *Absence of Falls  Description  Document [facility] with [facility] Fall Risk and appropriate interventions in the flowsheet.   Outcome: Progressing Towards Goal  Note: Fall Risk Interventions:                                Problem: Patient Education: Go to Patient Education Activity  Goal: Patient/Family Education  Outcome: Progressing Towards Goal

## 2019-12-20 NOTE — PROGRESS NOTES
Bedside shift change report given to Sydney Platt (oncoming nurse) by Yuriy Pierre (offgoing nurse). Report included the following information SBAR, Kardex, Procedure Summary, Intake/Output and MAR.

## 2019-12-20 NOTE — PROGRESS NOTES
Problem: Infection - Risk of, Surgical Site Infection  Goal: *Absence of surgical site infection signs and symptoms  Outcome: Progressing Towards Goal

## 2019-12-20 NOTE — PROGRESS NOTES
OCCUPATIONAL THERAPY EVALUATION/DISCHARGE    Patient: Griselda Kern (18 y.o. female)  Date: 12/20/2019  Primary Diagnosis: HNP (herniated nucleus pulposus), lumbar [M51.26]  HNP (herniated nucleus pulposus), lumbar [M51.26]  Procedure(s) (LRB):  SPINE LUMBAR LAMINECTOMY/DISCECTOMY LEFT L 4/5 REDO (Left) 1 Day Post-Op   Precautions:   Back  PLOF: Pt was modified independent with basic self care tasks and used a rollator at times for functional mobility secondary to sciatic nerve pain PTA. ASSESSMENT AND RECOMMENDATIONS:  Based on the objective data described below, the patient is able to perform basic self care tasks without assistance using AE (LHS) given after demonstration/practice. She has a reacher and sock aid at home but is able to bring her LEs up to her body without bending. Back precautions reviewed and patient verbalized/demonstrated understanding. She has a supportive  at home to assist her prn. Discussed use of a seat in the shower for safety and patient to purchase upon discharge if needed. Skilled occupational therapy is not indicated at this time. Discharge Recommendations: None  Further Equipment Recommendations for Discharge: N/A      SUBJECTIVE:   Patient stated I've been up to the bathroom like 6 times.     OBJECTIVE DATA SUMMARY:     Past Medical History:   Diagnosis Date    Cancer ([address]     skin at age 8, Thyroid 2005, Endometrial 2012    Chronic back pain     Sciatica of left side     Thyroid disease     hypothyroid     Past Surgical History:   Procedure Laterality Date    ABDOMEN SURGERY PROC UNLISTED  2017    cholecystectomy    HX DILATION AND CURETTAGE  2010,2012    x2    HX HYSTERECTOMY      HX LUMBAR LAMINECTOMY  9/17/15    Melvinia Mom PARTIAL THYROIDECTOMY  2005     Barriers to Learning/Limitations: None  Compensate with: visual, verbal, tactile, kinesthetic cues/model    Home Situation:   Home Situation  Home Environment: Private residence  # Steps to Enter: 3  Rails to Enter: Yes  Hand Rails : Bilateral  One/Two Story Residence: One story  Living Alone: No  Support Systems: Family member(s), Spouse/Significant Other/Partner  Patient Expects to be Discharged to[de-identified] Private residence  Current DME Used/Available at Home: Samuella Land, rollator, Cane, straight  Tub or Shower Type: Tub/Shower combination  [x]     Right hand dominant   []     Left hand dominant    Cognitive/Behavioral Status:  Neurologic State: Alert  Orientation Level: Oriented X4  Cognition: Appropriate decision making; Follows commands  Safety/Judgement: Awareness of environment; Fall prevention    Skin: Intact on UEs  Edema: None noted in UEs    Vision/Perceptual:     Acuity: Within Defined Limits      Coordination: BUE  Coordination: Generally decreased, functional  Fine Motor Skills-Upper: Left Intact; Right Intact    Gross Motor Skills-Upper: Left Intact; Right Intact    Balance:  Sitting: Intact  Standing: Intact    Strength: BUE  Strength: Within functional limits(within back precautions)    Tone & Sensation: BUE  Tone: Normal  Sensation: Intact    Range of Motion: BUE  AROM: Within functional limits    Functional Mobility and Transfers for ADLs:  Bed Mobility:  Supine to Sit: (pt reports no issues with log roll)    Transfers:  Sit to Stand: Modified independent  Stand to Sit: Modified independent   Toilet Transfer : Modified independent    ADL Assessment:  Feeding: Independent    Oral Facial Hygiene/Grooming: Independent    Bathing: Modified independent    Upper Body Dressing: Independent    Lower Body Dressing: Modified independent    Toileting: Modified independent    ADL Intervention:  Patient practiced UB/LB bathing (CHG wipes) and dressing while seated and in standing. She was able to bring her LEs up to her body without bending to complete LB self care. A long handled sponge was given for bathing while adhering to precautions. No assist given after initial VCs for safety/ease of performance.   She was modified independent with standing balance. No LOB noted. Cognitive Retraining  Safety/Judgement: Awareness of environment; Fall prevention    Pain:  Pain level pre-treatment: 5/10, aching in back   Pain level post-treatment: 5/10, aching in back   Pain Intervention(s): Medication (see MAR); Rest, Ice, Repositioning  Response to intervention: Nurse notified, See doc flow    Activity Tolerance:   Good  Please refer to the flowsheet for vital signs taken during this treatment. After treatment:   [x]  Patient left in no apparent distress sitting up in chair  []  Patient left in no apparent distress in bed  [x]  Call bell left within reach  [x]  Nursing notified  []  Caregiver present  []  Bed alarm activated    COMMUNICATION/EDUCATION:   [x]      Role of Occupational Therapy in the acute care setting  [x]      Home safety education was provided and the patient/caregiver indicated understanding. [x]      Patient/family have participated as able and agree with findings and recommendations. []      Patient is unable to participate in plan of care at this time. Thank you for this referral.  Analisa Westfall MS OTR/L   Time Calculation: 26 mins      Eval Complexity: History: LOW Complexity : Brief history review ; Examination: LOW Complexity : 1-3 performance deficits relating to physical, cognitive , or psychosocial skils that result in activity limitations and / or participation restrictions ;    Decision Making:LOW Complexity : No comorbidities that affect functional and no verbal or physical assistance needed to complete eval tasks

## 2019-12-20 NOTE — HOME CARE
Rec HC order - D/C noted for today [facility] will follow per Dr. Niyah Almeida protocol - SN/PT - D Sara HARDEN

## 2019-12-20 NOTE — PROGRESS NOTES
Discharge planning    Discharge order noted for today. Referral sent to [facility] agency. Met with patient and agreeable to the transition plan today. Transport has been arranged with spouse. Patient's discharge summary and home health  orders have been forwarded to [facility] health  agency via [employer]. Updated bedside RN, [place],  to the transition plan.   Discharge information has been documented on the AVS.       NILE Tavarez, RN  Pager # [phone]  Care Manager

## 2019-12-20 NOTE — DISCHARGE INSTRUCTIONS
[facility]ative [facility] Instructions [facility]  [phone]      At your 2-week post-operative visit we will remove any skin staples or sutures, if present. (Appointment was made at the time you scheduled your surgery)    Call office or physician on-call for any of the following:  · Fever greater than 100.5  · Uncontrollable chills  · Drainage from incision  · Pain not controlled by pain medication  · New pain  · New weakness or progressive weakness  · Food sticking in throat or excessive coughing when swallowing    Stop all anti-inflammatories (arthritis medication) such as Ibuprofen, Motrin, Aleve, Voltaren, Relafen, Naproxen, Arthrotec, etc. for 12 weeks ONLY if you had a neck or back Fusion. You may take Tylenol or acetaminophen over the counter. Take pain medication as ordered. Use ice to your back to help with pain. May remove TERRA stockings when discharged from the hospital.    May shower on the third day after surgery. Leave dressing on while you shower; the dressing is waterproof as long as the edges are sealed. Change dressing after 1 week, or sooner if saturated with drainage. Replace with a gauze dressing. No driving until your first post-operative visit. If you must drive, do not take pain medications that may alter your abilities. Lumbar braces and neck collars are for comfort only. Walking is the best therapy after back surgery. Avoid extremes of bending, twisting, or lifting. All post-operative surgical patients should function within the range of the pain. \"If it hurts - do not do it. \"    Follow your back precautions: No bending, lifting or twisting. Make sure to log roll in and out of bed. Best of luck with your back recovery. [address] YOU for choosing the [facility] for you Spine Surgery.

## 2019-12-21 ENCOUNTER — HOME CARE VISIT (OUTPATIENT)
Dept: SCHEDULING | Facility: HOME HEALTH | Age: 39
End: 2019-12-21
Payer: OTHER GOVERNMENT

## 2019-12-21 PROCEDURE — 3331090002 HH PPS REVENUE DEBIT

## 2019-12-21 PROCEDURE — G0299 HHS/HOSPICE OF RN EA 15 MIN: HCPCS

## 2019-12-21 PROCEDURE — 3331090001 HH PPS REVENUE CREDIT

## 2019-12-21 PROCEDURE — G0151 HHCP-SERV OF PT,EA 15 MIN: HCPCS

## 2019-12-21 PROCEDURE — 400013 HH SOC

## 2019-12-22 ENCOUNTER — HOME CARE VISIT (OUTPATIENT)
Dept: SCHEDULING | Facility: HOME HEALTH | Age: 39
End: 2019-12-22
Payer: OTHER GOVERNMENT

## 2019-12-22 VITALS
OXYGEN SATURATION: 99 % | SYSTOLIC BLOOD PRESSURE: 124 MMHG | HEART RATE: 93 BPM | DIASTOLIC BLOOD PRESSURE: 76 MMHG | TEMPERATURE: 98.9 F

## 2019-12-22 VITALS
SYSTOLIC BLOOD PRESSURE: 110 MMHG | OXYGEN SATURATION: 97 % | DIASTOLIC BLOOD PRESSURE: 80 MMHG | RESPIRATION RATE: 16 BRPM | HEART RATE: 76 BPM | TEMPERATURE: 97.8 F

## 2019-12-22 PROCEDURE — G0157 HHC PT ASSISTANT EA 15: HCPCS

## 2019-12-22 PROCEDURE — 3331090001 HH PPS REVENUE CREDIT

## 2019-12-22 PROCEDURE — 3331090002 HH PPS REVENUE DEBIT

## 2019-12-23 ENCOUNTER — HOME CARE VISIT (OUTPATIENT)
Dept: SCHEDULING | Facility: HOME HEALTH | Age: 39
End: 2019-12-23
Payer: OTHER GOVERNMENT

## 2019-12-23 VITALS
OXYGEN SATURATION: 98 % | SYSTOLIC BLOOD PRESSURE: 122 MMHG | RESPIRATION RATE: 16 BRPM | DIASTOLIC BLOOD PRESSURE: 70 MMHG | TEMPERATURE: 98 F | HEART RATE: 90 BPM

## 2019-12-23 PROCEDURE — G0157 HHC PT ASSISTANT EA 15: HCPCS

## 2019-12-23 PROCEDURE — 3331090001 HH PPS REVENUE CREDIT

## 2019-12-23 PROCEDURE — 3331090002 HH PPS REVENUE DEBIT

## 2019-12-23 PROCEDURE — A6258 TRANSPARENT FILM >16<=48 IN: HCPCS

## 2019-12-24 ENCOUNTER — HOME CARE VISIT (OUTPATIENT)
Dept: SCHEDULING | Facility: HOME HEALTH | Age: 39
End: 2019-12-24
Payer: OTHER GOVERNMENT

## 2019-12-24 ENCOUNTER — HOME CARE VISIT (OUTPATIENT)
Dept: HOME HEALTH SERVICES | Facility: HOME HEALTH | Age: 39
End: 2019-12-24
Payer: OTHER GOVERNMENT

## 2019-12-24 PROCEDURE — 3331090001 HH PPS REVENUE CREDIT

## 2019-12-24 PROCEDURE — G0157 HHC PT ASSISTANT EA 15: HCPCS

## 2019-12-24 PROCEDURE — G0300 HHS/HOSPICE OF LPN EA 15 MIN: HCPCS

## 2019-12-24 PROCEDURE — 3331090002 HH PPS REVENUE DEBIT

## 2019-12-25 VITALS
DIASTOLIC BLOOD PRESSURE: 84 MMHG | HEART RATE: 88 BPM | TEMPERATURE: 98.7 F | OXYGEN SATURATION: 98 % | SYSTOLIC BLOOD PRESSURE: 140 MMHG

## 2019-12-25 VITALS
SYSTOLIC BLOOD PRESSURE: 126 MMHG | HEART RATE: 90 BPM | TEMPERATURE: 98.9 F | OXYGEN SATURATION: 98 % | DIASTOLIC BLOOD PRESSURE: 90 MMHG

## 2019-12-25 PROCEDURE — 3331090001 HH PPS REVENUE CREDIT

## 2019-12-25 PROCEDURE — 3331090002 HH PPS REVENUE DEBIT

## 2019-12-26 ENCOUNTER — HOME CARE VISIT (OUTPATIENT)
Dept: SCHEDULING | Facility: HOME HEALTH | Age: 39
End: 2019-12-26
Payer: OTHER GOVERNMENT

## 2019-12-26 ENCOUNTER — TELEPHONE (OUTPATIENT)
Dept: ORTHOPEDIC SURGERY | Age: 39
End: 2019-12-26

## 2019-12-26 VITALS
SYSTOLIC BLOOD PRESSURE: 140 MMHG | HEART RATE: 78 BPM | DIASTOLIC BLOOD PRESSURE: 110 MMHG | OXYGEN SATURATION: 98 % | TEMPERATURE: 98.1 F

## 2019-12-26 VITALS
OXYGEN SATURATION: 99 % | DIASTOLIC BLOOD PRESSURE: 76 MMHG | RESPIRATION RATE: 16 BRPM | TEMPERATURE: 99 F | HEART RATE: 78 BPM | SYSTOLIC BLOOD PRESSURE: 124 MMHG

## 2019-12-26 PROCEDURE — G0299 HHS/HOSPICE OF RN EA 15 MIN: HCPCS

## 2019-12-26 PROCEDURE — 3331090001 HH PPS REVENUE CREDIT

## 2019-12-26 PROCEDURE — G0157 HHC PT ASSISTANT EA 15: HCPCS

## 2019-12-26 PROCEDURE — 3331090002 HH PPS REVENUE DEBIT

## 2019-12-26 NOTE — TELEPHONE ENCOUNTER
Sabrina Dao with [facility] calling as the patient blood pressure has been very high. Taking today with no acitivity 142/100 just sitting,   with recline position 136-92 in recliner. Sabrina Dao had to due after a few standing exercises 150-120. And after she recliner and it dropped to 140-110. Patient BP has been jessica since Philippe Shaw. Sabrina Dao has been watching to make sure it was not due to smoking or salt intake. Please call Sabrina Dao back at [phone] to instructed her about the patient going to PCP or other advice of treatment.

## 2019-12-27 ENCOUNTER — HOME CARE VISIT (OUTPATIENT)
Dept: SCHEDULING | Facility: HOME HEALTH | Age: 39
End: 2019-12-27
Payer: OTHER GOVERNMENT

## 2019-12-27 ENCOUNTER — APPOINTMENT (OUTPATIENT)
Dept: PHYSICAL THERAPY | Age: 39
End: 2019-12-27
Payer: OTHER GOVERNMENT

## 2019-12-27 VITALS
HEART RATE: 76 BPM | OXYGEN SATURATION: 98 % | SYSTOLIC BLOOD PRESSURE: 120 MMHG | RESPIRATION RATE: 16 BRPM | DIASTOLIC BLOOD PRESSURE: 70 MMHG | TEMPERATURE: 97.4 F

## 2019-12-27 VITALS
OXYGEN SATURATION: 98 % | SYSTOLIC BLOOD PRESSURE: 140 MMHG | RESPIRATION RATE: 20 BRPM | TEMPERATURE: 98.1 F | HEART RATE: 100 BPM | DIASTOLIC BLOOD PRESSURE: 100 MMHG

## 2019-12-27 PROCEDURE — 3331090001 HH PPS REVENUE CREDIT

## 2019-12-27 PROCEDURE — G0151 HHCP-SERV OF PT,EA 15 MIN: HCPCS

## 2019-12-27 PROCEDURE — 3331090002 HH PPS REVENUE DEBIT

## 2019-12-27 NOTE — TELEPHONE ENCOUNTER
Spoke with Funmilayo Maria, informed of NP Kaushal message below. She stated she had informed the patient to schedule something with her Primary. No further actions needed at this time.

## 2019-12-28 PROCEDURE — 3331090002 HH PPS REVENUE DEBIT

## 2019-12-28 PROCEDURE — 3331090001 HH PPS REVENUE CREDIT

## 2019-12-29 PROCEDURE — 3331090001 HH PPS REVENUE CREDIT

## 2019-12-29 PROCEDURE — 3331090002 HH PPS REVENUE DEBIT

## 2019-12-30 PROCEDURE — 3331090002 HH PPS REVENUE DEBIT

## 2019-12-30 PROCEDURE — 3331090001 HH PPS REVENUE CREDIT

## 2019-12-31 ENCOUNTER — HOME CARE VISIT (OUTPATIENT)
Dept: SCHEDULING | Facility: HOME HEALTH | Age: 39
End: 2019-12-31
Payer: OTHER GOVERNMENT

## 2019-12-31 VITALS
OXYGEN SATURATION: 99 % | RESPIRATION RATE: 20 BRPM | TEMPERATURE: 98.3 F | HEART RATE: 92 BPM | SYSTOLIC BLOOD PRESSURE: 132 MMHG | DIASTOLIC BLOOD PRESSURE: 88 MMHG

## 2019-12-31 PROCEDURE — 3331090001 HH PPS REVENUE CREDIT

## 2019-12-31 PROCEDURE — 3331090002 HH PPS REVENUE DEBIT

## 2019-12-31 PROCEDURE — G0300 HHS/HOSPICE OF LPN EA 15 MIN: HCPCS

## 2020-01-01 PROCEDURE — 3331090002 HH PPS REVENUE DEBIT

## 2020-01-01 PROCEDURE — 3331090001 HH PPS REVENUE CREDIT

## 2020-01-02 ENCOUNTER — HOME CARE VISIT (OUTPATIENT)
Dept: SCHEDULING | Facility: HOME HEALTH | Age: 40
End: 2020-01-02
Payer: OTHER GOVERNMENT

## 2020-01-02 VITALS
HEART RATE: 80 BPM | RESPIRATION RATE: 20 BRPM | OXYGEN SATURATION: 99 % | SYSTOLIC BLOOD PRESSURE: 138 MMHG | DIASTOLIC BLOOD PRESSURE: 84 MMHG | TEMPERATURE: 98.6 F

## 2020-01-02 PROCEDURE — 3331090002 HH PPS REVENUE DEBIT

## 2020-01-02 PROCEDURE — G0300 HHS/HOSPICE OF LPN EA 15 MIN: HCPCS

## 2020-01-02 PROCEDURE — 3331090001 HH PPS REVENUE CREDIT

## 2020-01-03 ENCOUNTER — APPOINTMENT (OUTPATIENT)
Dept: PHYSICAL THERAPY | Age: 40
End: 2020-01-03

## 2020-01-03 PROCEDURE — A6204 COMPOSITE DRSG >16<=48 SQ IN: HCPCS

## 2020-01-03 PROCEDURE — 3331090002 HH PPS REVENUE DEBIT

## 2020-01-03 PROCEDURE — 3331090001 HH PPS REVENUE CREDIT

## 2020-01-04 PROCEDURE — 3331090002 HH PPS REVENUE DEBIT

## 2020-01-04 PROCEDURE — 3331090001 HH PPS REVENUE CREDIT

## 2020-01-05 PROCEDURE — 3331090002 HH PPS REVENUE DEBIT

## 2020-01-05 PROCEDURE — 3331090001 HH PPS REVENUE CREDIT

## 2020-01-06 ENCOUNTER — OFFICE VISIT (OUTPATIENT)
Dept: ORTHOPEDIC SURGERY | Age: 40
End: 2020-01-06

## 2020-01-06 VITALS
DIASTOLIC BLOOD PRESSURE: 98 MMHG | WEIGHT: 274 LBS | OXYGEN SATURATION: 100 % | HEART RATE: 66 BPM | SYSTOLIC BLOOD PRESSURE: 144 MMHG | BODY MASS INDEX: 41.52 KG/M2 | TEMPERATURE: 98.3 F | HEIGHT: 68 IN

## 2020-01-06 DIAGNOSIS — Z98.890 S/P LUMBAR LAMINECTOMY: Primary | ICD-10-CM

## 2020-01-06 PROCEDURE — 3331090001 HH PPS REVENUE CREDIT

## 2020-01-06 PROCEDURE — 3331090002 HH PPS REVENUE DEBIT

## 2020-01-06 NOTE — PROGRESS NOTES
Chief complaint/History of Present Illness:  No chief complaint on file. CRIS Tamayo is a  44 y.o.  female      HISTORY OF PRESENT ILLNESS:  The patient comes in today two weeks status post a redo left L4-5 laminectomy discectomy. She states her left leg pain is resolved. She is very happy with that. She still has some partial numbness in the middle three toes in one small area around her lateral lower leg, but she is very happy with the outcome of the surgery. She is off all pain medication. She denies fever and bowel and bladder dysfunction. She is trying to quit smoking. She is down to two cigarettes per day. She was a one-fourth pack per day. She works as a  and wishes to return back to work in about two weeks. She denies fever and bowel and bladder dysfunction. PHYSICAL EXAM:  Ms. Donya Barron is a 70-year-old female. She is alert and oriented. She has a normal mood and affect. She has a full weightbearing, non-antalgic gait using no assistive device. She has 4/5 strength of the bilateral lower extremities and negative straight leg raise. Her posterior lumbar incision is healing nicely. The edge is well-approximated. There is no erythema, warmth, drainage, or signs of infection. ASSESSMENT/PLAN:  This is a patient two weeks out from her redo left L4-5 laminectomy discectomy done December 19, 2019. She is doing well. We went over wound care and activity level. We will see her back in four weeks with Dr. Micheline Hampton.         Review of systems:    Past Medical History:   Diagnosis Date    Cancer [facility])     skin at age 8, Thyroid 2005, Endometrial 2012    Chronic back pain     Sciatica of left side     Thyroid disease     hypothyroid     Past Surgical History:   Procedure Laterality Date    ABDOMEN SURGERY PROC UNLISTED  2017    cholecystectomy    HX DILATION AND CURETTAGE  4061,7472    x2    HX HYSTERECTOMY      HX LUMBAR LAMINECTOMY  9/17/15    Sanjuanita De PARTIAL THYROIDECTOMY  2005     Social History     Socioeconomic History    Marital status: UNKNOWN     Spouse name: Not on file    Number of children: Not on file    Years of education: Not on file    Highest education level: Not on file   Occupational History    Not on file   Social Needs    Financial resource strain: Not on file    Food insecurity:     Worry: Not on file     Inability: Not on file    Transportation needs:     Medical: Not on file     Non-medical: Not on file   Tobacco Use    Smoking status: Light Tobacco Smoker     Packs/day: 0.25     Types: Cigarettes    Smokeless tobacco: Never Used    Tobacco comment: 1/4 pack weekly   Substance and Sexual Activity    Alcohol use:  Yes     Alcohol/week: 2.0 standard drinks     Types: 2 Shots of liquor per week     Comment: monthly    Drug use: No    Sexual activity: Not on file   Lifestyle    Physical activity:     Days per week: Not on file     Minutes per session: Not on file    Stress: Not on file   Relationships    Social connections:     Talks on phone: Not on file     Gets together: Not on file     Attends Church service: Not on file     Active member of club or organization: Not on file     Attends meetings of clubs or organizations: Not on file     Relationship status: Not on file    Intimate partner violence:     Fear of current or ex partner: Not on file     Emotionally abused: Not on file     Physically abused: Not on file     Forced sexual activity: Not on file   Other Topics Concern    Not on file   Social History Narrative    Not on file     Family History   Problem Relation Age of Onset    No Known Problems Mother     No Known Problems Father        Physical Exam:  Visit Vitals  BP (!) 144/98 (BP 1 Location: Left arm, BP Patient Position: Sitting)   Pulse 66   Temp 98.3 °F (36.8 °C) (Oral)   Ht 5' 8\" (1.727 m)   Wt 274 lb (124.3 kg)   LMP 09/09/2015   SpO2 100%   BMI 41.66 kg/m²     Pain Scale: 0 - No pain/10       has been .reviewed and is appropriate          Diagnoses and all orders for this visit:    1. S/P lumbar laminectomy            Follow-up and Dispositions    · Return in about 4 weeks (around 2/3/2020) for with Dr. Heidi Ramírez.              We have informed Benson [place]ocks to notify us for immediate appointment if she has any worsening neurogical symptoms or if an emergency situation presents, then call [phone]

## 2020-01-07 ENCOUNTER — HOME CARE VISIT (OUTPATIENT)
Dept: HOME HEALTH SERVICES | Facility: HOME HEALTH | Age: 40
End: 2020-01-07
Payer: OTHER GOVERNMENT

## 2020-01-07 PROCEDURE — 3331090002 HH PPS REVENUE DEBIT

## 2020-01-07 PROCEDURE — 3331090001 HH PPS REVENUE CREDIT

## 2020-01-08 ENCOUNTER — HOME CARE VISIT (OUTPATIENT)
Dept: SCHEDULING | Facility: HOME HEALTH | Age: 40
End: 2020-01-08
Payer: OTHER GOVERNMENT

## 2020-01-08 VITALS
RESPIRATION RATE: 20 BRPM | TEMPERATURE: 97.5 F | DIASTOLIC BLOOD PRESSURE: 82 MMHG | HEART RATE: 68 BPM | SYSTOLIC BLOOD PRESSURE: 128 MMHG | OXYGEN SATURATION: 98 %

## 2020-01-08 PROCEDURE — 3331090003 HH PPS REVENUE ADJ

## 2020-01-08 PROCEDURE — 3331090001 HH PPS REVENUE CREDIT

## 2020-01-08 PROCEDURE — 3331090002 HH PPS REVENUE DEBIT

## 2020-01-08 PROCEDURE — G0299 HHS/HOSPICE OF RN EA 15 MIN: HCPCS

## 2020-01-16 NOTE — PROGRESS NOTES
[address] [facility] [address] [facility] AT [address]. [facility]  Phone: [phone] Fax: [phone] SUMMARY   Patient Name: Leopold Pulley : 1980   Treatment/Medical Diagnosis: Lower back pain [M54.5]   Referral Source: Melly Mike MD     Date of Initial Visit: 19 Attended Visits: 23 Missed Visits: 1     SUMMARY OF TREATMENT    Pt is a 45 y. o. year old female who presents with co LS pain with L LE radiculopathy and tightness/muscle spasms and numbness of posterior chain starting with insidious onset late April. Hx LS discectomy in . Treatment has included IMT, therex and traction. CURRENT STATUS  Patient had progressively elevating pain levels and LE weakness resulting in LS surgery on 19. DC at this time. If you have any questions/comments please contact us directly at [phone]   Thank you for allowing us to assist in the care of your patient.   Therapist Signature: Evans Dewey PT Date: 2020     Time: 29-45-37-51

## 2020-01-31 ENCOUNTER — OFFICE VISIT (OUTPATIENT)
Dept: ORTHOPEDIC SURGERY | Age: 40
End: 2020-01-31

## 2020-01-31 VITALS
OXYGEN SATURATION: 100 % | WEIGHT: 273.8 LBS | HEART RATE: 78 BPM | TEMPERATURE: 98.4 F | SYSTOLIC BLOOD PRESSURE: 146 MMHG | HEIGHT: 68 IN | RESPIRATION RATE: 20 BRPM | BODY MASS INDEX: 41.49 KG/M2 | DIASTOLIC BLOOD PRESSURE: 84 MMHG

## 2020-01-31 DIAGNOSIS — M51.26 HNP (HERNIATED NUCLEUS PULPOSUS), LUMBAR: ICD-10-CM

## 2020-01-31 DIAGNOSIS — Z98.890 S/P LUMBAR LAMINECTOMY: Primary | ICD-10-CM

## 2020-01-31 NOTE — PROGRESS NOTES
[address]  [facility], [address]  Phone: [phone]  Fax: [phone]  PROGRESS NOTE  Patient: Carrie Garcia                MRN: [id number]       SSN: [id number]  YOB: 1980        AGE: 44 y.o. SEX: female  Body mass index is 41.63 kg/m². PCP: Malissa Henao MD  01/31/20    Chief Complaint   Patient presents with    LOW BACK PAIN    Surgical Follow-up       HISTORY OF PRESENT ILLNESS, RADIOGRAPHS, and PLAN:     HISTORY OF PRESENT ILLNESS:  Ms. Eva Pettit returns today. She is six weeks out from her revision lumbar decompression. She is pain-free. She has no leg pain. Her back pain is a 3/10. Her wound is healed and dry. She is thrilled by her outcome. ASSESSMENT/PLAN: We went over dos and donts, core strengthening, stretching, and activity modification. We will see her back in six weeks or as needed. cc: Jhonny Champion M.D. Past Medical History:   Diagnosis Date    Cancer [facility])     skin at age 8, Thyroid 2005, Endometrial 2012    Chronic back pain     Sciatica of left side     Thyroid disease     hypothyroid       Family History   Problem Relation Age of Onset    No Known Problems Mother     No Known Problems Father        Current Outpatient Medications   Medication Sig Dispense Refill    levothyroxine (SYNTHROID) 175 mcg tablet Take 175 mcg by mouth Daily (before breakfast).  oxycodone HCl (OXYCODONE PO) Take 1 Tab by mouth every four (4) hours as needed for Pain.  cyclobenzaprine (FLEXERIL) 10 mg tablet Take 10 mg by mouth three (3) times daily as needed for Muscle Spasm(s).  diazePAM (VALIUM) 5 mg tablet Take 5 mg by mouth every six (6) hours as needed for Anxiety.  lidocaine (LIDODERM) 5 %       IBUPROFEN (ADVIL PO) Take  by mouth daily as needed.       HYDROcodone-acetaminophen (NORCO) 5-325 mg per tablet Take 1 Tab by mouth four (4) times daily as needed for Pain. Max Daily Amount: 4 Tabs. 40 Tab 0    levothyroxine (SYNTHROID) 125 mcg tablet Take 175 mcg by mouth Daily (before breakfast). Allergies   Allergen Reactions    Morphine Nausea and Vomiting    Prednisone Unknown (comments)     Leg cramps  Steroid injections ok    Sulfa (Sulfonamide Antibiotics) Hives       Past Surgical History:   Procedure Laterality Date    ABDOMEN SURGERY PROC UNLISTED  2017    cholecystectomy    HX DILATION AND CURETTAGE  6036,1171    x2    HX HYSTERECTOMY      HX LUMBAR LAMINECTOMY  9/17/15    Arthurine Cord HX PARTIAL THYROIDECTOMY  2005       Past Medical History:   Diagnosis Date    Cancer [facility])     skin at age 8, Thyroid 2005, Endometrial 2012    Chronic back pain     Sciatica of left side     Thyroid disease     hypothyroid       Social History     Socioeconomic History    Marital status: UNKNOWN     Spouse name: Not on file    Number of children: Not on file    Years of education: Not on file    Highest education level: Not on file   Occupational History    Not on file   Social Needs    Financial resource strain: Not on file    Food insecurity:     Worry: Not on file     Inability: Not on file    Transportation needs:     Medical: Not on file     Non-medical: Not on file   Tobacco Use    Smoking status: Light Tobacco Smoker     Packs/day: 0.25     Types: Cigarettes    Smokeless tobacco: Never Used    Tobacco comment: 1/4 pack weekly   Substance and Sexual Activity    Alcohol use:  Yes     Alcohol/week: 2.0 standard drinks     Types: 2 Shots of liquor per week     Comment: monthly    Drug use: No    Sexual activity: Not on file   Lifestyle    Physical activity:     Days per week: Not on file     Minutes per session: Not on file    Stress: Not on file   Relationships    Social connections:     Talks on phone: Not on file     Gets together: Not on file     Attends Sabianist service: Not on file     Active member of club or organization: Not on file Attends meetings of clubs or organizations: Not on file     Relationship status: Not on file    Intimate partner violence:     Fear of current or ex partner: Not on file     Emotionally abused: Not on file     Physically abused: Not on file     Forced sexual activity: Not on file   Other Topics Concern    Not on file   Social History Narrative    Not on file         REVIEW OF SYSTEMS:   CONSTITUTIONAL SYMPTOMS:  Negative. EYES:  Negative. EARS, NOSE, THROAT AND MOUTH:  Negative. CARDIOVASCULAR:  Negative. RESPIRATORY:  Negative. GENITOURINARY: Per HPI. GASTROINTESTINAL:  Per HPI. INTEGUMENTARY (SKIN AND/OR BREAST):  Negative. MUSCULOSKELETAL: Per HPI.   ENDOCRINE/RHEUMATOLOGIC:  Negative. NEUROLOGICAL:  Per HPI. HEMATOLOGIC/LYMPHATIC:  Negative. ALLERGIC/IMMUNOLOGIC:  Negative. PSYCHIATRIC:  Negative. PHYSICAL EXAMINATION:   Visit Vitals  /84 (BP 1 Location: Right arm, BP Patient Position: Sitting)   Pulse 78   Temp 98.4 °F (36.9 °C) (Oral)   Resp 20   Ht 5' 8\" (1.727 m)   Wt 273 lb 12.8 oz (124.2 kg)   LMP 09/09/2015   SpO2 100%   BMI 41.63 kg/m²    PAIN SCALE: 3/10    CONSTITUTIONAL: The patient is in no apparent distress and is alert and oriented x 3. HEENT: Normocephalic. Hearing grossly intact. NECK: Supple and symmetric. no tenderness, or masses were felt. RESPIRATORY: No labored breathing. CARDIOVASCULAR: The carotid pulses were normal. Peripheral pulses were 2+. CHEST: Normal AP diameter and normal contour without any kyphoscoliosis. LYMPHATIC: No lymphadenopathy was appreciated in the neck, axillae or groin. SKIN:  Incision healing well, no drainage, no erythema, no hernia, no seroma, no swelling, no dehiscence, incision well approximated. Negative for scars, rashes, lesions, or ulcers on the right upper, right lower, left upper, left lower and trunk. NEUROLOGICAL: Alert and oriented x 3. Ambulation without assistive device. FWB.   EXTREMITIES: See musculoskeletal.  MUSCULOSKELETAL:   Head and Neck:  Negative for misalignment, asymmetry, crepitation, defects, tenderness masses or effusions.  Left Upper Extremity: Inspection, percussion and palpation performed. Lymans sign is negative.  Right Upper Extremity: Inspection, percussion and palpation performed. Lymans sign is negative.  Spine, Ribs and Pelvis: Inspection, percussion and palpation performed. Negative for misalignment, asymmetry, crepitation, defects, tenderness masses or effusions.  Left Lower Extremity: Inspection, percussion and palpation performed. Negative straight leg raise.  Right Lower Extremity: Inspection, percussion and palpation performed. Negative straight leg raise. SPINE EXAM:     Lumbar spine: No rash, ecchymosis, or gross obliquity. No focal atrophy is noted. ASSESSMENT    ICD-10-CM ICD-9-CM    1. S/P lumbar laminectomy Z98.890 V45.89    2. HNP (herniated nucleus pulposus), lumbar M51.26 722.10        Written by Joshua Lynch, as dictated by Kev Dunlap MD.    I, Dr. Kev Dunlap MD, confirm that all documentation is accurate.

## 2022-09-20 ENCOUNTER — HOSPITAL ENCOUNTER (OUTPATIENT)
Dept: PHYSICAL THERAPY | Age: 42
Discharge: HOME | End: 2022-09-20
Payer: OTHER GOVERNMENT

## 2022-09-20 PROCEDURE — 97530 THERAPEUTIC ACTIVITIES: CPT

## 2022-09-20 PROCEDURE — 97161 PT EVAL LOW COMPLEX 20 MIN: CPT

## 2022-09-20 PROCEDURE — 97112 NEUROMUSCULAR REEDUCATION: CPT

## 2022-09-20 NOTE — PROGRESS NOTES
PT DAILY TREATMENT NOTE     Patient Name: Andrews Crow  Date:2022  : 1980  [x]  Patient  Verified  Payor:  / Plan: [facility] / Product Type:  /    In time:1:19  Out time:2:15  Total Treatment Time (min): 64  Visit #: 1 of 10    Medicare/BCBS Only   Total Timed Codes (min):   1:1 Treatment Time:         Treatment Area: Right knee pain [M25.561]    SUBJECTIVE  Pain Level (0-10 scale): 3  Any medication changes, allergies to medications, adverse drug reactions, diagnosis change, or new procedure performed?: [x] No    [] Yes (see summary sheet for update)  Subjective functional status/changes:   [] No changes reported    CC: right knee pain  History/Mechanism of Injury: trip and fall on 22  Hx of laminectomy affecting left LE, ankle  Current Symptoms/Complaints: fell onto right knee at movie theater after left ankle rolled  Pooling blood/swelling immediately  Difficulty bending/straightening knee after injury  MRI demo chondromalacia, full thickness PCL tear, no evidence of meniscus damage  Pain-  Current: 0/10     Worst: 7/10   Best: 0/10  Aggravated By: putting on shoes, climbing stairs, prolonged ambulation  Alleviated By: icing  Previous Treatment/Compliance: recently received knee brace to avoid knee hyperextension  Mobility Devices: no AD at this time, intermittent cane/rollator at home  PMHx/Surgical Hx: hx of L3 laminectomy affecting left LE  Work Hx: part time   Living Situation: lives in one story home with 3 FERCHO  Household Modifications:    Hobbies: gardening  PLOF: functionally independent  Limitations to PLOF: difficulty with prolonged standing/walking  Pt Goals: strengthen knees/ankles    OBJECTIVE    30 min [x]Eval                  []Re-Eval     15 min Therapeutic Activity:  []  See flow sheet : Patient education on therapy assessment, prognosis, expectations for therapy sessions, patient goals, knee brace use, knee brace indication, knee brace fitting, and HEP. Rationale: to improve the patients ability to adhere to HEP and therapy sessions for increased compliance when working toward therapy goals. 11 min Neuromuscular Re-education:  []  See flow sheet :   Rationale: increase strength, improve coordination, improve balance, and increase proprioception  to improve the patients ability to improve standing tolerance and terminal knee extension strength. With   [] TE   [x] TA   [] neuro   [] other: Patient Education: [x] Review HEP    [] Progressed/Changed HEP based on:   [] positioning   [] body mechanics   [] transfers   [] heat/ice application    [] other:      Other Objective/Functional Measures:     Observation: left knee significant genu recurvatum in standing  Palpation: TTP at right anterior knee, especially lateral patellar border    Knee AAROM:  Right Knee: 6-107 deg  Left Knee -3-115 deg      Strength:   Right (/5) Left (/5)   Hip     Flexion 5 4+             Abduction 4 4             Adduction               Extension 4- 4-             ER 4 5             IR     Knee   Extension 5 5              Flexion 4 5   Ankle   Dorsiflexion 5 5     Girth:  Body habitus restricts girth measurement    Lumbar/LE Screen: describes frequent left thigh pain and left distal LE numbness due to laminectomy    Functional Squat: increased anterior knee translation    Stair Negotiation: ascends with left LE, descends with left LE first to decrease demand on right knee when accepting weight    Balance: SLS EO B 30\"    Special Tests:      Right Left   Hamstring 90/90 +   +   Ely + +   Eros     Anu       Pain Level (0-10 scale) post treatment: 3    ASSESSMENT/Changes in Function: See POC    Patient will continue to benefit from skilled PT services to modify and progress therapeutic interventions, address functional mobility deficits, address ROM deficits, address strength deficits, analyze and address soft tissue restrictions, analyze and cue movement patterns, analyze and modify body mechanics/ergonomics, assess and modify postural abnormalities, address imbalance/dizziness and instruct in home and community integration to attain remaining goals. [x]  See Plan of Care  []  See progress note/recertification  []  See Discharge Summary         Progress towards goals / Updated goals:  See POC    PLAN  [x]  Upgrade activities as tolerated     []  Continue plan of care  [x]  Update interventions per flow sheet       []  Discharge due to:_  []  Other:_      Ladi Paez 9/20/2022  1:20 PM    No future appointments.

## 2022-09-20 NOTE — PROGRESS NOTES
[facility] AT [address] [place] [address] [address] [place], [place] [address]  Phone: [phone] Fax: [phone] / STATEMENT OF MEDICAL NECESSITY FOR PHYSICAL THERAPY SERVICES  Patient Name: Joana Alas : 1980   Medical   Diagnosis: Right knee pain [M25.561] Treatment Diagnosis: Right knee pain   Onset Date: 22     Referral Source: Yesenia Biswas MD Start of Care [facility]): 2022   Prior Hospitalization: See medical history Provider #: [id number]   Prior Level of Function: Functionally independent, lives in single story home with 3 FERCHO   Comorbidities: Hx of L3-4 laminectomy, arthritis, depression, thyroid issues, hx of CA   Medications: Verified on Patient Summary List   The Plan of Care and following information is based on the information from the initial evaluation.     ========================================================================    Assessment / key information:  Pt is a pleasant 39 y.o. female who presents with c/o right knee pain. The patient reports an acute onset of right knee pain on 22 when she fell onto her right knee at a movie theater; further imaging of knee demonstrated right knee hemoarthrosis, full thickness PCL tear, significant degenerative changes throughout right knee. Her pain levels have decreased since the original injury but she continues to be limited by dull knee pain with prolonged standing and she feels instability in her right knee at times. Signs/symptoms at eval consistent with mechanical right knee pain. Functional deficits include: impaired knee mobility, decreased LE strength, impaired stair negotiation ability. Rehab potential is good due to desire to attain PLOF.  Pt would benefit from skilled PT to address above deficits to improve Pt's function and ability to return to PLOF with decreased pain.      ========================================================================    Eval Complexity: History: HIGH Complexity :3+ comorbidities / personal factors will impact the outcome/ POC Exam:LOW Complexity : 1-2 Standardized tests and measures addressing body structure, function, activity limitation and / or participation in recreation  Presentation: LOW Complexity : Stable, uncomplicated  Clinical Decision Making:MEDIUM Complexity : FOTO score of 26-74Overall Complexity:LOW   Problem List: pain affecting function, decrease ROM, decrease strength, edema affecting function, impaired gait/ balance, decrease ADL/ functional abilitiies, decrease activity tolerance, decrease flexibility/ joint mobility, and decrease transfer abilities   Treatment Plan may include any combination of the following: Therapeutic exercise, Therapeutic activities, Neuromuscular re-education, Physical agent/modality, Gait/balance training, Manual therapy, Aquatic therapy, Patient education, Self Care training, Functional mobility training, Home safety training, and Stair training  Patient / Family readiness to learn indicated by: asking questions    Persons(s) to be included in education: patient (P)  Barriers to Learning/Limitations: None  Measures taken:    Patient Goal (s): \"Strengthen knee/ankles\"   Patient self reported health status: good  Rehabilitation Potential: good    GOALS-  Short Term Goals: To be accomplished in 1 week  - Goal: Pt to be compliant with initial HEP to improve ability to independently manage symptoms. Status at last note/certification: Established and reviewed with Pt  Long Term Goals: To be accomplished in 10 treatments  - Goal: Pt to demo right hip ER MMT of 5/5 to improve ease with donning/doffing shoes.   Status at last note/certification: 4/5  - Goal: Patient will demonstrate -3-115 degrees AROM right knee to increase ease of ADLs  Status at last note/certification: right knee 6-107 degrees AROM  - Goal: Patient will ascend and descend 4 steps with reciprocal pattern to increase ease of entry into home  Status at last note/certification: 4 steps with left leading when ascending/descending  - Goal: Patient will tolerate ambulation time of 30 min without symptom exacerbation to facilitate improved community ambulation. Status at last note/certification: 0-89 min  - Goal: Patient will increase FOTO score to at least 64 pts to demonstrate increased functional mobility. Status at last note/certification: FOTO 49 pts       Frequency / Duration:   -Patient to be seen  2  times per week for 10  treatments:       Patient / Caregiver education and instruction: self care, activity modification, brace/ splint application, and exercises    Therapist Signature: Aggie Lovell Date: 0/35/3979   Certification Period:  Time: 7:24 PM   ========================================================================  I certify that the above Physical Therapy Services are being furnished while the patient is under my care. I agree with the treatment plan and certify that this therapy is necessary. Physician Signature:        Date:       Time: ___                                            Kenyetta Decker MD.    Please sign and return to [facility] or you may fax the signed copy to [phone]. Thank you.

## 2022-09-27 ENCOUNTER — APPOINTMENT (OUTPATIENT)
Dept: PHYSICAL THERAPY | Age: 42
End: 2022-09-27
Payer: OTHER GOVERNMENT

## 2022-09-29 ENCOUNTER — HOSPITAL ENCOUNTER (OUTPATIENT)
Dept: PHYSICAL THERAPY | Age: 42
Discharge: HOME | End: 2022-09-29
Payer: OTHER GOVERNMENT

## 2022-09-29 PROCEDURE — 97112 NEUROMUSCULAR REEDUCATION: CPT

## 2022-09-29 PROCEDURE — 97530 THERAPEUTIC ACTIVITIES: CPT

## 2022-09-29 PROCEDURE — 97110 THERAPEUTIC EXERCISES: CPT

## 2022-09-29 NOTE — PROGRESS NOTES
PT DAILY TREATMENT NOTE     Patient Name: Chantel Bravo  Date:2022  : 1980  [x]  Patient  Verified  Payor: NATANAEL / Plan: Luis [place] 74 / Product Type:  /    In time:10:51  Out time:11:42  Total Treatment Time (min): 51  Visit #: 2 of 10    Medicare/BCBS Only   Total Timed Codes (min):   1:1 Treatment Time:         Treatment Area: Right knee pain [M25.561]    SUBJECTIVE  Pain Level (0-10 scale): 0  Any medication changes, allergies to medications, adverse drug reactions, diagnosis change, or new procedure performed?: [x] No    [] Yes (see summary sheet for update)  Subjective functional status/changes:   [] No changes reported  \"I have been doing the exercises at home, they are going pretty well. My right low back has been bothering me. \"    OBJECTIVE    15 min Therapeutic Exercise:  [x] See flow sheet :   Rationale: increase ROM and increase strength to improve the patients ability to perform gait and transfers with improved LE strength and mobility. 11 min Therapeutic Activity:  [x]  See flow sheet :   Rationale: increase strength, improve coordination, improve balance, and increase proprioception  to improve the patients ability to perform transfers, stair negotiation, and floor <> waist lifts. Patient education: HEP review     25 min Neuromuscular Re-education:  [x]  See flow sheet :   -Achieving terminal knee extension strength/awareness throughout session to avoid excessive hyperextension   Rationale: increase strength, improve coordination, improve balance and increase proprioception  to improve the patients ability to perform stair negotiation and functional mobility in the home/community with improved quadriceps control and decreased falls risk.             With   [] TE   [] TA   [] neuro   [] other: Patient Education: [x] Review HEP    [] Progressed/Changed HEP based on:   [] positioning   [] body mechanics   [] transfers   [] heat/ice application    [] other: Other Objective/Functional Measures: -Initiated treatment per flowsheet     Pain Level (0-10 scale) post treatment: 3    ASSESSMENT/Changes in Function: Focus of today's treatment on slow introduction to full exercise shilo rasheed. The patient requires maximal verbal/visual cuing to improve technique throughout session to maximize positive effect of intervention. Patient is fatigued throughout the LEs at end of session. Patient will continue to benefit from skilled PT services to modify and progress therapeutic interventions, address functional mobility deficits, address ROM deficits, address strength deficits, analyze and address soft tissue restrictions, analyze and cue movement patterns, analyze and modify body mechanics/ergonomics, assess and modify postural abnormalities and address imbalance/dizziness to attain remaining goals. []  See Plan of Care  []  See progress note/recertification  []  See Discharge Summary         Progress towards goals / Updated goals:  Short Term Goals: To be accomplished in 1 week  - Goal: Pt to be compliant with initial HEP to improve ability to independently manage symptoms. Status at last note/certification: Established and reviewed with Pt  Current: met, pt reports regular compliance with HEP (9/29/22)  Long Term Goals: To be accomplished in 10 treatments  - Goal: Pt to demo right hip ER MMT of 5/5 to improve ease with donning/doffing shoes.   Status at last note/certification: 4/5  - Goal: Patient will demonstrate -3-115 degrees AROM right knee to increase ease of ADLs  Status at last note/certification: right knee 6-107 degrees AROM  - Goal: Patient will ascend and descend 4 steps with reciprocal pattern to increase ease of entry into home  Status at last note/certification: 4 steps with left leading when ascending/descending  - Goal: Patient will tolerate ambulation time of 30 min without symptom exacerbation to facilitate improved community ambulation. Status at last note/certification: 0-38 min  - Goal: Patient will increase FOTO score to at least 64 pts to demonstrate increased functional mobility.   Status at last note/certification: FOTO 49 pts     PLAN  [x]  Upgrade activities as tolerated     [x]  Continue plan of care  []  Update interventions per flow sheet       []  Discharge due to:_  []  Other:_      Padma Guzman 9/29/2022  9:09 AM    Future Appointments   Date Time Provider Lise Jaime   9/29/2022 10:45 AM Toribio Epperson [facility]   10/4/2022 10:00 AM Brenda Yung, PT [facility]   10/6/2022 10:00 AM Brenda Yung, PT [facility]   10/11/2022 10:00 AM Brenda Yung, PT [facility]   10/13/2022 10:00 AM Brenda Yung, PT [facility]

## 2022-10-04 ENCOUNTER — HOSPITAL ENCOUNTER (OUTPATIENT)
Dept: PHYSICAL THERAPY | Age: 42
Discharge: HOME | End: 2022-10-04
Payer: OTHER GOVERNMENT

## 2022-10-04 PROCEDURE — 97112 NEUROMUSCULAR REEDUCATION: CPT

## 2022-10-04 PROCEDURE — 97016 VASOPNEUMATIC DEVICE THERAPY: CPT

## 2022-10-04 PROCEDURE — 97530 THERAPEUTIC ACTIVITIES: CPT

## 2022-10-04 PROCEDURE — 97110 THERAPEUTIC EXERCISES: CPT

## 2022-10-04 NOTE — PROGRESS NOTES
PT DAILY TREATMENT NOTE     Patient Name: Celeste Arevalo  Date:10/4/2022  : 1980  [x]  Patient  Verified  Payor:  / Plan: Luis [place] 74 / Product Type:  /    In time: 1000 Out time: 3775  Total Treatment Time (min): 65  Visit #: 3 of 10      Treatment Area: Right knee pain [M25.561]    SUBJECTIVE  Pain Level (0-10 scale): 0 \"a little sore, overusing it\"  Any medication changes, allergies to medications, adverse drug reactions, diagnosis change, or new procedure performed?: [x] No    [] Yes (see summary sheet for update)  Subjective functional status/changes:   [] No changes reported  \"Over the weekend, we visited friends that live on a 3rd floor walk up, that made it angry. \"    OBJECTIVE    Modality rationale: decrease pain and increase tissue extensibility to improve the patient's ability to perform ADLs and rest comfortably following therapy   Min Type Additional Details    [] Estim:  []Unatt       []IFC  []Premod                        []Other:  []w/ice   []w/heat  Position:  Location:    [] Estim: []Att    []TENS instruct  []NMES                    []Other:  []w/US   []w/ice   []w/heat  Position:  Location:    []  Traction: [] Cervical       []Lumbar                       [] Prone          []Supine                       []Intermittent   []Continuous Lbs:  [] before manual  [] after manual    []  Ultrasound: []Continuous   [] Pulsed                           []1MHz   []3MHz Location:  W/cm2:    []  Iontophoresis with dexamethasone         Location: [] Take home patch   [] In clinic    []  Ice     []  heat  []  Ice massage  []  Laser   []  Anodyne Position:  Location:    []  Laser with stim  []  Other: Position:  Location:   10 +2 set up [x]  Vasopneumatic Device  Pre: 48cm  Post: 47cm  Measured at mid joint line Pressure:       [] lo [x] med [] hi   Temperature: [x] lo [] med [] hi   [x] Skin assessment post-treatment:  [x]intact []redness- no adverse reaction []redness - adverse reaction:      15 min Therapeutic Exercise:  [x] See flow sheet :  -added HR, cues to reduce height to avoid inversion  -added HS curl with RTB to 70 deg   Rationale: increase ROM and increase strength to improve the patients ability to perform gait and transfers with improved LE strength and mobility. 13 min Therapeutic Activity:  [x]  See flow sheet :  -added mini squats with BUE support and hip hinge   Rationale: increase strength, improve coordination, improve balance, and increase proprioception  to improve the patients ability to perform transfers, stair negotiation, and floor <> waist lifts. 25 min Neuromuscular Re-education:  [x]  See flow sheet :   -ROM on foam, EC without UE A  -Sharpened ROM on foam, EC with 1 UE A   Rationale: increase strength, improve coordination, improve balance and increase proprioception  to improve the patients ability to perform stair negotiation and functional mobility in the home/community with improved quadriceps control and decreased falls risk. [facility] Patient Education:     Refrain from allowing ankles to invert     Other Objective/Functional Measures:     Pain Level (0-10 scale) post treatment: 3    ASSESSMENT/Changes in Function: Pt demo's slow, intentional movement in all interventions. Notes cont feeling of laxity into end range of knee ext and fatigue with final reps of TKE. HS strengthening introduced today, visually maintaining range 0-70deg to prevent excess post tibial translation. Lateral BOSU/floor lunges not well tolerated and held at this time, pt knee observed to have instability and c/o weird feeling, however no pain. Session concluded with vaso to address incr swelling following incr stair use over weekend, pt notes no change in s/sx however girth measurement reduced by 1cm.     Patient will continue to benefit from skilled PT services to modify and progress therapeutic interventions, address functional mobility deficits, address ROM deficits, address strength deficits, analyze and address soft tissue restrictions, analyze and cue movement patterns, analyze and modify body mechanics/ergonomics, assess and modify postural abnormalities and address imbalance/dizziness to attain remaining goals. []  See Plan of Care  []  See progress note/recertification  []  See Discharge Summary         Progress towards goals / Updated goals:  Short Term Goals: To be accomplished in 1 week  - Goal: Pt to be compliant with initial HEP to improve ability to independently manage symptoms. Status at last note/certification: Established and reviewed with Pt  Current: met, pt reports regular compliance with HEP (9/29/22)  Long Term Goals: To be accomplished in 10 treatments  - Goal: Pt to demo right hip ER MMT of 5/5 to improve ease with donning/doffing shoes. Status at last note/certification: 4/5  - Goal: Patient will demonstrate -3-115 degrees AROM right knee to increase ease of ADLs  Status at last note/certification: right knee 6-107 degrees AROM  - Goal: Patient will ascend and descend 4 steps with reciprocal pattern to increase ease of entry into home  Status at last note/certification: 4 steps with left leading when ascending/descending  Current 10/4/22 progressing with mini squats  - Goal: Patient will tolerate ambulation time of 30 min without symptom exacerbation to facilitate improved community ambulation. Status at last note/certification: 8-51 min  - Goal: Patient will increase FOTO score to at least 64 pts to demonstrate increased functional mobility.   Status at last note/certification: FOTO 49 pts     PLAN  [x]  Upgrade activities as tolerated     [x]  Continue plan of care  []  Update interventions per flow sheet       []  Discharge due to:_  []  Other:_      Vidya Moeller PT 10/4/2022  9:09 AM    Future Appointments   Date Time Provider Lise Jaime   10/4/2022 10:00 AM Wilbur Karimi PT [facility]   10/6/2022 10:00 AM Ron Win, [facility]   10/11/2022 10:00 AM [facility] Cardoso [facility]   10/13/2022 10:00 AM [facility] Cardoso [facility]

## 2022-10-06 ENCOUNTER — HOSPITAL ENCOUNTER (OUTPATIENT)
Dept: PHYSICAL THERAPY | Age: 42
Discharge: HOME | End: 2022-10-06
Payer: OTHER GOVERNMENT

## 2022-10-06 PROCEDURE — 97110 THERAPEUTIC EXERCISES: CPT

## 2022-10-06 PROCEDURE — 97530 THERAPEUTIC ACTIVITIES: CPT

## 2022-10-06 PROCEDURE — 97112 NEUROMUSCULAR REEDUCATION: CPT

## 2022-10-06 NOTE — PROGRESS NOTES
PT DAILY TREATMENT NOTE     Patient Name: Molly Souza  Date:10/6/2022  : 1980  [x]  Patient  Verified  Payor:  / Plan: Luis [place] 74 / Product Type:  /    In time: 1003 Out time: 1056  Total Treatment Time (min): 48  Visit #: 4 of 10      Treatment Area: Right knee pain [M25.561]    SUBJECTIVE  Pain Level (0-10 scale): 1-2/10  Any medication changes, allergies to medications, adverse drug reactions, diagnosis change, or new procedure performed?: [x] No    [] Yes (see summary sheet for update)  Subjective functional status/changes:   [] No changes reported  Tuesday night \"felt like the bones were grinding, getting achy a lot faster than usual\".      OBJECTIVE    Modality rationale: decrease pain and increase tissue extensibility to improve the patient's ability to perform ADLs and rest comfortably following therapy   Min Type Additional Details    [] Estim:  []Unatt       []IFC  []Premod                        []Other:  []w/ice   []w/heat  Position:  Location:    [] Estim: []Att    []TENS instruct  []NMES                    []Other:  []w/US   []w/ice   []w/heat  Position:  Location:    []  Traction: [] Cervical       []Lumbar                       [] Prone          []Supine                       []Intermittent   []Continuous Lbs:  [] before manual  [] after manual    []  Ultrasound: []Continuous   [] Pulsed                           []1MHz   []3MHz Location:  W/cm2:    []  Iontophoresis with dexamethasone         Location: [] Take home patch   [] In clinic    []  Ice     []  heat  []  Ice massage  []  Laser   []  Anodyne Position:  Location:    []  Laser with stim  []  Other: Position:  Location:   10 +2 set up [x]  Vasopneumatic Device  Pre: 47.25cm  Post: 46cm  Measured at mid joint line Pressure:       [] lo [x] med [] hi   Temperature: [x] lo [] med [] hi   [x] Skin assessment post-treatment:  [x]intact []redness- no adverse reaction    []redness - adverse reaction:      8 min Therapeutic Exercise:  [x] See flow sheet :     Rationale: increase ROM and increase strength to improve the patients ability to perform gait and transfers with improved LE strength and mobility. 8 min Therapeutic Activity:  [x]  See flow sheet :  -added supine bridge with glute set, pushing through heels  -added clams I and III with GTB   Rationale: increase strength, improve coordination, improve balance, and increase proprioception  to improve the patients ability to perform transfers, stair negotiation, and floor <> waist lifts. 25 min Neuromuscular Re-education:  [x]  See flow sheet :   -adjusted TKE to semi-reclined to reduce load on knee  -adjusted HS curl with SB in semi-reclined, with DF  -ROM on foam, EC without UE A, cues for abd bracing to offset back pain  -Sharpened ROM on foam, EC with 1 UE A   Rationale: increase strength, improve coordination, improve balance and increase proprioception  to improve the patients ability to perform stair negotiation and functional mobility in the home/community with improved quadriceps control and decreased falls risk. [facility] Patient Education:     Sensation of clunking normal given dx of PCL   Wearing shoes with improved stability and support  Use of recumbent bike at home for exercise   Difference between discomfort and pain as related to [facility] and exercise     Other Objective/Functional Measures:     Pain Level (0-10 scale) post treatment: 1/10     ASSESSMENT/Changes in Function:  Program re-directed to bed level interventions for LE strengthening and stabilizing without incr load thru knees. Throughout session pt notes varying sensations of clunking and tightness, given continuous modifications to address, including DF and glute with bridges, abd bracing with standing dynamic balance. Pt notes no pain or soreness following interventions but muscular \"tiredness\".  Session concluded with vaso per pt preference. Patient will continue to benefit from skilled PT services to modify and progress therapeutic interventions, address functional mobility deficits, address ROM deficits, address strength deficits, analyze and address soft tissue restrictions, analyze and cue movement patterns, analyze and modify body mechanics/ergonomics, assess and modify postural abnormalities and address imbalance/dizziness to attain remaining goals. []  See Plan of Care  []  See progress note/recertification  []  See Discharge Summary         Progress towards goals / Updated goals:  Short Term Goals: To be accomplished in 1 week  - Goal: Pt to be compliant with initial HEP to improve ability to independently manage symptoms. Status at last note/certification: Established and reviewed with Pt  Current: met, pt reports regular compliance with HEP (9/29/22)  Long Term Goals: To be accomplished in 10 treatments  - Goal: Pt to demo right hip ER MMT of 5/5 to improve ease with donning/doffing shoes. Status at last note/certification: 4/5  Current: 10/6/22 addressing with resisted clams  - Goal: Patient will demonstrate -3-115 degrees AROM right knee to increase ease of ADLs  Status at last note/certification: right knee 6-107 degrees AROM  - Goal: Patient will ascend and descend 4 steps with reciprocal pattern to increase ease of entry into home  Status at last note/certification: 4 steps with left leading when ascending/descending  Current 10/4/22 progressing with mini squats  - Goal: Patient will tolerate ambulation time of 30 min without symptom exacerbation to facilitate improved community ambulation. Status at last note/certification: 9-34 min  - Goal: Patient will increase FOTO score to at least 64 pts to demonstrate increased functional mobility.   Status at last note/certification: FOTO 49 pts     PLAN  [x]  Upgrade activities as tolerated     [x]  Continue plan of care  []  Update interventions per flow sheet       [] Discharge due to:_  []  Other:_      Titi Castle, PT 10/6/2022  9:09 AM    Future Appointments   Date Time Provider Lise Jiame   10/6/2022 10:00 AM Karlie Delgadillo, PT [facility]   10/11/2022 10:00 AM Karlie Delgadillo, PT [facility]   10/13/2022 10:00 AM Karlie Delgadillo, PT [facility]

## 2022-10-11 ENCOUNTER — HOSPITAL ENCOUNTER (OUTPATIENT)
Dept: PHYSICAL THERAPY | Age: 42
Discharge: HOME | End: 2022-10-11
Payer: OTHER GOVERNMENT

## 2022-10-11 PROCEDURE — 97530 THERAPEUTIC ACTIVITIES: CPT

## 2022-10-11 PROCEDURE — 97112 NEUROMUSCULAR REEDUCATION: CPT

## 2022-10-11 PROCEDURE — 97016 VASOPNEUMATIC DEVICE THERAPY: CPT

## 2022-10-11 PROCEDURE — 97110 THERAPEUTIC EXERCISES: CPT

## 2022-10-11 NOTE — PROGRESS NOTES
PT DAILY TREATMENT NOTE     Patient Name: Simon Ghosh  Date:10/11/2022  : 1980  [x]  Patient  Verified  Payor:  / Plan: Luis [place] 74 / Product Type:  /    In time: 1003 Out time: 1056  Total Treatment Time (min): 53  Visit #: 5 of 10      Treatment Area: Right knee pain [M25.561]    SUBJECTIVE  Pain Level (0-10 scale): 0/10  Any medication changes, allergies to medications, adverse drug reactions, diagnosis change, or new procedure performed?: [x] No    [] Yes (see summary sheet for update)  Subjective functional status/changes:   [] No changes reported  Pt reports aching pain at proximal medial patella beginning yesterday afternoon of insidious onset, was mostly inactive all weekend, feels better this morning.      Improvements: I feel less nervious hyperextending my knee or moving it wrong, stair negotiation, decr pain, not popping with extension  Deficits: L ankle quick to fatigue like it's going to roll    OBJECTIVE    Modality rationale: decrease pain and increase tissue extensibility to improve the patient's ability to perform ADLs and rest comfortably following therapy   Min Type Additional Details    [] Estim:  []Unatt       []IFC  []Premod                        []Other:  []w/ice   []w/heat  Position:  Location:    [] Estim: []Att    []TENS instruct  []NMES                    []Other:  []w/US   []w/ice   []w/heat  Position:  Location:    []  Traction: [] Cervical       []Lumbar                       [] Prone          []Supine                       []Intermittent   []Continuous Lbs:  [] before manual  [] after manual    []  Ultrasound: []Continuous   [] Pulsed                           []1MHz   []3MHz Location:  W/cm2:    []  Iontophoresis with dexamethasone         Location: [] Take home patch   [] In clinic    []  Ice     []  heat  []  Ice massage  []  Laser   []  Anodyne Position:  Location:    []  Laser with stim  []  Other: Position:  Location:   10 +2 set up [x]  Vasopneumatic Device  Pre: 47.25cm  Post: NT d/t change in clinician  Measured at mid joint line Pressure:       [] lo [x] med [] hi   Temperature: [x] lo [] med [] hi   [x] Skin assessment post-treatment:  [x]intact []redness- no adverse reaction    []redness - adverse reaction:      8 min Therapeutic Exercise:  [x] See flow sheet :     Rationale: increase ROM and increase strength to improve the patients ability to perform gait and transfers with improved LE strength and mobility. 8 min Therapeutic Activity:  [x]  See flow sheet :     Rationale: increase strength, improve coordination, improve balance, and increase proprioception  to improve the patients ability to perform transfers, stair negotiation, and floor <> waist lifts. 25 min Neuromuscular Re-education:  [x]  See flow sheet :   -adjusted HS curl with SB in semi-reclined, with DF  -ROM on foam, EC without UE A, cues for abd bracing to offset back pain  -Tandem on foam, EC with 1 UE A  -Tandem on foam, EO with no UE A  -added Step up to foam with high knee drive  -added Wobble board PF/DF and Inv/Ev holding flat   Rationale: increase strength, improve coordination, improve balance and increase proprioception  to improve the patients ability to perform stair negotiation and functional mobility in the home/community with improved quadriceps control and decreased falls risk. [facility] Patient Education:     Wearing shoes with improved stability and support  Roll out calves and foot to reduce DOMs following therapy     Other Objective/Functional Measures:       Pain Level (0-10 scale) post treatment: 0/10     ASSESSMENT/Changes in Function:  Pt notes great improvement with decr pain in R knee, ease with movement and stair negotiation, as well as decr feelings of ankle rolling.  Today's session focused on NM re-ed for decr falls risk and improving knee/ankle stability, pt notes no incr pain and demonstrates good progression with EC variations. Session concluded with vaso per pt preference. Patient will continue to benefit from skilled PT services to modify and progress therapeutic interventions, address functional mobility deficits, address ROM deficits, address strength deficits, analyze and address soft tissue restrictions, analyze and cue movement patterns, analyze and modify body mechanics/ergonomics, assess and modify postural abnormalities and address imbalance/dizziness to attain remaining goals. []  See Plan of Care  []  See progress note/recertification  []  See Discharge Summary         Progress towards goals / Updated goals:  Short Term Goals: To be accomplished in 1 week  - Goal: Pt to be compliant with initial HEP to improve ability to independently manage symptoms. Status at last note/certification: Established and reviewed with Pt  Current: met, pt reports regular compliance with HEP (9/29/22)  Long Term Goals: To be accomplished in 10 treatments  - Goal: Pt to demo right hip ER MMT of 5/5 to improve ease with donning/doffing shoes. Status at last note/certification: 4/5  Current: 10/6/22 addressing with resisted clams  - Goal: Patient will demonstrate -3-115 degrees AROM right knee to increase ease of ADLs  Status at last note/certification: right knee 6-107 degrees AROM  - Goal: Patient will ascend and descend 4 steps with reciprocal pattern to increase ease of entry into home  Status at last note/certification: 4 steps with left leading when ascending/descending  Current 10/4/22 progressing with mini squats  - Goal: Patient will tolerate ambulation time of 30 min without symptom exacerbation to facilitate improved community ambulation. Status at last note/certification: 3-81 min  - Goal: Patient will increase FOTO score to at least 64 pts to demonstrate increased functional mobility.   Status at last note/certification: FOTO 49 pts     PLAN  [x]  Upgrade activities as tolerated     [x]  Continue plan of care  [] Update interventions per flow sheet       []  Discharge due to:_  [x]  Other:_  PN GRETCHEN Newman, PT 10/11/2022  9:09 AM    Future Appointments   Date Time Provider Lise Jaime   10/11/2022 10:00 AM [facility] Egan [facility]   10/13/2022 10:00 AM [facility] Egan [facility]

## 2022-10-13 ENCOUNTER — APPOINTMENT (OUTPATIENT)
Dept: PHYSICAL THERAPY | Age: 42
End: 2022-10-13
Payer: OTHER GOVERNMENT

## 2022-10-18 ENCOUNTER — HOSPITAL ENCOUNTER (OUTPATIENT)
Dept: PHYSICAL THERAPY | Age: 42
Discharge: HOME | End: 2022-10-18
Payer: OTHER GOVERNMENT

## 2022-10-18 PROCEDURE — 97110 THERAPEUTIC EXERCISES: CPT

## 2022-10-18 PROCEDURE — 97112 NEUROMUSCULAR REEDUCATION: CPT

## 2022-10-18 PROCEDURE — 97016 VASOPNEUMATIC DEVICE THERAPY: CPT

## 2022-10-18 PROCEDURE — 97530 THERAPEUTIC ACTIVITIES: CPT

## 2022-10-18 NOTE — PROGRESS NOTES
[facility] AT [address] Cyndy Benson 57  Phone: [phone] Fax: [phone]  PROGRESS NOTE  Patient Name: Lorelei Bauman : 1980   Treatment/Medical Diagnosis: Right knee pain [M25.561]   Referral Source: [facility]     Date of Initial Visit: 22 Attended Visits: 6 Missed Visits: 1     SUMMARY OF TREATMENT   Pt is a pleasant 39 y.o. female who presents with c/o right knee pain. The patient reports an acute onset of right knee pain on 22 when she fell onto her right knee at a movie theater; further imaging of knee demonstrated right knee hemoarthrosis, full thickness PCL tear, significant degenerative changes throughout right knee. Treatment has consisted of the following: Therapeutic exercise, Therapeutic activities, Neuromuscular re-education, Physical agent/modality, Gait/balance training, Manual therapy, Patient education, Functional mobility training, and Self Care training. CURRENT STATUS  Pt has attended 6 treatments since initial evaluation on 22 where demonstrates good progress, meeting goals for prolonged ambulation tolerance and AROM. Pt cont to have mild pain, c/o ankle and knee instability, and decr dynamic standing balance.  Further assessment as follows:    Subjective \"85%\" Improvement  Pain: Best: 0/10 Worst: 3-4/10 Av/10  Improvements: I feel less nervious hyperextending my knee or moving it wrong, stair negotiation, decr pain, not popping with extension, \"sturdier walking\"  Deficits: L ankle quick to fatigue like it's going to roll, achy at end of day  Pt goals: strengthening and stability    Other Objective/Functional Measures:   Observation: left knee significant genu recurvatum in standing  Palpation: denies TTP at R knee, quad, patella  Stairs: Reciprocal, no UE A, hesitation with ecc lowering of R knee onto L LE    Knee AROM:  Right Knee: -7 - 115 deg  Left Knee -5 - 115 deg     Strength:    Right (/5) Left (/5)   Hip     Flexion 5 5             Abduction 4+ 4+             Extension 4 4             ER 5 5   Knee   Extension 5 5              Flexion 5 5   Ankle   Dorsiflexion 5 5      Functional Squat: increased anterior knee translation     Balance:   SLS EC on floor: R: 9 sec, L: 6 sec incr med-lat sway and hip strategy  ROM on foam, EC: >30 sec      Special Tests:     Right Left   Hamstring 90/90 +   +   Ely + +            Progress towards goals / Updated goals:  Short Term Goals: To be accomplished in 1 week  - Goal: Pt to be compliant with initial HEP to improve ability to independently manage symptoms. Status at last note/certification: Established and reviewed with Pt  Current: met, pt reports regular compliance with HEP (9/29/22)  Long Term Goals: To be accomplished in 10 treatments  - Goal: Pt to demo right hip ER MMT of 5/5 to improve ease with donning/doffing shoes. Status at last note/certification: 4/5  Current 10/18/22 goal met 5/5  - Goal: Patient will demonstrate -3-115 degrees AROM right knee to increase ease of ADLs  Status at last note/certification: right knee 6-107 degrees AROM  Current 10/18/22 goal met, -7 - 115 deg  - Goal: Patient will ascend and descend 4 steps with reciprocal pattern to increase ease of entry into home  Status at last note/certification: 4 steps with left leading when ascending/descending  Current 10/18/22 goal near met, reciprocal, no UE A, hesitation with ecc lowering of R knee onto L LE  - Goal: Patient will tolerate ambulation time of 30 min without symptom exacerbation to facilitate improved community ambulation. Status at last note/certification: 0-27 min  Current 10/18/22 goal met, pt reports ability to tolerate >60 mins (3-4 miles) of walking  - Goal: Patient will increase FOTO score to at least 64 pts to demonstrate increased functional mobility.   Status at last note/certification: FOTO 49 pts   Current 10/18/22 goal near met, 61      New Goals to be achieved in __2-4__  treatments:  - Goal: Patient will ascend and descend 4 steps x4 with reciprocal pattern and ease with ecc lowering to increase ease of entry into home  Status at last note/certification: reciprocal, no UE A, hesitation with ecc lowering of R knee onto L LE  - Goal: Patient will increase FOTO score to at least 64 pts to demonstrate increased functional mobility. Status at last note/certification:  63  -Goal: Pt will demo SLS on floor with EC of at least 15 seconds B for improved knee and ankle stability and decr falls risk. SLS EC on floor: R: 9 sec, L: 6 sec incr med-lat sway and hip strategy    RECOMMENDATIONS  Recommend cont skilled PT for 2-4 remaining scheduled visits to address remaining deficits, achieve stated goals, and progress to PLOF. If you have any questions/comments please contact us directly at [phone]   Thank you for allowing us to assist in the care of your patient. Therapist Signature: Ludivina Valencia PT Date: 10/18/2022   Reporting Period  9/20/22/ - 10/18/22 Time: 9:07 AM   NOTE TO PHYSICIAN:  PLEASE COMPLETE THE ORDERS BELOW AND FAX TO   [facility]: [phone]. If you are unable to process this request in 24 hours please contact our office: [phone].  ___ I have read the above report and request that my patient continue as recommended.   ___ I have read the above report and request that my patient continue therapy with the following changes/special instructions:_________________________________________________________   ___ I have read the above report and request that my patient be discharged from therapy. Physician Signature:        Date:       Time:                                                        GILBERTO Campos.

## 2022-10-18 NOTE — PROGRESS NOTES
PT DAILY TREATMENT NOTE     Patient Name: Chantel Bravo  Date:10/18/2022  : 1980  [x]  Patient  Verified  Payor:  / Plan: Luis Birmingham 74 / Product Type:  /    In time:    Out time: 173  Total Treatment Time (min): 58  Visit #: 6 of 10      Treatment Area: Right knee pain [M25.561]    SUBJECTIVE  Pain Level (0-10 scale): 0/10  Any medication changes, allergies to medications, adverse drug reactions, diagnosis change, or new procedure performed?: [x] No    [] Yes (see summary sheet for update)  Subjective functional status/changes:   [] No changes reported  \"Everything but my knee hurts, I slept wrong. \"      OBJECTIVE    Modality rationale: decrease pain and increase tissue extensibility to improve the patient's ability to perform ADLs and rest comfortably following therapy   Min Type Additional Details    [] Estim:  []Unatt       []IFC  []Premod                        []Other:  []w/ice   []w/heat  Position:  Location:    [] Estim: []Att    []TENS instruct  []NMES                    []Other:  []w/US   []w/ice   []w/heat  Position:  Location:    []  Traction: [] Cervical       []Lumbar                       [] Prone          []Supine                       []Intermittent   []Continuous Lbs:  [] before manual  [] after manual    []  Ultrasound: []Continuous   [] Pulsed                           []1MHz   []3MHz Location:  W/cm2:    []  Iontophoresis with dexamethasone         Location: [] Take home patch   [] In clinic    []  Ice     []  heat  []  Ice massage  []  Laser   []  Anodyne Position:  Location:    []  Laser with stim  []  Other: Position:  Location:   10 +2 set up [x]  Vasopneumatic Device  Pre: NT  Post: NT d/t change in clinician  Measured at mid joint line Pressure:       [] lo [x] med [] hi   Temperature: [x] lo [] med [] hi   [x] Skin assessment post-treatment:  [x]intact []redness- no adverse reaction    []redness - adverse reaction:      12 min Therapeutic Exercise:  [x] See flow sheet :  Reassessment  Added Leg press eccentric (2 up, 1 down), 100# d/l, pt notes popping at R patella with lowering   Rationale: increase ROM and increase strength to improve the patients ability to perform gait and transfers with improved LE strength and mobility. 10 min Therapeutic Activity:  [x]  See flow sheet :     Rationale: increase strength, improve coordination, improve balance, and increase proprioception  to improve the patients ability to perform transfers, stair negotiation, and floor <> waist lifts. 24 min Neuromuscular Re-education:  [x]  See flow sheet :   -ROM on foam, EC without UE A, cues for mild knee flexion  -SLS on floor, EC  -Step up to foam with high knee drive  -Wobble board PF/DF and Inv/Ev holding flat   Rationale: increase strength, improve coordination, improve balance and increase proprioception  to improve the patients ability to perform stair negotiation and functional mobility in the home/community with improved quadriceps control and decreased falls risk.           Through out min Patient Education: [x] Review HEP   Reassessment findings  Pt progress made toward goals  Pt goals for cont PT, intent of therapy    Refrain from standing with knees locked out, maintain gentle knee flexion for improve quad control and decr strain on ligaments         Subjective \"85%\" Improvement  Pain: Best: 0/10 Worst: 3-410 Av/10  Improvements: I feel less nervious hyperextending my knee or moving it wrong, stair negotiation, decr pain, not popping with extension, \"sturdier walking\"  Deficits: L ankle quick to fatigue like it's going to roll, achy at end of day  Pt goals: strengthening and stability    Other Objective/Functional Measures:   Observation: left knee significant genu recurvatum in standing  Palpation: denies TTP at R knee, quad, patella  Stairs: Reciprocal, no UE A, hesitation with ecc lowering of R knee onto L LE    Knee AROM:  Right Knee: -7 - 115 deg  Left Knee -5 - 115 deg     Strength:    Right (/5) Left (/5)   Hip     Flexion 5 5             Abduction 4+ 4+             Extension 4 4             ER 5 5   Knee   Extension 5 5              Flexion 5 5   Ankle   Dorsiflexion 5 5      Functional Squat: increased anterior knee translation     Balance:   SLS EC on floor: R: 9 sec, L: 6 sec incr med-lat sway and hip strategy  ROM on foam, EC: >30 sec      Special Tests:     Right Left   Hamstring 90/90 +   +   Ely + +        Pain Level (0-10 scale) post treatment: 0/10     ASSESSMENT/Changes in Function:  Please see PN     Patient will continue to benefit from skilled PT services to modify and progress therapeutic interventions, address functional mobility deficits, address ROM deficits, address strength deficits, analyze and address soft tissue restrictions, analyze and cue movement patterns, analyze and modify body mechanics/ergonomics, assess and modify postural abnormalities and address imbalance/dizziness to attain remaining goals. []  See Plan of Care  [x]  See progress note/recertification  []  See Discharge Summary         Progress towards goals / Updated goals:  Short Term Goals: To be accomplished in 1 week  - Goal: Pt to be compliant with initial HEP to improve ability to independently manage symptoms. Status at last note/certification: Established and reviewed with Pt  Current: met, pt reports regular compliance with HEP (9/29/22)  Long Term Goals: To be accomplished in 10 treatments  - Goal: Pt to demo right hip ER MMT of 5/5 to improve ease with donning/doffing shoes.   Status at last note/certification: 4/5  Current 10/18/22 goal met 5/5  - Goal: Patient will demonstrate -3-115 degrees AROM right knee to increase ease of ADLs  Status at last note/certification: right knee 6-107 degrees AROM  Current 10/18/22 goal met, -7 - 115 deg  - Goal: Patient will ascend and descend 4 steps with reciprocal pattern to increase ease of entry into home  Status at last note/certification: 4 steps with left leading when ascending/descending  Current 10/18/22 goal near met, reciprocal, no UE A, hesitation with ecc lowering of R knee onto L LE  - Goal: Patient will tolerate ambulation time of 30 min without symptom exacerbation to facilitate improved community ambulation. Status at last note/certification: 2-04 min  Current 10/18/22 goal met, pt reports ability to tolerate >60 mins (3-4 miles) of walking  - Goal: Patient will increase FOTO score to at least 64 pts to demonstrate increased functional mobility.   Status at last note/certification: FOTO 49 pts   Current 10/18/22 goal near met, 61    PLAN  [x]  Upgrade activities as tolerated     [x]  Continue plan of care  []  Update interventions per flow sheet       []  Discharge due to:_  [x]  Other:_  Plan to cont with 2 remaining visits    Serafin Roberts PT 10/18/2022  9:09 AM    Future Appointments   Date Time Provider Lise Jaime   10/18/2022  2:00 PM Martha Davies PT [facility]   10/20/2022 10:00 AM AMADEO Muir [facility]   10/25/2022  1:15 PM Martha Davies PT [facility]

## 2022-10-20 ENCOUNTER — HOSPITAL ENCOUNTER (OUTPATIENT)
Dept: PHYSICAL THERAPY | Age: 42
Discharge: HOME | End: 2022-10-20
Payer: OTHER GOVERNMENT

## 2022-10-20 PROCEDURE — 97110 THERAPEUTIC EXERCISES: CPT

## 2022-10-20 PROCEDURE — 97112 NEUROMUSCULAR REEDUCATION: CPT

## 2022-10-20 PROCEDURE — 97530 THERAPEUTIC ACTIVITIES: CPT

## 2022-10-20 PROCEDURE — 97016 VASOPNEUMATIC DEVICE THERAPY: CPT

## 2022-10-20 NOTE — PROGRESS NOTES
PT DAILY TREATMENT NOTE     Patient Name: Severo Michelle  AQOW:  : 1980  [x]  Patient  Verified  Payor:  / Plan: Luis Birmingham 74 / Product Type:  /    In time:  1003  Out time: 1059  Total Treatment Time (min): 64  Visit #: 1 of 2-4      Treatment Area: Right knee pain [M25.561]    SUBJECTIVE  Pain Level (0-10 scale): 1-210  Any medication changes, allergies to medications, adverse drug reactions, diagnosis change, or new procedure performed?: [x] No    [] Yes (see summary sheet for update)  Subjective functional status/changes:   [] No changes reported  \"I think I might have over done it with the leg presses on Tuesday, the muscles feel good, just joint itself is angry. \" Pt reports icing after session.      OBJECTIVE    Modality rationale: decrease pain and increase tissue extensibility to improve the patient's ability to perform ADLs and rest comfortably following therapy   Min Type Additional Details    [] Estim:  []Unatt       []IFC  []Premod                        []Other:  []w/ice   []w/heat  Position:  Location:    [] Estim: []Att    []TENS instruct  []NMES                    []Other:  []w/US   []w/ice   []w/heat  Position:  Location:    []  Traction: [] Cervical       []Lumbar                       [] Prone          []Supine                       []Intermittent   []Continuous Lbs:  [] before manual  [] after manual    []  Ultrasound: []Continuous   [] Pulsed                           []1MHz   []3MHz Location:  W/cm2:    []  Iontophoresis with dexamethasone         Location: [] Take home patch   [] In clinic    []  Ice     []  heat  []  Ice massage  []  Laser   []  Anodyne Position:  Location:    []  Laser with stim  []  Other: Position:  Location:   10 +2 set up [x]  Vasopneumatic Device  Pre: 49cm  Post: 49cm  Measured at mid joint line Pressure:       [] lo [x] med [] hi   Temperature: [x] lo [] med [] hi   [x] Skin assessment post-treatment:  [x]intact []redness- no adverse reaction    []redness - adverse reaction:      15 min Therapeutic Exercise:  [x] See flow sheet :  HR on foam   Rationale: increase ROM and increase strength to improve the patients ability to perform gait and transfers with improved LE strength and mobility. 15 min Therapeutic Activity:  [x]  See flow sheet :  Added wall squat (pain in L knee)  Added high lunge with front foot on foam   Rationale: increase strength, improve coordination, improve balance, and increase proprioception  to improve the patients ability to perform transfers, stair negotiation, and floor <> waist lifts. 14 min Neuromuscular Re-education:  [x]  See flow sheet :   Added:  Standing ISO clams against wall (pain in R hamstring when knee flexed)  -SLS on floor, EC  -Step up to foam with high knee drive, forward and lateral   Rationale: increase strength, improve coordination, improve balance and increase proprioception  to improve the patients ability to perform stair negotiation and functional mobility in the home/community with improved quadriceps control and decreased falls risk. Through out min Patient Education: [x] Review HEP     Wear compression sleeve and take rest breaks during walking tour this weekend         Other Objective/Functional Measures:         Pain Level (0-10 scale) post treatment: 0/10     ASSESSMENT/Changes in Function:  Patient chatty throughout session requires freq re-direction. Incr in WB quad and glute strengthening at low ranges well tolerated with good mechanics. Mild c/o of knee pain with standing clam isos with R LE lifted, likely due to HS weakness. Otherwise pt notes only feeling \"worked but not pain\" toward end of session.      Patient will continue to benefit from skilled PT services to modify and progress therapeutic interventions, address functional mobility deficits, address ROM deficits, address strength deficits, analyze and address soft tissue restrictions, analyze and cue movement patterns, analyze and modify body mechanics/ergonomics, assess and modify postural abnormalities and address imbalance/dizziness to attain remaining goals. []  See Plan of Care  []  See progress note/recertification  []  See Discharge Summary         Progress towards goals / Updated goals:  - Goal: Patient will ascend and descend 4 steps x4 with reciprocal pattern and ease with ecc lowering to increase ease of entry into home  Status at last note/certification: reciprocal, no UE A, hesitation with ecc lowering of R knee onto L LE  - Goal: Patient will increase FOTO score to at least 64 pts to demonstrate increased functional mobility. Status at last note/certification:  63  -Goal: Pt will demo SLS on floor with EC of at least 15 seconds B for improved knee and ankle stability and decr falls risk.    SLS EC on floor: R: 9 sec, L: 6 sec incr med-lat sway and hip strategy      PLAN  [x]  Upgrade activities as tolerated     [x]  Continue plan of care  []  Update interventions per flow sheet       []  Discharge due to:_  [x]  Other:_  Plan to cont with 2 remaining visits    Vidya Moeller PT 10/20/2022  9:09 AM    Future Appointments   Date Time Provider Lise Jaime   10/20/2022 10:00 AM Wilbur Karimi PT [facility]   10/25/2022  1:15 PM Wilbur Karimi PT [facility]

## 2022-10-25 ENCOUNTER — HOSPITAL ENCOUNTER (OUTPATIENT)
Dept: PHYSICAL THERAPY | Age: 42
Discharge: HOME | End: 2022-10-25
Payer: OTHER GOVERNMENT

## 2022-10-25 PROCEDURE — 97112 NEUROMUSCULAR REEDUCATION: CPT

## 2022-10-25 PROCEDURE — 97530 THERAPEUTIC ACTIVITIES: CPT

## 2022-10-25 PROCEDURE — 97110 THERAPEUTIC EXERCISES: CPT

## 2022-10-25 NOTE — PROGRESS NOTES
PT DAILY TREATMENT NOTE     Patient Name: Molly Souza  Date:10/25/2022  : 1980  [x]  Patient  Verified  Payor:  / Plan: Luis Birmingham 74 / Product Type:  /    In time:  9 Out time:1406  Total Treatment Time (min): 52  Visit #: 2 of 2-4      Treatment Area: Right knee pain [M25.561]    SUBJECTIVE  Pain Level (0-10 scale): 0/10  Any medication changes, allergies to medications, adverse drug reactions, diagnosis change, or new procedure performed?: [x] No    [] Yes (see summary sheet for update)  Subjective functional status/changes:   [] No changes reported  Pt reports seeing ortho today, was d/c due to reports of no pain. She presents in new shoes with improved ankle stability today.     OBJECTIVE    Modality rationale: decrease pain and increase tissue extensibility to improve the patient's ability to perform ADLs and rest comfortably following therapy   Min Type Additional Details    [] Estim:  []Unatt       []IFC  []Premod                        []Other:  []w/ice   []w/heat  Position:  Location:    [] Estim: []Att    []TENS instruct  []NMES                    []Other:  []w/US   []w/ice   []w/heat  Position:  Location:    []  Traction: [] Cervical       []Lumbar                       [] Prone          []Supine                       []Intermittent   []Continuous Lbs:  [] before manual  [] after manual    []  Ultrasound: []Continuous   [] Pulsed                           []1MHz   []3MHz Location:  W/cm2:    []  Iontophoresis with dexamethasone         Location: [] Take home patch   [] In clinic    []  Ice     []  heat  []  Ice massage  []  Laser   []  Anodyne Position:  Location:    []  Laser with stim  []  Other: Position:  Location:   PD [x]  Vasopneumatic Device  Pre:   Post:   Measured at mid joint line Pressure:       [] lo [x] med [] hi   Temperature: [x] lo [] med [] hi   [x] Skin assessment post-treatment:  [x]intact []redness- no adverse reaction    []redness - adverse reaction:      25 min Therapeutic Exercise:  [x] See flow sheet :  Use of TM today   Rationale: increase ROM and increase strength to improve the patients ability to perform gait and transfers with improved LE strength and mobility. 12 min Therapeutic Activity:  [x]  See flow sheet :  Lateral walks, GTB 61' x2  Retrograde GTB 60' x2     Rationale: increase strength, improve coordination, improve balance, and increase proprioception  to improve the patients ability to perform transfers, stair negotiation, and floor <> waist lifts. 10 min Neuromuscular Re-education:  [x]  See flow sheet :   -SLS on floor, EC  -SLS on foam, EO  -Steamboats on foam  -SLS on 1/2 FR   Rationale: increase strength, improve coordination, improve balance and increase proprioception  to improve the patients ability to perform stair negotiation and functional mobility in the home/community with improved quadriceps control and decreased falls risk. Through out min Patient Education: [x] Review HEP            Other Objective/Functional Measures:         Pain Level (0-10 scale) post treatment: 0/10     ASSESSMENT/Changes in Function:  Patient demonstrating good balance with more supportive shoes today, most challenged with SLS on 1/2 FR with med-lat instability. Main c/o pain with rotation movements at knee with standing iso clams and retro monster walks. Pt denies need for vaso or CP today, feels she did not tax the knee as much. Patient will continue to benefit from skilled PT services to modify and progress therapeutic interventions, address functional mobility deficits, address ROM deficits, address strength deficits, analyze and address soft tissue restrictions, analyze and cue movement patterns, analyze and modify body mechanics/ergonomics, assess and modify postural abnormalities and address imbalance/dizziness to attain remaining goals.      []  See Plan of Care  []  See progress note/recertification  []  See Discharge Summary         Progress towards goals / Updated goals:  - Goal: Patient will ascend and descend 4 steps x4 with reciprocal pattern and ease with ecc lowering to increase ease of entry into home  Status at last note/certification: reciprocal, no UE A, hesitation with ecc lowering of R knee onto L LE  - Goal: Patient will increase FOTO score to at least 64 pts to demonstrate increased functional mobility. Status at last note/certification:  63  -Goal: Pt will demo SLS on floor with EC of at least 15 seconds B for improved knee and ankle stability and decr falls risk.    SLS EC on floor: R: 9 sec, L: 6 sec incr med-lat sway and hip strategy      PLAN  [x]  Upgrade activities as tolerated     [x]  Continue plan of care  []  Update interventions per flow sheet       []  Discharge due to:  [x]  Other:_  Plan to cont with 2 remaining visits    Nneka Turk PT 10/25/2022  9:09 AM    Future Appointments   Date Time Provider Lise Jaime   10/25/2022  1:15 PM Dony Hudson PT [facility]

## 2022-11-29 ENCOUNTER — APPOINTMENT (OUTPATIENT)
Dept: PHYSICAL THERAPY | Age: 42
End: 2022-11-29

## 2022-12-06 ENCOUNTER — HOSPITAL ENCOUNTER (OUTPATIENT)
Dept: PHYSICAL THERAPY | Age: 42
Discharge: HOME | End: 2022-12-06
Payer: OTHER GOVERNMENT

## 2022-12-06 PROCEDURE — 97112 NEUROMUSCULAR REEDUCATION: CPT

## 2022-12-06 PROCEDURE — 97110 THERAPEUTIC EXERCISES: CPT

## 2022-12-06 PROCEDURE — 97530 THERAPEUTIC ACTIVITIES: CPT

## 2022-12-06 NOTE — PROGRESS NOTES
PT DAILY TREATMENT NOTE     Patient Name: Lucy Viveros  Date:2022  : 1980  [x]  Patient  Verified  Payor:  / Plan: Luis [place] 74 / Product Type:  /    In time:  918    Out time:1006  Total Treatment Time (min): 48  Visit #: 3  of 2-4      Treatment Area: Right knee pain [M25.561]    SUBJECTIVE  Pain Level (0-10 scale): 0/10  Any medication changes, allergies to medications, adverse drug reactions, diagnosis change, or new procedure performed?: [x] No    [] Yes (see summary sheet for update)  Subjective functional status/changes:   [] No changes reported  Pt reports going on trip and having ear infection for previous 3 weeks. \"I did very good on my trip considering [place] doesn't make level stairs, everything was steep and uneven\"    OBJECTIVE    25 min Therapeutic Exercise:  [x] See flow sheet :  Reassessment   Rationale: increase ROM and increase strength to improve the patients ability to perform gait and transfers with improved LE strength and mobility. 13 min Therapeutic Activity:  [x]  See flow sheet :  Goblet squat with 20# KB, cues for hip hinge and ab brace   Rationale: increase strength, improve coordination, improve balance, and increase proprioception  to improve the patients ability to perform transfers, stair negotiation, and floor <> waist lifts. 10 min Neuromuscular Re-education:  [x]  See flow sheet :   -SLS on floor, EC  -SLS on foam, EO  -Steamboats on foam  -SLS on 1/2 FR   Rationale: increase strength, improve coordination, improve balance and increase proprioception  to improve the patients ability to perform stair negotiation and functional mobility in the home/community with improved quadriceps control and decreased falls risk. Through out min Patient Education: [x] Review HEP            Other Objective/Functional Measures:   Subjective \"100%\" Improvement  Pain: Best: 0/10 Worst: 0/10 \"no pain\" Av/10  Improvements:  \"No longer feeling like I have a knee injury\", decr L ankle pain  Deficits: kneeling on R knee, L ankle instability (but is diminished)      Other Objective/Functional Measures:   Observation: left knee significant genu recurvatum in standing  Stairs: Reciprocal, without hesitation with descent  Strength: Hip Ext: L: 4/5, R: 4+5, Hip Abd: B: 5/5  Balance: SLS EC on floor: R: 30 sec, L: 9 sec      Pain Level (0-10 scale) post treatment: 0/10     ASSESSMENT/Changes in Function:  Please see D/c summary    Patient will continue to benefit from skilled PT services to modify and progress therapeutic interventions, address functional mobility deficits, address ROM deficits, address strength deficits, analyze and address soft tissue restrictions, analyze and cue movement patterns, analyze and modify body mechanics/ergonomics, assess and modify postural abnormalities and address imbalance/dizziness to attain remaining goals. []  See Plan of Care  []  See progress note/recertification  [x]  See Discharge Summary         Progress towards goals / Updated goals:  - Goal: Patient will ascend and descend 4 steps x4 with reciprocal pattern and ease with ecc lowering to increase ease of entry into home  Status at last note/certification: reciprocal, no UE A, hesitation with ecc lowering of R knee onto L LE  Current 12/6/22 goal met, reciprocal pattern without hesitation or UE A  - Goal: Patient will increase FOTO score to at least 64 pts to demonstrate increased functional mobility. Status at last note/certification:  63  Current 12/6/22 goal met 99  -Goal: Pt will demo SLS on floor with EC of at least 15 seconds B for improved knee and ankle stability and decr falls risk.    SLS EC on floor: R: 9 sec, L: 6 sec incr med-lat sway and hip strategy  Current 12/6/22 goal near met R: 30 sec, L: 9 sec      PLAN  [x]  Upgrade activities as tolerated     [x]  Continue plan of care  []  Update interventions per flow sheet       [x] Discharge due to: pt expresses readiness for d/c, has met or near met goals   []  Other:_      Dony Rondon, PT 12/6/2022  9:09 AM    Future Appointments   Date Time Provider Lise Jaime   12/6/2022  9:15 AM Jasiel Shah, PT [facility]   12/8/2022 10:00 AM Jasiel Shah PT [facility]

## 2022-12-06 NOTE — PROGRESS NOTES
[address] [facility] AT [address] 57  Phone: [phone] Fax: [phone] SUMMARY  Patient Name: Emelia Dunn : 1980   Treatment/Medical Diagnosis: Right knee pain [M25.561]   Referral Source: GILBERTO Morris     Date of Initial Visit: 22 Attended Visits: 9 Missed Visits: 1     SUMMARY OF TREATMENT   Pt is a pleasant 39 y.o. female who presents with c/o right knee pain. The patient reports an acute onset of right knee pain on 22 when she fell onto her right knee at a movie theater; further imaging of knee demonstrated right knee hemoarthrosis, full thickness PCL tear, significant degenerative changes throughout right knee. Treatment has consisted of the following: Therapeutic exercise, Therapeutic activities, Neuromuscular re-education, Physical agent/modality, Gait/balance training, Manual therapy, Patient education, Functional mobility training, and Self Care training. CURRENT STATUS  Pt has attended 9 treatments since initial evaluation on 22, including 6 week hiatus due to travel and infection. She returns today, demonstrating continued good progress, meeting all therapeutic goals and indicating readiness for d/c with final HEP for independent management of care. Further assessment as follows:         Subjective \"100%\" Improvement  Pain: Best: 0/10 Worst: 0/10 \"no pain\" Av/10  Improvements:  \"No longer feeling like I have a knee injury\", decr L ankle pain  Deficits: kneeling on R knee, L ankle instability (but is diminished)      Other Objective/Functional Measures:   Observation: left knee significant genu recurvatum in standing  Stairs: Reciprocal, without hesitation with descent  Strength: Hip Ext: L: 4/5, R: 4+5, Hip Abd: B: 5/5  Balance: SLS EC on floor: R: 30 sec, L: 9 sec         Progress towards goals / Updated goals:  - Goal: Patient will ascend and descend 4 steps x4 with reciprocal pattern and ease with ecc lowering to increase ease of entry into home  Status at last note/certification: reciprocal, no UE A, hesitation with ecc lowering of R knee onto L LE  Current 12/6/22 goal met, reciprocal pattern without hesitation or UE A  - Goal: Patient will increase FOTO score to at least 64 pts to demonstrate increased functional mobility. Status at last note/certification:  63  Current 12/6/22 goal met 99  -Goal: Pt will demo SLS on floor with EC of at least 15 seconds B for improved knee and ankle stability and decr falls risk. SLS EC on floor: R: 9 sec, L: 6 sec incr med-lat sway and hip strategy  Current 12/6/22 goal near met R: 30 sec, L: 9 sec    RECOMMENDATIONS  Discontinue therapy, patient has achieved satisfactory progress toward goals and improved QoL. If you have any questions/comments please contact us directly at [phone]   Thank you for allowing us to assist in the care of your patient. Therapist Signature: Toya Levy PT Date: 12/6/2022   Reporting Period  9/20/22 - 12/6/22 Time: 9:07 AM   NOTE TO PHYSICIAN:  PLEASE COMPLETE THE ORDERS BELOW AND FAX TO   [facility] at [facility]: [phone]. If you are unable to process this request in 24 hours please contact our office: [phone].  ___ I have read the above report and request that my patient continue as recommended.   ___ I have read the above report and request that my patient continue therapy with the following changes/special instructions:_________________________________________________________   ___ I have read the above report and request that my patient be discharged from therapy. Physician Signature:        Date:       Time:                                                        GILBERTO Clements.

## 2022-12-06 NOTE — PROGRESS NOTES
Physical Therapy Discharge Instructions      [facility] - [address] [facility]  [phone] fax      Patient: Garrick Ely  : 1980      Continue Home Exercise Program to maintain gains made in PT and overall well-being. Balance 1-2x/daily  Strengthening 3-4x/week                                                   Follow up with MD as needed. To return to PT a new MD referral is needed. Additional Comments: It was a pleasure working with you!  I wish you all the best!           Shaq Mom, PT 2022 9:35 AM  [email or web address]

## 2022-12-08 ENCOUNTER — APPOINTMENT (OUTPATIENT)
Dept: PHYSICAL THERAPY | Age: 42
End: 2022-12-08
Payer: OTHER GOVERNMENT

## 2023-08-08 NOTE — PROGRESS NOTES
[address] [facility] [address] [facility] AT [address] 57  Phone: [phone] Fax: [phone]  PROGRESS NOTE  Patient Name: Cole Anne : 1980   Treatment/Medical Diagnosis: Lower back pain [M54.5]   Referral Source: Vidhya Schneider MD     Date of Initial Visit: 19 Attended Visits: 7 Missed Visits: 0     SUMMARY OF TREATMENT    Pt is a 45 y. o. year old female who presents with co LS pain with L LE radiculopathy and tightness/muscle spasms and numbness of posterior chain starting with insidious onset late April. Hx LS discectomy in 2017. Treatment has included IMT, therex and traction. CURRENT STATUS  Patient has made slow progress with PT sec to acute herniation. Fair compliance with HEP and fairly sedentary lifestyle also limits progress. Other assessment as follows:  Pain at best 2/10, at worst 6/10  Subjective % improvement 70%  Objective:    LS AROM flexion WNL - UE assistance to stand, WNL all other directions    Core/ bridge dec 50%   Slump +    Hip strength: flexion 3+/5, ABD 3, Extension 3  Improvements: decreased pain, increased general mobility   Deficits decreased nerve sensation L knee, L knee collapsing          Progress towards goals / Updated goals: · Short Term Goals: To be accomplished in  1  weeks:  1.  Pt will be independent and compliant with HEP - Goal progressing - patient reports compliance with extension stretching but not strength training at this point   · [address] be accomplished in 16  treatments:  1.  Patient will increase FOTO score to 43/100 for indications of increased functional mobility.  Goal met at 51/100  2.  Patient will demo negative seated slump with 5 deg DF AROM for improved neural gliding to decrease pain with LS flexion activities  Goal not met - + slump sec to posterior chain tightness  3.  Patient will demo 100% bridge with pain less than 3/10 for indication of improved core strength for decreased compression with sitting at work  Goal not met - bridge = 50%  4. Patient will demo increased L ext owen strength to  4-/5 for decreased LS strength with car transfers  Goal not met at 3/5     New Goals to be achieved in __8-12__  treatments:  Cont goals 2-4 sec to not met   RECOMMENDATIONS  Patient would benefit from continuation of skilled PT to address above deficits and progress to increased activity tolerance. Cont 1-2 x 8-12 sessions as needed. If you have any questions/comments please contact us directly at [phone]   Thank you for allowing us to assist in the care of your patient. Therapist Signature: Melva Nagy PT Date: 7/8/2019     Time: 901am    NOTE TO PHYSICIAN:  PLEASE COMPLETE THE ORDERS BELOW AND FAX TO   [facility] at [facility]: [phone]. If you are unable to process this request in 24 hours please contact our office: [phone].  ___ I have read the above report and request that my patient continue as recommended.   ___ I have read the above report and request that my patient continue therapy with the following changes/special instructions:_________________________________________________________   ___ I have read the above report and request that my patient be discharged from therapy.      Physician Signature:        Date:       Time: show

## (undated) DEVICE — FLEX ADVANTAGE 3000CC: Brand: FLEX ADVANTAGE

## (undated) DEVICE — AVANOS* SHORT BEVEL NEEDLE: Brand: AVANOS

## (undated) DEVICE — SUTURE MCRYL SZ 3-0 L27IN ABSRB UD L24MM PS-1 3/8 CIR PRIM Y936H

## (undated) DEVICE — TRAY MYEL SFTY +

## (undated) DEVICE — JACKSON TABLE POSITIONER KIT: Brand: MEDLINE INDUSTRIES, INC.

## (undated) DEVICE — INTENDED FOR TISSUE SEPARATION, AND OTHER PROCEDURES THAT REQUIRE A SHARP SURGICAL BLADE TO PUNCTURE OR CUT.: Brand: BARD-PARKER SAFETY BLADES SIZE 20, STERILE

## (undated) DEVICE — WRAP COMPR BK

## (undated) DEVICE — INTENDED FOR TISSUE SEPARATION, AND OTHER PROCEDURES THAT REQUIRE A SHARP SURGICAL BLADE TO PUNCTURE OR CUT.: Brand: BARD-PARKER SAFETY BLADES SIZE 15, STERILE

## (undated) DEVICE — OPTIFOAM GENTLE SA, POSTOP, 4X8: Brand: MEDLINE

## (undated) DEVICE — SUTURE VCRL SZ 2-0 L18IN ABSRB VLT CT-1 L36MM 1/2 CIR J739D

## (undated) DEVICE — STOCKING COMPR XL L16-18IN LNG 19MMHG ANK 10-11IN CALF

## (undated) DEVICE — MEDIA CONTRAST 10ML 200MG/ML 41%

## (undated) DEVICE — WAX SURG 2.5GM HEMSTAT BNE BEESWAX PARAFFIN ISO PALMITATE

## (undated) DEVICE — (D)NDL SPNE 22GX15CM -- DISC BY MFR W/NO SUB

## (undated) DEVICE — KNIFE SURG ANNULOTOMY BAYNT DISP MAXCESS

## (undated) DEVICE — (D)BNDG ADHESIVE FABRIC 3/4X3 -- DISC BY MFR USE ITEM 357960

## (undated) DEVICE — BLANKET WRM AD W50XL85.8IN PACU FULL BODY FORC AIR

## (undated) DEVICE — SYR 10ML LUER LOK 1/5ML GRAD --

## (undated) DEVICE — SOLUTION IV 1000ML 0.9% SOD CHL

## (undated) DEVICE — GARMENT,MEDLINE,DVT,INT,CALF,MED, GEN2: Brand: MEDLINE

## (undated) DEVICE — SPIROMETER INCENT 2500ML W ONE W VLV

## (undated) DEVICE — REM POLYHESIVE ADULT PATIENT RETURN ELECTRODE: Brand: VALLEYLAB

## (undated) DEVICE — NEEDLE HYPO 22GA L1.5IN BLK S STL HUB POLYPR SHLD REG BVL

## (undated) DEVICE — Device

## (undated) DEVICE — GEL BAG FOR WRAPS --

## (undated) DEVICE — AIRLIFE™ ADULT CUSHION NASAL CANNULA 14 FOOT (4.3) CRUSH-RESISTANT OXYGEN TUBING, AND U/CONNECT-IT ADAPTER: Brand: AIRLIFE™

## (undated) DEVICE — KIT CLN UP BON SECOURS MARYV

## (undated) DEVICE — 3.0MM PRECISION NEURO (MATCH HEAD)

## (undated) DEVICE — STERILE POLYISOPRENE POWDER-FREE SURGICAL GLOVES: Brand: PROTEXIS

## (undated) DEVICE — (D)PREP SKN CHLRAPRP APPL 26ML -- CONVERT TO ITEM 371833

## (undated) DEVICE — SUTURE VCRL SZ 1 L18IN ABSRB VLT CT-1 L36MM 1/2 CIR J741D